# Patient Record
Sex: MALE | Race: WHITE | NOT HISPANIC OR LATINO | Employment: UNEMPLOYED | URBAN - METROPOLITAN AREA
[De-identification: names, ages, dates, MRNs, and addresses within clinical notes are randomized per-mention and may not be internally consistent; named-entity substitution may affect disease eponyms.]

---

## 2017-07-23 ENCOUNTER — APPOINTMENT (EMERGENCY)
Dept: CT IMAGING | Facility: HOSPITAL | Age: 52
End: 2017-07-23

## 2017-07-23 ENCOUNTER — HOSPITAL ENCOUNTER (EMERGENCY)
Facility: HOSPITAL | Age: 52
Discharge: HOME | End: 2017-07-23

## 2017-07-23 VITALS
HEART RATE: 50 BPM | WEIGHT: 180 LBS | DIASTOLIC BLOOD PRESSURE: 76 MMHG | SYSTOLIC BLOOD PRESSURE: 121 MMHG | TEMPERATURE: 98 F | OXYGEN SATURATION: 95 % | RESPIRATION RATE: 14 BRPM

## 2017-07-23 DIAGNOSIS — S16.1XXA NECK MUSCLE STRAIN, INITIAL ENCOUNTER: ICD-10-CM
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
## 2017-07-23 PROCEDURE — 96372 THER/PROPH/DIAG INJ SC/IM: CPT

## 2017-07-23 PROCEDURE — 70450 CT HEAD/BRAIN W/O DYE: CPT

## 2017-07-23 PROCEDURE — 72125 CT NECK SPINE W/O DYE: CPT

## 2017-07-23 PROCEDURE — 99284 EMERGENCY DEPT VISIT MOD MDM: CPT

## 2017-07-23 RX ORDER — METHOCARBAMOL 500 MG/1
500 TABLET, FILM COATED ORAL 4 TIMES DAILY
Qty: 20 TABLET | Refills: 0 | Status: SHIPPED | OUTPATIENT
Start: 2017-07-23 | End: 2017-09-10

## 2017-07-23 RX ORDER — KETOROLAC TROMETHAMINE 30 MG/ML
15 INJECTION, SOLUTION INTRAMUSCULAR; INTRAVENOUS ONCE
Status: COMPLETED | OUTPATIENT
Start: 2017-07-23 | End: 2017-07-23

## 2017-07-23 RX ORDER — NAPROXEN 500 MG/1
500 TABLET ORAL 2 TIMES DAILY WITH MEALS
Qty: 10 TABLET | Refills: 0 | Status: SHIPPED | OUTPATIENT
Start: 2017-07-23 | End: 2017-09-10

## 2017-07-23 RX ADMIN — KETOROLAC TROMETHAMINE 15 MG: 30 INJECTION, SOLUTION INTRAMUSCULAR at 13:14

## 2017-09-10 ENCOUNTER — HOSPITAL ENCOUNTER (EMERGENCY)
Facility: HOSPITAL | Age: 52
Discharge: HOME | End: 2017-09-10
Payer: MEDICARE

## 2017-09-10 ENCOUNTER — APPOINTMENT (EMERGENCY)
Dept: CT IMAGING | Facility: HOSPITAL | Age: 52
End: 2017-09-10
Payer: MEDICARE

## 2017-09-10 VITALS
TEMPERATURE: 99.7 F | OXYGEN SATURATION: 97 % | SYSTOLIC BLOOD PRESSURE: 119 MMHG | HEART RATE: 94 BPM | WEIGHT: 178.13 LBS | RESPIRATION RATE: 16 BRPM | DIASTOLIC BLOOD PRESSURE: 73 MMHG

## 2017-09-10 DIAGNOSIS — IMO0002: Primary | ICD-10-CM

## 2017-09-10 LAB
ALBUMIN SERPL BCP-MCNC: 3.5 G/DL (ref 3.5–5)
ALP SERPL-CCNC: 57 U/L (ref 46–116)
ALT SERPL W P-5'-P-CCNC: 25 U/L (ref 12–78)
ANION GAP SERPL CALCULATED.3IONS-SCNC: 6 MMOL/L (ref 4–13)
AST SERPL W P-5'-P-CCNC: 20 U/L (ref 5–45)
BASOPHILS # BLD AUTO: 0.02 THOUSANDS/ΜL (ref 0–0.1)
BASOPHILS NFR BLD AUTO: 0 % (ref 0–1)
BILIRUB SERPL-MCNC: 0.48 MG/DL (ref 0.2–1)
BUN SERPL-MCNC: 13 MG/DL (ref 5–25)
CALCIUM SERPL-MCNC: 9.3 MG/DL (ref 8.3–10.1)
CHLORIDE SERPL-SCNC: 101 MMOL/L (ref 100–108)
CO2 SERPL-SCNC: 32 MMOL/L (ref 21–32)
CREAT SERPL-MCNC: 0.87 MG/DL (ref 0.6–1.3)
EOSINOPHIL # BLD AUTO: 0.36 THOUSAND/ΜL (ref 0–0.61)
EOSINOPHIL NFR BLD AUTO: 2 % (ref 0–6)
ERYTHROCYTE [DISTWIDTH] IN BLOOD BY AUTOMATED COUNT: 13.1 % (ref 11.6–15.1)
GFR SERPL CREATININE-BSD FRML MDRD: 101 ML/MIN/1.73SQ M
GLUCOSE SERPL-MCNC: 123 MG/DL (ref 65–140)
HCT VFR BLD AUTO: 41.8 % (ref 36.5–49.3)
HGB BLD-MCNC: 15.4 G/DL (ref 12–17)
LACTATE SERPL-SCNC: 2 MMOL/L (ref 0.5–2)
LYMPHOCYTES # BLD AUTO: 1.44 THOUSANDS/ΜL (ref 0.6–4.47)
LYMPHOCYTES NFR BLD AUTO: 9 % (ref 14–44)
MCH RBC QN AUTO: 31.7 PG (ref 26.8–34.3)
MCHC RBC AUTO-ENTMCNC: 36.8 G/DL (ref 31.4–37.4)
MCV RBC AUTO: 86 FL (ref 82–98)
MONOCYTES # BLD AUTO: 1.33 THOUSAND/ΜL (ref 0.17–1.22)
MONOCYTES NFR BLD AUTO: 8 % (ref 4–12)
NEUTROPHILS # BLD AUTO: 13.46 THOUSANDS/ΜL (ref 1.85–7.62)
NEUTS SEG NFR BLD AUTO: 81 % (ref 43–75)
NRBC BLD AUTO-RTO: 0 /100 WBCS
PLATELET # BLD AUTO: 205 THOUSANDS/UL (ref 149–390)
PMV BLD AUTO: 10.6 FL (ref 8.9–12.7)
POTASSIUM SERPL-SCNC: 3.8 MMOL/L (ref 3.5–5.3)
PROT SERPL-MCNC: 7.5 G/DL (ref 6.4–8.2)
RBC # BLD AUTO: 4.86 MILLION/UL (ref 3.88–5.62)
SODIUM SERPL-SCNC: 139 MMOL/L (ref 136–145)
WBC # BLD AUTO: 16.61 THOUSAND/UL (ref 4.31–10.16)

## 2017-09-10 PROCEDURE — 80053 COMPREHEN METABOLIC PANEL: CPT | Performed by: PHYSICIAN ASSISTANT

## 2017-09-10 PROCEDURE — 99284 EMERGENCY DEPT VISIT MOD MDM: CPT

## 2017-09-10 PROCEDURE — 73201 CT UPPER EXTREMITY W/DYE: CPT

## 2017-09-10 PROCEDURE — 36415 COLL VENOUS BLD VENIPUNCTURE: CPT | Performed by: PHYSICIAN ASSISTANT

## 2017-09-10 PROCEDURE — 83605 ASSAY OF LACTIC ACID: CPT | Performed by: PHYSICIAN ASSISTANT

## 2017-09-10 PROCEDURE — 96361 HYDRATE IV INFUSION ADD-ON: CPT

## 2017-09-10 PROCEDURE — 87040 BLOOD CULTURE FOR BACTERIA: CPT | Performed by: PHYSICIAN ASSISTANT

## 2017-09-10 PROCEDURE — 96365 THER/PROPH/DIAG IV INF INIT: CPT

## 2017-09-10 PROCEDURE — 96367 TX/PROPH/DG ADDL SEQ IV INF: CPT

## 2017-09-10 PROCEDURE — 85025 COMPLETE CBC W/AUTO DIFF WBC: CPT | Performed by: PHYSICIAN ASSISTANT

## 2017-09-10 RX ORDER — CLINDAMYCIN HYDROCHLORIDE 150 MG/1
300 CAPSULE ORAL 4 TIMES DAILY
Qty: 80 CAPSULE | Refills: 0 | Status: SHIPPED | OUTPATIENT
Start: 2017-09-10 | End: 2017-09-20

## 2017-09-10 RX ORDER — NAPROXEN 500 MG/1
500 TABLET ORAL 2 TIMES DAILY WITH MEALS
Qty: 30 TABLET | Refills: 0 | Status: SHIPPED | OUTPATIENT
Start: 2017-09-10 | End: 2019-08-27 | Stop reason: ALTCHOICE

## 2017-09-10 RX ORDER — CLINDAMYCIN PHOSPHATE 600 MG/50ML
600 INJECTION INTRAVENOUS ONCE
Status: COMPLETED | OUTPATIENT
Start: 2017-09-10 | End: 2017-09-10

## 2017-09-10 RX ADMIN — CEFTRIAXONE 1000 MG: 1 INJECTION, POWDER, FOR SOLUTION INTRAMUSCULAR; INTRAVENOUS at 08:52

## 2017-09-10 RX ADMIN — SODIUM CHLORIDE 1000 ML: 0.9 INJECTION, SOLUTION INTRAVENOUS at 08:08

## 2017-09-10 RX ADMIN — IOHEXOL 100 ML: 350 INJECTION, SOLUTION INTRAVENOUS at 08:53

## 2017-09-10 RX ADMIN — CLINDAMYCIN PHOSPHATE 600 MG: 12 INJECTION, SOLUTION INTRAMUSCULAR; INTRAVENOUS at 08:08

## 2017-09-15 LAB
BACTERIA BLD CULT: NORMAL
BACTERIA BLD CULT: NORMAL

## 2018-09-16 ENCOUNTER — HOSPITAL ENCOUNTER (EMERGENCY)
Facility: HOSPITAL | Age: 53
Discharge: HOME | End: 2018-09-16
Attending: EMERGENCY MEDICINE | Admitting: EMERGENCY MEDICINE
Payer: COMMERCIAL

## 2018-09-16 VITALS
DIASTOLIC BLOOD PRESSURE: 83 MMHG | HEART RATE: 85 BPM | WEIGHT: 165 LBS | OXYGEN SATURATION: 95 % | TEMPERATURE: 98 F | RESPIRATION RATE: 17 BRPM | SYSTOLIC BLOOD PRESSURE: 131 MMHG

## 2018-09-16 DIAGNOSIS — L03.115 CELLULITIS AND ABSCESS OF RIGHT LEG: ICD-10-CM

## 2018-09-16 DIAGNOSIS — L02.415 CELLULITIS AND ABSCESS OF RIGHT LEG: ICD-10-CM
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
## 2018-09-16 PROCEDURE — 99281 EMR DPT VST MAYX REQ PHY/QHP: CPT

## 2018-09-16 RX ORDER — DOXYCYCLINE HYCLATE 100 MG/1
100 CAPSULE ORAL 2 TIMES DAILY
Qty: 20 CAPSULE | Refills: 0 | Status: SHIPPED | OUTPATIENT
Start: 2018-09-16 | End: 2018-09-23

## 2018-09-16 NOTE — DISCHARGE INSTRUCTIONS
Abscess   WHAT YOU NEED TO KNOW:   A warm compress may help your abscess drain  Your healthcare provider may make a cut in the abscess so it can drain  You may need surgery to remove an abscess that is on your hands or buttocks  DISCHARGE INSTRUCTIONS:   Return to the emergency department if:   · The area around your abscess becomes very painful, warm, or has red streaks  · You have a fever and chills  · Your heart is beating faster than usual      · You feel faint or confused  Contact your healthcare provider if:   · Your abscess gets bigger or does not get better  · Your abscess returns  · You have questions or concerns about your condition or care  Medicines: You may  need any of the following:  · Antibiotics  help treat a bacterial infection  · Acetaminophen  decreases pain and fever  It is available without a doctor's order  Ask how much to take and how often to take it  Follow directions  Acetaminophen can cause liver damage if not taken correctly  · NSAIDs , such as ibuprofen, help decrease swelling, pain, and fever  This medicine is available with or without a doctor's order  NSAIDs can cause stomach bleeding or kidney problems in certain people  If you take blood thinner medicine, always ask your healthcare provider if NSAIDs are safe for you  Always read the medicine label and follow directions  · Take your medicine as directed  Contact your healthcare provider if you think your medicine is not helping or if you have side effects  Tell him or her if you are allergic to any medicine  Keep a list of the medicines, vitamins, and herbs you take  Include the amounts, and when and why you take them  Bring the list or the pill bottles to follow-up visits  Carry your medicine list with you in case of an emergency  Self-care:   · Apply a warm compress to your abscess  This will help it open and drain  Wet a washcloth in warm, but not hot, water  Apply the compress for 10 minutes  Repeat this 4 times each day  Do not  press on an abscess or try to open it with a needle  You may push the bacteria deeper or into your blood  · Do not share your clothes, towels, or sheets with anyone  This can spread the infection to others  · Wash your hands often  This can help prevent the spread of germs  Use soap and water or an alcohol-based hand rub  Care for your wound after it is drained:   · Care for your wound as directed  If your healthcare provider says it is okay, carefully remove the bandage and gauze packing  You may need to soak the gauze to get it out of your wound  Clean your wound and the area around it as directed  Dry the area and put on new, clean bandages  Change your bandages when they get wet or dirty  · Ask your healthcare provider how to change the gauze in your wound  Keep track of how many pieces of gauze are placed inside the wound  Do not put too much packing in the wound  Do not pack the gauze too tightly in your wound  Follow up with your healthcare provider in 1 to 3 days: You may need to have your packing removed or your bandage changed  Write down your questions so you remember to ask them during your visits  © 2017 [address]  Information is for End User's use only and may not be sold, redistributed or otherwise used for commercial purposes  All illustrations and images included in CareNotes® are the copyrighted property of [employer]  or Jean-Claude Morrow  The above information is an  only  It is not intended as medical advice for individual conditions or treatments  Talk to your doctor, nurse or pharmacist before following any medical regimen to see if it is safe and effective for you  Cellulitis, Ambulatory Care   GENERAL INFORMATION:   Cellulitis  is a skin infection caused by bacteria         Common symptoms include the following:   · Fever    · A red, warm, swollen area on your skin    · Pain when the area is touched    · Bumps or blisters (abscess) that may drain pus    · Bumpy, raised skin that feels like an orange peel  Seek immediate care for the following symptoms:   · An increase in pain, redness, warmth, and size    · Red streaks coming from the infected area    · A thin, gray-brown discharge coming from your infected skin area    · A crackling under your skin when you touch it    · Purple dots or bumps on your skin, or bleeding under your skin    · New swelling and pain in your legs    · Sudden trouble breathing or chest pain  Treatment for cellulitis  may include medicines to treat the bacterial infection or decrease pain  The infection may need to be cleaned out  Damaged, dead, or infected tissue may need to be cut away to help your wound heal   Manage your symptoms:   · Elevate your wound above the level of your heart  as often as you can  This will help decrease swelling and pain  Prop your wound on pillows or blankets to keep it elevated comfortably  · Clean your wound as directed  You may need to wash the wound with soap and water  Look for signs of infection  · Wear pressure stockings as directed  The stockings are tight and put pressure on your legs  This improves blood flow and decreases swelling  Prevent cellulitis:   · Wash your hands often  Use soap and water  Wash your hands after you use the bathroom, change a child's diapers, or sneeze  Wash your hands before you prepare or eat food  Use lotion to prevent dry, cracked skin  · Do not share personal items, such as towels, clothing, and razors  · Clean exercise equipment  with germ-killing  before and after you use it  Follow up with your healthcare provider as directed:  Write down your questions so you remember to ask them during your visits  CARE AGREEMENT:   You have the right to help plan your care  Learn about your health condition and how it may be treated   Discuss treatment options with your caregivers to decide what care you want to receive  You always have the right to refuse treatment  The above information is an  only  It is not intended as medical advice for individual conditions or treatments  Talk to your doctor, nurse or pharmacist before following any medical regimen to see if it is safe and effective for you  © 2014 [address] is for End User's use only and may not be sold, redistributed or otherwise used for commercial purposes  All illustrations and images included in CareNotes® are the copyrighted property of A [employer] M [employer]  or Jean-Claude Morrow

## 2018-09-16 NOTE — ED PROVIDER NOTES
History  Chief Complaint   Patient presents with    Insect Bite     patient reports multiple "mosquito bites on lower legs that I scratched open"  Abscess   Location:  Leg  Leg abscess location:  R upper leg, L upper leg, R lower leg and L lower leg  Size:  Multiple open draining abscesses all approximately a the size of a nickel in diameter or less  Abscess quality: draining, painful, redness and warmth    Red streaking: no    Progression:  Unchanged  Pain details:     Quality:  Dull and aching    Severity:  Mild    Timing:  Constant    Progression:  Worsening  Chronicity:  New  Context: insect bite/sting    Relieved by:  Nothing  Worsened by:  Nothing  Ineffective treatments:  None tried  Associated symptoms: no fever    Risk factors: prior abscess        Prior to Admission Medications   Prescriptions Last Dose Informant Patient Reported? Taking?   naproxen (EC NAPROSYN) 500 MG EC tablet   No No   Sig: Take 1 tablet by mouth 2 (two) times a day with meals for 15 days      Facility-Administered Medications: None       Past Medical History:   Diagnosis Date    Asthma     Bronchitis     Hepatitis C     Pneumonia        Past Surgical History:   Procedure Laterality Date    MOUTH SURGERY         History reviewed  No pertinent family history  I have reviewed and agree with the history as documented  Social History   Substance Use Topics    Smoking status: Current Every Day Smoker     Packs/day: 1 00     Types: Cigarettes    Smokeless tobacco: Never Used    Alcohol use No        Review of Systems   Constitutional: Negative for fever  Eyes: Negative for pain  Respiratory: Negative for chest tightness  Cardiovascular: Negative for leg swelling  Gastrointestinal: Negative for constipation  Endocrine: Negative for polydipsia  Genitourinary: Negative for flank pain  Musculoskeletal: Negative for back pain  Skin: Negative for rash  Allergic/Immunologic: Negative for food allergies  Neurological: Negative for facial asymmetry  Hematological: Does not bruise/bleed easily  Psychiatric/Behavioral: Negative for confusion  Physical Exam  Physical Exam   Constitutional: He appears well-developed and well-nourished  HENT:   Head: Normocephalic  Eyes: Conjunctivae are normal    Neck: Neck supple  No JVD present  Cardiovascular: Normal rate  Pulmonary/Chest: Effort normal    Abdominal: He exhibits no distension  Musculoskeletal: He exhibits no edema or deformity  Neurological: He is alert  Skin: Skin is warm  Multiple areas of excoriations throughout the lower extremities  All are open and weeping to are noted in the right upper thigh which are red and expressing pus but are open and weeping  Do not feel any of the need further management with I and D  Psychiatric: He has a normal mood and affect  Vital Signs  ED Triage Vitals [09/16/18 0348]   Temperature Pulse Respirations Blood Pressure SpO2   98 °F (36 7 °C) 85 17 131/83 95 %      Temp Source Heart Rate Source Patient Position - Orthostatic VS BP Location FiO2 (%)   Oral -- Sitting Right arm --      Pain Score       --           Vitals:    09/16/18 0348   BP: 131/83   Pulse: 85   Patient Position - Orthostatic VS: Sitting       Visual Acuity      ED Medications  Medications - No data to display    Diagnostic Studies  Results Reviewed     None                 No orders to display              Procedures  Procedures       Phone Contacts  ED Phone Contact    ED Course                               MDM  Number of Diagnoses or Management Options  Bug bites:   Cellulitis and abscess of right leg:   Diagnosis management comments: Right and left legs  Patient shows me open draining abscesses on the right upper thigh and knee  No evidence of septic joint  Patient admits to narcotic use within the last 12 hours  This time will cover with doxycycline  Educated patient on need to take antibiotics and finish the course  Educated patient persistent or worsening signs symptoms and to return to the emergency department since patient has no PCP  Patient is nontoxic on exam   Patient admits understanding and agreement  Patient was discharged in stable ambulatory condition  CritCare Time    Disposition  Final diagnoses:   Bug bites   Cellulitis and abscess of right leg - Draining abscess     Time reflects when diagnosis was documented in both MDM as applicable and the Disposition within this note     Time User Action Codes Description Comment    9/16/2018  4:13 AM Landry Coughlin Donovan Flavors  XXXA] Bug bites     9/16/2018  4:14 AM Landry Coughlin [[id number],  [id number]] Cellulitis and abscess of right leg     9/16/2018  4:14 AM LydiaTrinidadevelinmakayla Mendel [[id number],  [id number]] Cellulitis and abscess of right leg Draining abscess      ED Disposition     ED Disposition Condition Comment    Discharge  Jaymes Runner discharge to home/self care  Condition at discharge: Stable        Follow-up Information    None         Discharge Medication List as of 9/16/2018  4:16 AM      START taking these medications    Details   doxycycline hyclate (VIBRAMYCIN) 100 mg capsule Take 1 capsule (100 mg total) by mouth 2 (two) times a day for 7 days, Starting Sun 9/16/2018, Until Sun 9/23/2018, Print         CONTINUE these medications which have NOT CHANGED    Details   naproxen (EC NAPROSYN) 500 MG EC tablet Take 1 tablet by mouth 2 (two) times a day with meals for 15 days, Starting Sun 9/10/2017, Until Mon 9/25/2017, Print           No discharge procedures on file      ED Provider  Electronically Signed by           Jane Draper PA-C  09/17/18 8556

## 2018-10-03 ENCOUNTER — HOSPITAL ENCOUNTER (EMERGENCY)
Facility: HOSPITAL | Age: 53
Discharge: HOME | End: 2018-10-03
Attending: EMERGENCY MEDICINE
Payer: COMMERCIAL

## 2018-10-03 VITALS
SYSTOLIC BLOOD PRESSURE: 116 MMHG | DIASTOLIC BLOOD PRESSURE: 70 MMHG | HEART RATE: 59 BPM | RESPIRATION RATE: 18 BRPM | OXYGEN SATURATION: 98 % | TEMPERATURE: 96.9 F

## 2018-10-03 DIAGNOSIS — F32.A DEPRESSION: Primary | ICD-10-CM

## 2018-10-03 LAB
AMPHETAMINES SERPL QL SCN: NEGATIVE
ANION GAP SERPL CALCULATED.3IONS-SCNC: 7 MMOL/L (ref 5–14)
BACTERIA UR QL AUTO: ABNORMAL /HPF
BARBITURATES UR QL: NEGATIVE
BASOPHILS # BLD AUTO: 0.1 THOUSANDS/ΜL (ref 0–0.1)
BASOPHILS NFR BLD AUTO: 1 % (ref 0–1)
BENZODIAZ UR QL: NEGATIVE
BILIRUB UR QL STRIP: NEGATIVE
BUN SERPL-MCNC: 22 MG/DL (ref 5–25)
CALCIUM SERPL-MCNC: 9.4 MG/DL (ref 8.4–10.2)
CAOX CRY URNS QL MICRO: ABNORMAL /HPF
CHLORIDE SERPL-SCNC: 101 MMOL/L (ref 97–108)
CLARITY UR: ABNORMAL
CO2 SERPL-SCNC: 32 MMOL/L (ref 22–30)
COCAINE UR QL: NEGATIVE
COLOR UR: YELLOW
CREAT SERPL-MCNC: 0.66 MG/DL (ref 0.7–1.5)
EOSINOPHIL # BLD AUTO: 0.3 THOUSAND/ΜL (ref 0–0.4)
EOSINOPHIL NFR BLD AUTO: 4 % (ref 0–6)
ERYTHROCYTE [DISTWIDTH] IN BLOOD BY AUTOMATED COUNT: 13.6 %
ETHANOL SERPL-MCNC: <10 MG/DL (ref 0–10)
GFR SERPL CREATININE-BSD FRML MDRD: 111 ML/MIN/1.73SQ M
GLUCOSE SERPL-MCNC: 95 MG/DL (ref 70–99)
GLUCOSE UR STRIP-MCNC: NEGATIVE MG/DL
HCT VFR BLD AUTO: 45.8 % (ref 41–53)
HGB BLD-MCNC: 15.5 G/DL (ref 13.5–17.5)
HGB UR QL STRIP.AUTO: NEGATIVE
KETONES UR STRIP-MCNC: NEGATIVE MG/DL
LEUKOCYTE ESTERASE UR QL STRIP: 25
LYMPHOCYTES # BLD AUTO: 2.2 THOUSANDS/ΜL (ref 0.5–4)
LYMPHOCYTES NFR BLD AUTO: 27 % (ref 20–50)
MCH RBC QN AUTO: 30.4 PG (ref 26–34)
MCHC RBC AUTO-ENTMCNC: 33.7 G/DL (ref 31–36)
MCV RBC AUTO: 90 FL (ref 80–100)
METHADONE UR QL: NEGATIVE
MONOCYTES # BLD AUTO: 0.8 THOUSAND/ΜL (ref 0.2–0.9)
MONOCYTES NFR BLD AUTO: 9 % (ref 1–10)
NEUTROPHILS # BLD AUTO: 5 THOUSANDS/ΜL (ref 1.8–7.8)
NEUTS SEG NFR BLD AUTO: 60 % (ref 45–65)
NITRITE UR QL STRIP: NEGATIVE
NON-SQ EPI CELLS URNS QL MICRO: ABNORMAL /HPF
OPIATES UR QL SCN: POSITIVE
PCP UR QL: NEGATIVE
PH UR STRIP.AUTO: 6 [PH] (ref 4.5–8)
PLATELET # BLD AUTO: 196 THOUSANDS/UL (ref 150–450)
PMV BLD AUTO: 9.3 FL (ref 8.9–12.7)
POTASSIUM SERPL-SCNC: 4.4 MMOL/L (ref 3.6–5)
PROT UR STRIP-MCNC: NEGATIVE MG/DL
RBC # BLD AUTO: 5.08 MILLION/UL (ref 4.5–5.9)
RBC #/AREA URNS AUTO: ABNORMAL /HPF
SODIUM SERPL-SCNC: 140 MMOL/L (ref 137–147)
SP GR UR STRIP.AUTO: 1.02 (ref 1–1.04)
THC UR QL: POSITIVE
UROBILINOGEN UA: NEGATIVE MG/DL
WBC # BLD AUTO: 8.4 THOUSAND/UL (ref 4.5–11)
WBC #/AREA URNS AUTO: ABNORMAL /HPF

## 2018-10-03 PROCEDURE — 85025 COMPLETE CBC W/AUTO DIFF WBC: CPT | Performed by: EMERGENCY MEDICINE

## 2018-10-03 PROCEDURE — 80048 BASIC METABOLIC PNL TOTAL CA: CPT | Performed by: EMERGENCY MEDICINE

## 2018-10-03 PROCEDURE — 80307 DRUG TEST PRSMV CHEM ANLYZR: CPT | Performed by: EMERGENCY MEDICINE

## 2018-10-03 PROCEDURE — 99284 EMERGENCY DEPT VISIT MOD MDM: CPT

## 2018-10-03 PROCEDURE — 36415 COLL VENOUS BLD VENIPUNCTURE: CPT | Performed by: EMERGENCY MEDICINE

## 2018-10-03 PROCEDURE — 81001 URINALYSIS AUTO W/SCOPE: CPT | Performed by: EMERGENCY MEDICINE

## 2018-10-03 PROCEDURE — 80320 DRUG SCREEN QUANTALCOHOLS: CPT | Performed by: EMERGENCY MEDICINE

## 2018-10-03 PROCEDURE — 81003 URINALYSIS AUTO W/O SCOPE: CPT | Performed by: EMERGENCY MEDICINE

## 2018-10-03 RX ORDER — LORAZEPAM 0.5 MG/1
TABLET ORAL
Status: COMPLETED
Start: 2018-10-03 | End: 2018-10-03

## 2018-10-03 RX ORDER — CLONIDINE HYDROCHLORIDE 0.1 MG/1
TABLET ORAL
Status: COMPLETED
Start: 2018-10-03 | End: 2018-10-03

## 2018-10-03 RX ORDER — LORAZEPAM 0.5 MG/1
1 TABLET ORAL ONCE
Status: COMPLETED | OUTPATIENT
Start: 2018-10-03 | End: 2018-10-03

## 2018-10-03 RX ORDER — CLONIDINE HYDROCHLORIDE 0.1 MG/1
0.1 TABLET ORAL ONCE
Status: COMPLETED | OUTPATIENT
Start: 2018-10-03 | End: 2018-10-03

## 2018-10-03 RX ORDER — ONDANSETRON 2 MG/ML
4 INJECTION INTRAMUSCULAR; INTRAVENOUS ONCE
Status: DISCONTINUED | OUTPATIENT
Start: 2018-10-03 | End: 2018-10-03

## 2018-10-03 RX ADMIN — LORAZEPAM 1 MG: 0.5 TABLET ORAL at 09:22

## 2018-10-03 RX ADMIN — CLONIDINE HYDROCHLORIDE 0.1 MG: 0.1 TABLET ORAL at 09:22

## 2018-10-03 NOTE — ED NOTES
The patient is willing to be evaluated by HOST, [phone]  Alex Melton stated she will be out shortly to assess the patient

## 2018-10-03 NOTE — ED NOTES
Pt given blankets, pillow, some food and drinks   Made comfortable     [facility]  10/03/18 8647

## 2018-10-03 NOTE — ED NOTES
Ro from [facility]OST located beds at [facility] and [facility]  Referrals faxed  Awaiting decision

## 2018-10-03 NOTE — ED NOTES
The patient was accepted at [facility]  He was picked up by their

## 2018-10-03 NOTE — DISCHARGE INSTRUCTIONS
Depression, Ambulatory Care   GENERAL INFORMATION:   Depression  is a medical condition that causes feelings of sadness or hopelessness that do not go away  Depression may cause you to lose interest in things you used to enjoy  These feelings may interfere with your daily life  Common symptoms include the following:   · Appetite changes, or weight gain or loss    · Trouble going to sleep or staying asleep, or sleeping too much    · Fatigue or lack of energy    · Feeling restless, irritable, or withdrawn    · Feeling worthless, hopeless, discouraged, or very guilty    · Trouble concentrating, remembering things, doing daily tasks, or making decisions    · Thoughts about hurting or killing yourself  Seek immediate care for the following symptoms:   · You think about harming yourself or someone else  Treatment for depression  may include medicine to improve or balance your mood  Therapy may also be used to treat your depression  A therapist will help you learn to cope with your thoughts and feelings  Therapy can be done alone or in a group  It may also be done with family members or a significant other  Manage depression:   · Get regular physical activity  Try to exercise for 30 minutes, 3 to 5 days a week  Work with your healthcare provider to develop an exercise plan that you enjoy  · Get enough sleep  Create a routine to help you relax before bed  Try to go to bed and wake up at the same time every day  Sleep is important for emotional health  · Eat a variety of healthy foods  Healthy foods include fruits, vegetables, whole-grain breads, low-fat dairy products, beans, lean meats, and fish  A healthy meal plan is low in fat, salt, and added sugar  · Avoid or limit alcohol  Ask your healthcare provider how much alcohol is safe for you to drink  A drink of alcohol is 12 ounces of beer, 5 ounces of wine, or 1½ ounces of liquor  Follow up with your healthcare provider as directed:   You will need to return so your healthcare provider can monitor your progress  Write down your questions so you remember to ask them during your visits  CARE AGREEMENT:   You have the right to help plan your care  Learn about your health condition and how it may be treated  Discuss treatment options with your caregivers to decide what care you want to receive  You always have the right to refuse treatment  The above information is an  only  It is not intended as medical advice for individual conditions or treatments  Talk to your doctor, nurse or pharmacist before following any medical regimen to see if it is safe and effective for you  © 2014 [address] is for End User's use only and may not be sold, redistributed or otherwise used for commercial purposes  All illustrations and images included in CareNotes® are the copyrighted property of [employer] M [employer]  or Jean-Claude Morrow

## 2018-10-03 NOTE — ED PROVIDER NOTES
History  Chief Complaint   Patient presents with    Depression     pt states that he has been feeling depressed for a month now  pt deneis see a psych doctor  pt states that he was speaking with someone at the [facility] clinic  pt that the last time he spoke with someone was in Aug  pt states that he last took meds over 20 years ago for his depression  pt denies SI but states that he want to go to sleep and not wake up  pt states that he is tired of living like this  pt states that he is tired of struggling   Psychiatric Evaluation     pt states that he last used tuesday afternoon heroin 3 bags IV  pt denies any drinking  pt states that he is homeless and has been homeless since [address]  This is a 59-year-old male past medical history of heroin abuse on Suboxone up until the beginning of the month presents for evaluation of substance abuse  Patient states that he is currently homeless and split up with his girlfriend approximately 1 month ago  Since then he has been under lot of stress and ran out of his Suboxone prescription  This was prescribed by [address] but he missed his last appointment and was unable to get a further prescription  He has been injecting 4-10 bags of heroin daily since then  Denies any other drug use  Patient states that tonight he felt hopeless and came here to get help with his drug problem  No active suicidal homicidal ideation  He comes in seeking treatment  No other current complaints            Prior to Admission Medications   Prescriptions Last Dose Informant Patient Reported? Taking?   naproxen (EC NAPROSYN) 500 MG EC tablet   No No   Sig: Take 1 tablet by mouth 2 (two) times a day with meals for 15 days      Facility-Administered Medications: None       Past Medical History:   Diagnosis Date    Asthma     Bronchitis     Hepatitis C     Pneumonia        Past Surgical History:   Procedure Laterality Date    MOUTH SURGERY         History reviewed   No pertinent family history  I have reviewed and agree with the history as documented  Social History   Substance Use Topics    Smoking status: Current Every Day Smoker     Packs/day: 1 00     Types: Cigarettes    Smokeless tobacco: Never Used    Alcohol use No        Review of Systems   Constitutional: Negative for appetite change, chills and fever  HENT: Negative for rhinorrhea and sore throat  Eyes: Negative for photophobia and visual disturbance  Respiratory: Negative for cough and shortness of breath  Cardiovascular: Negative for chest pain and palpitations  Gastrointestinal: Negative for abdominal pain and diarrhea  Genitourinary: Negative for dysuria, frequency and urgency  Skin: Negative for rash  Neurological: Negative for dizziness and weakness  Psychiatric/Behavioral: Positive for dysphoric mood and suicidal ideas  All other systems reviewed and are negative  Physical Exam  Physical Exam   Constitutional: He is oriented to person, place, and time  He appears well-developed and well-nourished  HENT:   Head: Normocephalic and atraumatic  Right Ear: External ear normal    Left Ear: External ear normal    Mouth/Throat: Oropharynx is clear and moist    Eyes: Pupils are equal, round, and reactive to light  Conjunctivae and EOM are normal    Neck: Normal range of motion  Neck supple  No JVD present  No tracheal deviation present  Cardiovascular: Normal rate, regular rhythm and normal heart sounds  Exam reveals no gallop and no friction rub  No murmur heard  Pulmonary/Chest: Effort normal  No stridor  No respiratory distress  He has no wheezes  He has no rales  Abdominal: Soft  He exhibits no distension and no mass  There is no tenderness  There is no rebound and no guarding  Musculoskeletal: Normal range of motion  He exhibits no edema  Neurological: He is alert and oriented to person, place, and time  No cranial nerve deficit  Skin: Skin is warm and dry   No rash noted  No erythema  No pallor  Psychiatric: He has a normal mood and affect  Nursing note and vitals reviewed  Vital Signs  ED Triage Vitals [10/03/18 0205]   Temperature Pulse Respirations Blood Pressure SpO2   (!) 96 9 °F (36 1 °C) 59 18 116/70 98 %      Temp Source Heart Rate Source Patient Position - Orthostatic VS BP Location FiO2 (%)   Tympanic Monitor -- Left arm --      Pain Score       --           Vitals:    10/03/18 0205   BP: 116/70   Pulse: 59       Visual Acuity      ED Medications  Medications   cloNIDine (CATAPRES) tablet 0 1 mg (0 1 mg Oral Given 10/3/18 7863)   LORazepam (ATIVAN) tablet 1 mg (1 mg Oral Given 10/3/18 0908)       Diagnostic Studies  Results Reviewed     Procedure Component Value Units Date/Time    Urine Microscopic [[id number]]  (Abnormal) Collected:  10/03/18 0254    Lab Status:  Final result Specimen:  Urine from Urine, Clean Catch Updated:  10/03/18 0341     RBC, UA None Seen /hpf      WBC, UA 2-4 (A) /hpf      Epithelial Cells Occasional /hpf      Bacteria, UA Occasional /hpf      Ca Oxalate Gina, UA Occasional (A) /hpf     Rapid drug screen, urine [[id number]]  (Abnormal) Collected:  10/03/18 0253    Lab Status:  Final result Specimen:  Urine from Urine, Clean Catch Updated:  10/03/18 0334     Amph/Meth UR Negative     Barbiturate Ur Negative     Benzodiazepine Urine Negative     Cocaine Urine Negative     Methadone Urine Negative     Opiate Urine Positive (A)     PCP Ur Negative     THC Urine Positive (A)    Narrative:         Presumptive report  If requested, specimen will be sent to reference lab for confirmation  FOR MEDICAL PURPOSES ONLY  IF CONFIRMATION NEEDED PLEASE CONTACT THE LAB WITHIN 5 DAYS      Drug Screen Cutoff Levels:  AMPHETAMINE/METHAMPHETAMINES  1000 ng/mL  BARBITURATES     200 ng/mL  BENZODIAZEPINES     200 ng/mL  COCAINE      300 ng/mL  METHADONE      300 ng/mL  OPIATES      300 ng/mL  PHENCYCLIDINE     25 ng/mL  THC       50 ng/mL    Ethanol [[id number]]  (Normal) Collected:  10/03/18 0253    Lab Status:  Final result Specimen:  Blood from Arm, Left Updated:  10/03/18 0328     Ethanol Lvl <10 mg/dL     Basic metabolic panel [[id number]]  (Abnormal) Collected:  10/03/18 0253    Lab Status:  Final result Specimen:  Blood from Arm, Left Updated:  10/03/18 0972     Sodium 140 mmol/L      Potassium 4 4 mmol/L      Chloride 101 mmol/L      CO2 32 (H) mmol/L      ANION GAP 7 mmol/L      BUN 22 mg/dL      Creatinine 0 66 (L) mg/dL      Glucose 95 mg/dL      Calcium 9 4 mg/dL      eGFR 111 ml/min/1 73sq m     Narrative:         National Kidney Disease Education Program recommendations are as follows:  GFR calculation is accurate only with a steady state creatinine  Chronic Kidney disease less than 60 ml/min/1 73 sq  meters  Kidney failure less than 15 ml/min/1 73 sq  meters      UA w Reflex to Microscopic w Reflex to Culture [[id number]]  (Abnormal) Collected:  10/03/18 0254    Lab Status:  Final result Specimen:  Urine from Urine, Clean Catch Updated:  10/03/18 0319     Color, UA Yellow     Clarity, UA Cloudy (A)     Specific Gravity, UA 1 025     pH, UA 6 0     Leukocytes, UA 25 0 (A)     Nitrite, UA Negative     Protein, UA Negative mg/dl      Glucose, UA Negative mg/dl      Ketones, UA Negative mg/dl      Bilirubin, UA Negative     Blood, UA Negative     UROBILINOGEN UA Negative mg/dL     CBC and differential [[id number]]  (Normal) Collected:  10/03/18 0253    Lab Status:  Final result Specimen:  Blood from Arm, Left Updated:  10/03/18 0318     WBC 8 40 Thousand/uL      RBC 5 08 Million/uL      Hemoglobin 15 5 g/dL      Hematocrit 45 8 %      MCV 90 fL      MCH 30 4 pg      MCHC 33 7 g/dL      RDW 13 6 %      MPV 9 3 fL      Platelets 975 Thousands/uL      Neutrophils Relative 60 %      Lymphocytes Relative 27 %      Monocytes Relative 9 %      Eosinophils Relative 4 %      Basophils Relative 1 %      Neutrophils Absolute 5 00 Thousands/µL      Lymphocytes Absolute 2 20 Thousands/µL      Monocytes Absolute 0 80 Thousand/µL      Eosinophils Absolute 0 30 Thousand/µL      Basophils Absolute 0 10 Thousands/µL                  No orders to display              Procedures  Procedures       Phone Contacts  ED Phone Contact    ED Course  ED Course as of Oct 03 2217   Wed Oct 03, 2018   [id number] Care signed out to a m  physician, Dr Nya aRmirez                                [facility]  Number of Diagnoses or Management Options  Diagnosis management comments: 52-year-old male presents for help with his heroin abuse  No SI or HI  Will obtain medical clearance lab work and have crisis evaluate patient in the morning    CritCare Time    Disposition  Final diagnoses:   Depression     Time reflects when diagnosis was documented in both MDM as applicable and the Disposition within this note     Time User Action Codes Description Comment    10/3/2018 12:11 PM Altaf Coughlin [F32 9] Depression       ED Disposition     ED Disposition Condition Comment    Discharge  Derik Zavala discharge to home/self care  Condition at discharge: Good        Follow-up Information    None         Discharge Medication List as of 10/3/2018 12:12 PM      CONTINUE these medications which have NOT CHANGED    Details   naproxen (EC NAPROSYN) 500 MG EC tablet Take 1 tablet by mouth 2 (two) times a day with meals for 15 days, Starting Sun 9/10/2017, Until Mon 9/25/2017, Print           No discharge procedures on file      ED Provider  Electronically Signed by           Cortez Hill MD  10/03/18 2210

## 2019-01-04 ENCOUNTER — HOSPITAL ENCOUNTER (EMERGENCY)
Facility: HOSPITAL | Age: 54
Discharge: HOME | End: 2019-01-04
Attending: EMERGENCY MEDICINE | Admitting: EMERGENCY MEDICINE
Payer: COMMERCIAL

## 2019-01-04 VITALS
BODY MASS INDEX: 23.16 KG/M2 | TEMPERATURE: 97.5 F | SYSTOLIC BLOOD PRESSURE: 132 MMHG | HEIGHT: 72 IN | DIASTOLIC BLOOD PRESSURE: 63 MMHG | WEIGHT: 171 LBS | RESPIRATION RATE: 16 BRPM | OXYGEN SATURATION: 97 % | HEART RATE: 73 BPM

## 2019-01-04 DIAGNOSIS — K04.7 DENTAL ABSCESS: Primary | ICD-10-CM

## 2019-01-04 PROCEDURE — 99282 EMERGENCY DEPT VISIT SF MDM: CPT

## 2019-01-04 RX ORDER — IBUPROFEN 400 MG/1
400 TABLET ORAL EVERY 6 HOURS PRN
Qty: 12 TABLET | Refills: 0 | Status: SHIPPED | OUTPATIENT
Start: 2019-01-04

## 2019-01-04 RX ORDER — AMOXICILLIN AND CLAVULANATE POTASSIUM 875; 125 MG/1; MG/1
1 TABLET, FILM COATED ORAL 2 TIMES DAILY
Qty: 14 TABLET | Refills: 0 | Status: SHIPPED | OUTPATIENT
Start: 2019-01-04 | End: 2019-01-14

## 2019-01-04 NOTE — ED PROVIDER NOTES
History  Chief Complaint   Patient presents with    Oral Pain     I have pain to my right lower gum line  I have had it for months  It has been and on problem  I have noted pus coming out of the area  Patient is a 50-year-old male with multiple comorbid conditions who presents today with a chief complaint of right-sided lower jaw pain  Patient reports his gum line has been swollen and painful for months however recently he was able to express pus out of the area  Patient denies any difficulty managing oral secretions, difficulty swallowing, radiation of the pain, numbness, tingling  Patient denies fevers or chills, facial swelling, abdominal pain nausea vomiting  Patient reports he is able to eat and drink normally and has not taken anything for the pain  Patient reports he is not allergic to any antibiotics and has a dentist appointment coming up in mid January  Dental Pain   Location:  Lower  Lower teeth location: right lower gumline  Quality:  Aching  Severity:  Mild  Onset quality:  Gradual  Timing:  Constant  Progression:  Unchanged  Chronicity:  New  Context: abscess    Relieved by: expressing pus  Worsened by:  Nothing  Ineffective treatments:  None tried  Associated symptoms: no congestion and no fever        Prior to Admission Medications   Prescriptions Last Dose Informant Patient Reported? Taking?   naproxen (EC NAPROSYN) 500 MG EC tablet   No No   Sig: Take 1 tablet by mouth 2 (two) times a day with meals for 15 days      Facility-Administered Medications: None       Past Medical History:   Diagnosis Date    Asthma     Bronchitis     Hepatitis C     Pneumonia        Past Surgical History:   Procedure Laterality Date    MOUTH SURGERY         History reviewed  No pertinent family history  I have reviewed and agree with the history as documented      Social History   Substance Use Topics    Smoking status: Current Every Day Smoker     Packs/day: 1 00     Types: Cigarettes    Smokeless tobacco: Never Used    Alcohol use No        Review of Systems   Constitutional: Negative for chills, fatigue and fever  HENT: Positive for dental problem  Negative for congestion, ear pain, rhinorrhea and sore throat  Eyes: Negative for redness  Respiratory: Negative for chest tightness and shortness of breath  Cardiovascular: Negative for chest pain and palpitations  Gastrointestinal: Negative for abdominal pain, nausea and vomiting  Genitourinary: Negative for dysuria and hematuria  Musculoskeletal: Negative  Skin: Negative for rash  Neurological: Negative for dizziness, syncope, light-headedness and numbness  Physical Exam  Physical Exam   Constitutional: He is oriented to person, place, and time  He appears well-developed and well-nourished  HENT:   Head: Normocephalic and atraumatic  Mouth/Throat: Dental caries present  Multiple dental carries noted and missing teeth   Eyes: Pupils are equal, round, and reactive to light  Neck: Normal range of motion  Cardiovascular: Normal rate, regular rhythm and normal heart sounds  Pulmonary/Chest: Effort normal and breath sounds normal    Abdominal: Soft  There is no tenderness  There is no guarding  Lymphadenopathy:     He has no cervical adenopathy  Neurological: He is alert and oriented to person, place, and time  Skin: Skin is warm and dry  Capillary refill takes less than 2 seconds  Psychiatric: He has a normal mood and affect  Nursing note and vitals reviewed        Vital Signs  ED Triage Vitals [01/04/19 1636]   Temperature Pulse Respirations Blood Pressure SpO2   97 5 °F (36 4 °C) 73 16 132/63 97 %      Temp src Heart Rate Source Patient Position - Orthostatic VS BP Location FiO2 (%)   -- Monitor Sitting Left arm --      Pain Score       7           Vitals:    01/04/19 1636   BP: 132/63   Pulse: 73   Patient Position - Orthostatic VS: Sitting       Visual Acuity      ED Medications  Medications - No data to display    Diagnostic Studies  Results Reviewed     None                 No orders to display              Procedures  Procedures       Phone Contacts  ED Phone Contact    ED Course                               MDM  CritCare Time    Disposition  Final diagnoses:   Dental abscess     Time reflects when diagnosis was documented in both MDM as applicable and the Disposition within this note     Time User Action Codes Description Comment    1/4/2019  4:47 PM Latoya Nava Madyson [K04 7] Dental abscess       ED Disposition     ED Disposition Condition Comment    Discharge  [place] discharge to home/self care  Condition at discharge: Good        Follow-up Information     Follow up With Specialties Details Why [address]  Schedule an appointment as soon as possible for a visit  [address] [place] [address]          Patient's Medications   Discharge Prescriptions    AMOXICILLIN-CLAVULANATE (AUGMENTIN) 875-125 MG PER TABLET    Take 1 tablet by mouth 2 (two) times a day for 10 days       Start Date: 1/4/2019  End Date: 1/14/2019       Order Dose: 1 tablet       Quantity: 14 tablet    Refills: 0    IBUPROFEN (MOTRIN) 400 MG TABLET    Take 1 tablet (400 mg total) by mouth every 6 (six) hours as needed for mild pain       Start Date: 1/4/2019  End Date: --       Order Dose: 400 mg       Quantity: 12 tablet    Refills: 0     No discharge procedures on file      ED Provider  Electronically Signed by           Topher Madrid PA-C  01/04/19 1297

## 2019-01-04 NOTE — DISCHARGE INSTRUCTIONS
Dental Abscess   WHAT YOU NEED TO KNOW:   A dental abscess is a collection of pus in or around a tooth  A dental abscess is caused by bacteria  The bacteria usually enter the tooth when the enamel (outer part of the tooth) is damaged by tooth decay  Bacteria may also enter the tooth through a break or chip in the tooth, or a cut in the gum  Food particles that are stuck between the teeth for a long time may also lead to an abscess  DISCHARGE INSTRUCTIONS:   Return to the emergency department if:   · You have severe pain  · You have trouble breathing because of pain or swelling  Contact your healthcare provider if:   · Your symptoms get worse, even after treatment  · Your mouth is bleeding  · You cannot eat or drink because of pain or swelling  · Your abscess returns  · You have an injury that causes a crack in your tooth  · You have questions or concerns about your condition or care  Medicines: You may  need any of the following:  · Antibiotics  help treat a bacterial infection  · NSAIDs , such as ibuprofen, help decrease swelling, pain, and fever  This medicine is available with or without a doctor's order  NSAIDs can cause stomach bleeding or kidney problems in certain people  If you take blood thinner medicine, always ask your healthcare provider if NSAIDs are safe for you  Always read the medicine label and follow directions  · Acetaminophen  decreases pain and fever  It is available without a doctor's order  Ask how much to take and how often to take it  Follow directions  Read the labels of all other medicines you are using to see if they also contain acetaminophen, or ask your doctor or pharmacist  Acetaminophen can cause liver damage if not taken correctly  Do not use more than 4 grams (4,000 milligrams) total of acetaminophen in one day  · Prescription pain medicine  may be given  Ask your healthcare provider how to take this medicine safely   Some prescription pain medicines contain acetaminophen  Do not take other medicines that contain acetaminophen without talking to your healthcare provider  Too much acetaminophen may cause liver damage  Prescription pain medicine may cause constipation  Ask your healthcare provider how to prevent or treat constipation  · Take your medicine as directed  Contact your healthcare provider if you think your medicine is not helping or if you have side effects  Tell him of her if you are allergic to any medicine  Keep a list of the medicines, vitamins, and herbs you take  Include the amounts, and when and why you take them  Bring the list or the pill bottles to follow-up visits  Carry your medicine list with you in case of an emergency  Self-care:   · Rinse your mouth every 2 hours with salt water  This will help keep the area clean  · Gently brush your teeth twice a day with a soft tooth brush  This will help keep the area clean  · Eat soft foods as directed  Soft foods may cause less pain  Examples include applesauce, yogurt, and cooked pasta  Ask your healthcare provider how long to follow this instruction  · Apply a warm compress to your tooth or gum  Use a cotton ball or gauze soaked in warm water  Remove the compress in 10 minutes or when it becomes cool  Repeat 3 times a day  Prevent another abscess:   · Brush your teeth at least 2 times a day with fluoride toothpaste  · Use dental floss to clean between your teeth at least once a day  · Rinse your mouth with water or mouthwash after meals and snacks  · Chew sugarless gum after meals and snacks  · Limit foods that are sticky and high in sugar such as raisons  Also limit drinks high in sugar, such as soda  · See your dentist every 6 months for dental cleanings and oral exams  Follow up with your healthcare provider in 24 hours: Your healthcare provider will need to check your teeth and gums   Write down your questions so you remember to ask them during your visits  © 2017 [employer] Information is for End User's use only and may not be sold, redistributed or otherwise used for commercial purposes  All illustrations and images included in CareNotes® are the copyrighted property of [employer]  or [employer]Cla[employer] Morrow  The above information is an  only  It is not intended as medical advice for individual conditions or treatments  Talk to your doctor, nurse or pharmacist before following any medical regimen to see if it is safe and effective for you

## 2019-08-27 ENCOUNTER — HOSPITAL ENCOUNTER (EMERGENCY)
Facility: HOSPITAL | Age: 54
Discharge: HOME | End: 2019-08-27
Attending: EMERGENCY MEDICINE
Payer: COMMERCIAL

## 2019-08-27 VITALS
HEIGHT: 61 IN | WEIGHT: 175 LBS | BODY MASS INDEX: 33.04 KG/M2 | TEMPERATURE: 99.4 F | HEART RATE: 98 BPM | RESPIRATION RATE: 18 BRPM | DIASTOLIC BLOOD PRESSURE: 81 MMHG | SYSTOLIC BLOOD PRESSURE: 143 MMHG | OXYGEN SATURATION: 99 %

## 2019-08-27 DIAGNOSIS — T40.1X1A HEROIN OVERDOSE (HCC): Primary | ICD-10-CM

## 2019-08-27 PROCEDURE — 99284 EMERGENCY DEPT VISIT MOD MDM: CPT

## 2019-08-27 PROCEDURE — 99284 EMERGENCY DEPT VISIT MOD MDM: CPT | Performed by: EMERGENCY MEDICINE

## 2019-08-28 NOTE — ED PROVIDER NOTES
History  Chief Complaint   Patient presents with    Overdose - Accidental     Pt presents to ER after bystander called 911 as pt was nodding off outside of a local market place after shooting up 2 bags of heroin today  Patient was not given narcan and is arouseable to verbal stimuli as per EMS  Arrives in no apparent distress and reports only complaint is he is stressed and tired  Patient states he was clean for 2 years and today was the first time he used since  53yoM brought in by ems, was found down earlier today after injecting himself with heroin  No narcan was given  Patient states he was upset because one of his friends passed away yesterday and he decided to use heroin today  States he injected in right antecubital, and says he has not used heroin for years prior to this use  Denies any falls, trauma, injury, pain, fever, sob, numbness in extremities  Patient denies suicidal or homicidal ideations  Prior to Admission Medications   Prescriptions Last Dose Informant Patient Reported? Taking?   ibuprofen (MOTRIN) 400 mg tablet   No No   Sig: Take 1 tablet (400 mg total) by mouth every 6 (six) hours as needed for mild pain      Facility-Administered Medications: None       Past Medical History:   Diagnosis Date    Asthma     Bronchitis     COPD (chronic obstructive pulmonary disease) (HCC)     Hepatitis C     Pneumonia        Past Surgical History:   Procedure Laterality Date    MOUTH SURGERY         History reviewed  No pertinent family history  I have reviewed and agree with the history as documented  Social History     Tobacco Use    Smoking status: Current Every Day Smoker     Packs/day: 1 00     Types: Cigarettes    Smokeless tobacco: Never Used   Substance Use Topics    Alcohol use: No    Drug use: Yes     Types: Heroin     Comment: IV heroin        Review of Systems   Constitutional: Negative for fatigue and fever  HENT: Negative for ear pain and hearing loss      Eyes: Negative for pain  Respiratory: Negative for chest tightness and shortness of breath  Cardiovascular: Negative for chest pain  Gastrointestinal: Negative for abdominal pain, nausea and vomiting  Genitourinary: Negative for difficulty urinating, dysuria and urgency  Musculoskeletal: Negative for back pain  Skin: Negative for rash and wound  Neurological: Negative for syncope and headaches  Psychiatric/Behavioral: Negative for agitation  All other systems reviewed and are negative  Physical Exam  ED Triage Vitals [08/27/19 1858]   Temperature Pulse Respirations Blood Pressure SpO2   99 4 °F (37 4 °C) (!) 110 20 143/81 93 %      Temp Source Heart Rate Source Patient Position - Orthostatic VS BP Location FiO2 (%)   Oral Monitor Sitting Right arm --      Pain Score       No Pain             Orthostatic Vital Signs  Vitals:    08/27/19 1858 08/27/19 2046   BP: 143/81    Pulse: (!) 110 98   Patient Position - Orthostatic VS: Sitting        Physical Exam   Constitutional: He is oriented to person, place, and time  He appears well-developed and well-nourished  HENT:   Head: Normocephalic and atraumatic  Eyes: Pupils are equal, round, and reactive to light  Conjunctivae and EOM are normal  Right eye exhibits no discharge  Left eye exhibits no discharge  Neck: Normal range of motion  No JVD present  No tracheal deviation present  Cardiovascular: Normal rate, regular rhythm and normal heart sounds  No murmur heard  Pulmonary/Chest: Effort normal and breath sounds normal  He has no wheezes  Abdominal: Soft  Bowel sounds are normal  He exhibits no distension  There is no tenderness  Musculoskeletal: Normal range of motion  Neurological: He is alert and oriented to person, place, and time  Skin: Skin is warm and dry  He is not diaphoretic  Psychiatric: He has a normal mood and affect   His speech is normal  Judgment and thought content normal  Cognition and memory are normal    Nursing note and vitals reviewed  ED Medications  Medications - No data to display    Diagnostic Studies  Results Reviewed     None                 No orders to display         Procedures  Procedures        ED Course                               MDM  Number of Diagnoses or Management Options  Heroin overdose [facility]):   Diagnosis management comments: Patient was seen due to accidental overdose of heroin which did not require narcan or resuscitative measures  Patient is mentating appropriately in the ED with no complaints, denies any suicidal or homicidal ideations  Patient received information for drug rehabilitation  Return precautions advised      Disposition  Final diagnoses:   Heroin overdose ([address] )     Time reflects when diagnosis was documented in both MDM as applicable and the Disposition within this note     Time User Action Codes Description Comment    8/27/2019  8:18 PM Paster, [address] [facility] [address] Heroin overdose [facility])       ED Disposition     ED Disposition Condition Date/Time Comment    Discharge Stable Tue Aug 27, 2019  8:18 PM Nancy Looney discharge to home/self care  Follow-up Information     Follow up With Specialties Details Why Contact Info Additional Information    [employer]fol[employer]  Call   [phone]       [facility] Emergency Department Emergency Medicine  If symptoms worsen [address]  [phone]  ED, [address], [place], [address] [facility], [address]          Discharge Medication List as of 8/27/2019  8:19 PM      CONTINUE these medications which have NOT CHANGED    Details   ibuprofen (MOTRIN) 400 mg tablet Take 1 tablet (400 mg total) by mouth every 6 (six) hours as needed for mild pain, Starting Fri 1/4/2019, Print           No discharge procedures on file  ED Provider  Attending physically available and evaluated Nancy Looney I managed the patient along with the ED Attending      Electronically Signed by         Melvin Yeh [address] Rosetta Wu, DO  08/27/19 2227

## 2019-09-12 NOTE — ED ATTENDING ATTESTATION
Kareen Dover MD, saw and evaluated the patient  I have discussed the patient with the resident/non-physician practitioner and agree with the resident's/non-physician practitioner's findings, Plan of Care, and MDM as documented in the resident's/non-physician practitioner's note, except where noted  All available labs and Radiology studies were reviewed  I was present for key portions of any procedure(s) performed by the resident/non-physician practitioner and I was immediately available to provide assistance  At this point I agree with the current assessment done in the Emergency Department  I have conducted an independent evaluation of this patient a history and physical is as follows:    Patient brought in after being found down earlier today after injecting himself with heroin  Patient was upset because a friend  yesterday and he decided use heroin  Patient denies any suicidal intention and states that he has not used heroin for years prior to this  Patient did not require any Narcan  No additional complaints  Exam: AAOx3, NAD, RRR, CTA, S/NT/ND, no motor/sensory deficits  A/P:  Heroin abuse    Given the patient did not require any reversal agents, will discharge    Critical Care Time  Procedures

## 2019-11-23 ENCOUNTER — HOSPITAL ENCOUNTER (EMERGENCY)
Facility: HOSPITAL | Age: 54
Discharge: HOME | End: 2019-11-23
Attending: EMERGENCY MEDICINE
Payer: MEDICARE

## 2019-11-23 VITALS
WEIGHT: 174.6 LBS | DIASTOLIC BLOOD PRESSURE: 101 MMHG | BODY MASS INDEX: 32.99 KG/M2 | RESPIRATION RATE: 18 BRPM | OXYGEN SATURATION: 97 % | TEMPERATURE: 97.9 F | SYSTOLIC BLOOD PRESSURE: 174 MMHG | HEART RATE: 112 BPM

## 2019-11-23 DIAGNOSIS — T40.1X1A HEROIN OVERDOSE (HCC): Primary | ICD-10-CM

## 2019-11-23 PROCEDURE — 99282 EMERGENCY DEPT VISIT SF MDM: CPT | Performed by: EMERGENCY MEDICINE

## 2019-11-23 PROCEDURE — 99284 EMERGENCY DEPT VISIT MOD MDM: CPT

## 2019-11-23 RX ORDER — NALOXONE HYDROCHLORIDE 1 MG/ML
INJECTION INTRAMUSCULAR; INTRAVENOUS; SUBCUTANEOUS
Status: COMPLETED
Start: 2019-11-23 | End: 2019-11-23

## 2019-11-23 NOTE — ED PROVIDER NOTES
History  Chief Complaint   Patient presents with    Heroin Overdose - Accidental     EMS stated that pt was found unresponsive and was blue in color  EMS stated that gf called  EMS gave pt 1mg narcan IV, 18g left AC  pt is alert and oriented at this time  pt states that he used 1-2 bags of heroin IV but denies meth      Patient is a 60-year-old male history of asthma, COPD hepatitis, presents the emergency room for heroin overdose  Patient admits to injecting heroin with a random girlfriend" that he had just met  States he has been clean for a while but decided to use again with a girl tonight  Per EMS, patient received 1 dose of Narcan in the field and became awake alert and oriented after initially being somnolent with pinpoint pupils  Patient now awake alert oriented  States he does not want to be evaluated for rehab or detox program   Otherwise without complaints  Specifically no chest pain, shortness of breath or difficulty breathing  Prior to Admission Medications   Prescriptions Last Dose Informant Patient Reported? Taking?   ibuprofen (MOTRIN) 400 mg tablet   No No   Sig: Take 1 tablet (400 mg total) by mouth every 6 (six) hours as needed for mild pain      Facility-Administered Medications: None       Past Medical History:   Diagnosis Date    Asthma     Bronchitis     COPD (chronic obstructive pulmonary disease) (HCC)     Hepatitis C     Pneumonia        Past Surgical History:   Procedure Laterality Date    MOUTH SURGERY         History reviewed  No pertinent family history  I have reviewed and agree with the history as documented  Social History     Tobacco Use    Smoking status: Current Every Day Smoker     Packs/day: 1 00     Types: Cigarettes    Smokeless tobacco: Never Used   Substance Use Topics    Alcohol use: No    Drug use: Yes     Types: Heroin, Methamphetamines     Comment: IV heroin        Review of Systems   Constitutional: Negative    Negative for chills and fever    HENT: Negative  Negative for rhinorrhea, sore throat, trouble swallowing and voice change  Eyes: Negative  Negative for pain and visual disturbance  Respiratory: Negative  Negative for cough, shortness of breath and wheezing  Cardiovascular: Negative  Negative for chest pain and palpitations  Gastrointestinal: Negative for abdominal pain, diarrhea, nausea and vomiting  Genitourinary: Negative  Negative for dysuria and frequency  Musculoskeletal: Negative  Negative for neck pain and neck stiffness  Skin: Negative  Negative for rash  Neurological: Negative  Negative for dizziness, speech difficulty, weakness, light-headedness and numbness  Physical Exam  Physical Exam   Constitutional: He is oriented to person, place, and time  He appears well-developed and well-nourished  No distress  HENT:   Head: Normocephalic and atraumatic  Mouth/Throat: Oropharynx is clear and moist    Eyes: Pupils are equal, round, and reactive to light  Conjunctivae and EOM are normal    Neck: Normal range of motion  Neck supple  No tracheal deviation present  Cardiovascular: Regular rhythm and intact distal pulses  Tachycardia present  Pulmonary/Chest: Effort normal and breath sounds normal  No respiratory distress  He has no wheezes  He has no rales  Abdominal: Soft  Bowel sounds are normal  He exhibits no distension  There is no tenderness  There is no rebound and no guarding  Musculoskeletal: Normal range of motion  He exhibits no tenderness or deformity  Neurological: He is alert and oriented to person, place, and time  Skin: Skin is warm and dry  Capillary refill takes less than 2 seconds  No rash noted  Psychiatric: He has a normal mood and affect  His behavior is normal    Nursing note and vitals reviewed        Vital Signs  ED Triage Vitals   Temperature Pulse Respirations Blood Pressure SpO2   11/23/19 0100 11/23/19 0100 11/23/19 0100 11/23/19 0059 11/23/19 0100   97 9 °F (36 6 °C) (!) 112 18 164/84 97 %      Temp Source Heart Rate Source Patient Position - Orthostatic VS BP Location FiO2 (%)   11/23/19 0100 11/23/19 0100 11/23/19 0059 11/23/19 0059 --   Tympanic Monitor Sitting Right arm       Pain Score       --                  Vitals:    11/23/19 0059 11/23/19 0100   BP: 164/84 (!) 174/101   Pulse:  (!) 112   Patient Position - Orthostatic VS: Sitting Sitting         Visual Acuity      ED Medications  Medications   naloxone (NARCAN) 2 MG/2ML injection **ADS Override Pull** (  Given to EMS 11/23/19 0059)       Diagnostic Studies  Results Reviewed     None                 No orders to display              Procedures  Procedures       ED Course                               MDM  Number of Diagnoses or Management Options  Heroin overdose [facility]):   Diagnosis management comments: Patient is a 71-year-old male who presented to the emergency room for concerns of heroin overdose  Other than some mild tachycardia, his physical exam is otherwise benign  He has declined to be evaluated for rehabilitation program   Patient will be monitored for short period in the emergency room and if has no complications and heart rate improved, can be discharged for outpatient follow-up with his family medicine provider  Disposition  Final diagnoses:   Heroin overdose ([address] )     Time reflects when diagnosis was documented in both MDM as applicable and the Disposition within this note     Time User Action Codes Description Comment    11/23/2019  1:30 AM Glenn Friedman Add [T40 1X1A] Heroin overdose [facility])       ED Disposition     ED Disposition Condition Date/Time Comment    Discharge Stable Sat Nov 23, 2019  1:31 AM Elvira Morel discharge to home/self care  Follow-up Information     Follow up With Specialties Details Why Contact Info    AVERY Butler MD Internal Medicine   [address]  [employer]    [address] [place] [address]  [phone]            Discharge Medication List as of 11/23/2019  1:31 AM      CONTINUE these medications which have NOT CHANGED    Details   ibuprofen (MOTRIN) 400 mg tablet Take 1 tablet (400 mg total) by mouth every 6 (six) hours as needed for mild pain, Starting Fri 1/4/2019, Print           No discharge procedures on file      ED Provider  Electronically Signed by           Mathieu Fraser DO  11/23/19 8703

## 2020-09-03 ENCOUNTER — HOSPITAL ENCOUNTER (EMERGENCY)
Facility: HOSPITAL | Age: 55
Discharge: HOME | End: 2020-09-03
Attending: EMERGENCY MEDICINE | Admitting: EMERGENCY MEDICINE
Payer: COMMERCIAL

## 2020-09-03 VITALS
HEART RATE: 94 BPM | DIASTOLIC BLOOD PRESSURE: 73 MMHG | OXYGEN SATURATION: 98 % | WEIGHT: 156.53 LBS | SYSTOLIC BLOOD PRESSURE: 117 MMHG | RESPIRATION RATE: 18 BRPM | TEMPERATURE: 96.5 F | BODY MASS INDEX: 29.58 KG/M2

## 2020-09-03 DIAGNOSIS — L25.5 TOXICODENDRON DERMATITIS: Primary | ICD-10-CM

## 2020-09-03 PROCEDURE — 99282 EMERGENCY DEPT VISIT SF MDM: CPT

## 2020-09-03 PROCEDURE — 99284 EMERGENCY DEPT VISIT MOD MDM: CPT | Performed by: EMERGENCY MEDICINE

## 2020-09-03 RX ORDER — PREDNISONE 50 MG/1
TABLET ORAL
Qty: 40 TABLET | Refills: 0 | Status: SHIPPED | OUTPATIENT
Start: 2020-09-03

## 2020-09-03 NOTE — ED PROVIDER NOTES
History  Chief Complaint   Patient presents with    Rash     Patient reports red rash on arms and legs for one week     Patient is a 52-year-old male  He presents to the emergency room complaining of an itchy rash  He thinks it might be poison oak or poison sumac  It is mostly to his arms and legs  Is been going on for about a week  No associated fever  Prior to Admission Medications   Prescriptions Last Dose Informant Patient Reported? Taking?   ibuprofen (MOTRIN) 400 mg tablet   No No   Sig: Take 1 tablet (400 mg total) by mouth every 6 (six) hours as needed for mild pain      Facility-Administered Medications: None       Past Medical History:   Diagnosis Date    Asthma     Bronchitis     COPD (chronic obstructive pulmonary disease) (HCC)     Hepatitis C     Pneumonia        Past Surgical History:   Procedure Laterality Date    MOUTH SURGERY         History reviewed  No pertinent family history  I have reviewed and agree with the history as documented  E-Cigarette/Vaping     E-Cigarette/Vaping Substances     Social History     Tobacco Use    Smoking status: Current Every Day Smoker     Packs/day: 1 00     Types: Cigarettes    Smokeless tobacco: Never Used   Substance Use Topics    Alcohol use: No    Drug use: Yes     Types: Marijuana     Comment: IV heroin       Review of Systems   Constitutional: Negative for chills and fever  Skin: Positive for rash  Negative for wound  Physical Exam  Physical Exam  Vitals signs reviewed  Constitutional:       General: He is not in acute distress  HENT:      Head: Normocephalic and atraumatic  Nose: Nose normal       Mouth/Throat:      Mouth: Mucous membranes are moist    Eyes:      General:         Right eye: No discharge  Left eye: No discharge  Conjunctiva/sclera: Conjunctivae normal    Neck:      Musculoskeletal: Normal range of motion and neck supple     Pulmonary:      Effort: Pulmonary effort is normal  No respiratory distress  Musculoskeletal:         General: No deformity or signs of injury  Skin:     General: Skin is warm and dry  Coloration: Skin is not pale  Findings: Rash present  Comments: Rashes present mostly to the arms and legs  It is red, patchy, crusty  There are several lesions that her linear  Neurological:      General: No focal deficit present  Mental Status: He is alert and oriented to person, place, and time  Psychiatric:         Mood and Affect: Mood normal          Behavior: Behavior normal          Vital Signs  ED Triage Vitals [09/03/20 1520]   Temperature Pulse Respirations Blood Pressure SpO2   (!) 97 2 °F (36 2 °C) 94 18 117/73 98 %      Temp Source Heart Rate Source Patient Position - Orthostatic VS BP Location FiO2 (%)   Tympanic Monitor Sitting Left arm --      Pain Score       --           Vitals:    09/03/20 1520   BP: 117/73   Pulse: 94   Patient Position - Orthostatic VS: Sitting         Visual Acuity      ED Medications  Medications - No data to display    Diagnostic Studies  Results Reviewed     None                 No orders to display              Procedures  Procedures         ED Course       US AUDIT      Most Recent Value   Initial Alcohol Screen: US AUDIT-C    1  How often do you have a drink containing alcohol?  0 Filed at: 09/03/2020 1525   2  How many drinks containing alcohol do you have on a typical day you are drinking? 0 Filed at: 09/03/2020 1525   3a  Male UNDER 65: How often do you have five or more drinks on one occasion? 0 Filed at: 09/03/2020 1525   3b  FEMALE Any Age, or MALE 65+: How often do you have 4 or more drinks on one occassion? 0 Filed at: 09/03/2020 1525   Audit-C Score  0 Filed at: 09/03/2020 1525                  BREANNA/DAST-10      Most Recent Value   How many times in the past year have you    Used an illegal drug or used a prescription medication for non-medical reasons?   Once or Twice Filed at: 09/03/2020 1525   In the past 12 months      1  Have you used drugs other than those required for medical reasons? 1 Filed at: 09/03/2020 1525   2  Do you use more than one drug at a time? 0 Filed at: 09/03/2020 1525   3  Have you had medical problems as a result of your drug use (e g , memory loss, hepatitis, convulsions, bleeding, etc )? 0 Filed at: 09/03/2020 1525   4  Have you had "blackouts" or "flashbacks" as a result of drug use? YesNo  0 Filed at: 09/03/2020 1525   5  Do you ever feel bad or guilty about your drug use? 0 Filed at: 09/03/2020 1525   6  Does your spouse (or parent) ever complain about your involvement with drugs? 0 Filed at: 09/03/2020 1525   7  Have you neglected your family because of your use of drugs? 0 Filed at: 09/03/2020 1525   8  Have you engaged in illegal activities in order to obtain drugs? 0 Filed at: 09/03/2020 1525   9  Have you ever experienced withdrawal symptoms (felt sick) when you stopped taking drugs? 0 Filed at: 09/03/2020 1525   10  Are you always able to stop using drugs when you want to?  0 Filed at: 09/03/2020 1525   DAST-10 Score  1 Filed at: 09/03/2020 1525                                MDM  Number of Diagnoses or Management Options  Toxicodendron dermatitis:   Diagnosis management comments: Patient has lesions that are pathoneumonic for toxidendron dermatitis  Other contact dermatitis would be less likely  This is not eczema  There is no cellulitis  Disposition  Final diagnoses:   Toxicodendron dermatitis     Time reflects when diagnosis was documented in both MDM as applicable and the Disposition within this note     Time User Action Codes Description Comment    9/3/2020  3:47 PM Jadon Snyder Add [L25 5] Toxicodendron dermatitis       ED Disposition     ED Disposition Condition Date/Time Comment    Discharge Stable Thu Sep 3, 2020  3:47 PM Fabiola Rowley discharge to home/self care              Follow-up Information     Follow up With Specialties Details Why Contact Carlita Perez MD Internal Medicine In 2 weeks  [address]  [facility]  [address] [phone]            Patient's Medications   Discharge Prescriptions    PREDNISONE 50 MG TABLET    60mg po qd x 3 days, then 40mg po qd x 3 days, then 20mg po qd x 3 days, then 10mg po qd x 3 days, then stop       Start Date: 9/3/2020  End Date: --       Order Dose: --       Quantity: 40 tablet    Refills: 0     No discharge procedures on file      PDMP Review     None          ED Provider  Electronically Signed by           Srinivas White MD  09/03/20 4890

## 2020-09-04 ENCOUNTER — HOSPITAL ENCOUNTER (EMERGENCY)
Facility: HOSPITAL | Age: 55
Discharge: HOME | End: 2020-09-04
Attending: EMERGENCY MEDICINE
Payer: COMMERCIAL

## 2020-09-04 VITALS
WEIGHT: 158.73 LBS | HEART RATE: 91 BPM | SYSTOLIC BLOOD PRESSURE: 144 MMHG | TEMPERATURE: 95.5 F | RESPIRATION RATE: 18 BRPM | OXYGEN SATURATION: 97 % | DIASTOLIC BLOOD PRESSURE: 80 MMHG | BODY MASS INDEX: 29.99 KG/M2

## 2020-09-04 DIAGNOSIS — T40.1X1A HEROIN OVERDOSE, ACCIDENTAL OR UNINTENTIONAL, INITIAL ENCOUNTER (HCC): Primary | ICD-10-CM

## 2020-09-04 PROCEDURE — 99284 EMERGENCY DEPT VISIT MOD MDM: CPT

## 2020-09-04 PROCEDURE — 99282 EMERGENCY DEPT VISIT SF MDM: CPT | Performed by: PHYSICIAN ASSISTANT

## 2020-09-04 NOTE — ED NOTES
Pt repeatedly asked for a knife and his teeth that he had on him prior to the overdose  After checking with security and the room multiple times we do not have anything of his in the hospital  The patient was given his jacket that he left in the room upon exit  Security was present in the waiting room as he was leaving the unit  The patient was given a phone number for [facility] to call about anything that was confiscated during transport        Hayley Cerrato  09/04/20 1325

## 2020-09-04 NOTE — ED PROVIDER NOTES
History  Chief Complaint   Patient presents with    Heroin Overdose - Accidental     pt arrives via [facility] with reports of " found in back of [address] with needle in arm "  pt reports using 2 bags heoin today  denies SI       Patient is a 81-year-old male presents today via EMS for evaluation after heroin overdose  Patient admits to using 2 bags of heroin reports he snorted these with no intention to harm himself and no suicidal ideation  Patient denies homicidal ideation reports he was using this drug recreationally  Patient reports he does not typically use heroin as he has been clean for multiple years patient reports he did not use any other recreational drug at this time patient denies any complaints at this time  Declines rehabilitation services today  EMS accompanies the patient reporting he was found in the back of a wall while with a needle in his arm [address] reports they administered 1 mg of IV Narcan  History provided by:  Patient   used: No    Heroin Overdose - Intentional   Location:  Intranasal  Quality:  Heroin overdose  Severity:  Moderate  Onset quality:  Gradual  Duration:  1 hour  Timing:  Constant  Progression:  Unchanged  Chronicity:  New  Context:  Intranasal heroin overdose  Relieved by:  Narcan  Worsened by:  None  Associated symptoms: no abdominal pain, no chest pain, no congestion, no ear pain, no fatigue, no fever, no nausea, no rash, no rhinorrhea, no shortness of breath, no sore throat and no vomiting        Prior to Admission Medications   Prescriptions Last Dose Informant Patient Reported?  Taking?   ibuprofen (MOTRIN) 400 mg tablet   No No   Sig: Take 1 tablet (400 mg total) by mouth every 6 (six) hours as needed for mild pain   predniSONE 50 mg tablet   No No   Simg po qd x 3 days, then 40mg po qd x 3 days, then 20mg po qd x 3 days, then 10mg po qd x 3 days, then stop      Facility-Administered Medications: None       Past Medical History: Diagnosis Date    Asthma     Bronchitis     COPD (chronic obstructive pulmonary disease) (HCC)     Hepatitis C     Pneumonia        Past Surgical History:   Procedure Laterality Date    MOUTH SURGERY         History reviewed  No pertinent family history  I have reviewed and agree with the history as documented  E-Cigarette/Vaping     E-Cigarette/Vaping Substances     Social History     Tobacco Use    Smoking status: Current Every Day Smoker     Packs/day: 1 00     Types: Cigarettes    Smokeless tobacco: Never Used   Substance Use Topics    Alcohol use: Yes    Drug use: Yes     Types: Marijuana, Heroin     Comment: IV heroin use today       Review of Systems   Constitutional: Negative for chills, fatigue and fever  HENT: Negative for congestion, ear pain, rhinorrhea and sore throat  Eyes: Negative for redness  Respiratory: Negative for chest tightness and shortness of breath  Cardiovascular: Negative for chest pain and palpitations  Gastrointestinal: Negative for abdominal pain, nausea and vomiting  Genitourinary: Negative for dysuria and hematuria  Musculoskeletal: Negative  Skin: Negative for rash  Neurological: Negative for dizziness, syncope, light-headedness and numbness  Physical Exam  Physical Exam  Vitals signs and nursing note reviewed  Constitutional:       Appearance: He is well-developed  HENT:      Head: Normocephalic and atraumatic  Eyes:      General: No scleral icterus  Pupils: Pupils are equal, round, and reactive to light  Neck:      Musculoskeletal: Normal range of motion  Cardiovascular:      Rate and Rhythm: Normal rate and regular rhythm  Heart sounds: Normal heart sounds  Pulmonary:      Effort: Pulmonary effort is normal       Breath sounds: Normal breath sounds  Abdominal:      Palpations: Abdomen is soft  Tenderness: There is no abdominal tenderness  There is no guarding     Lymphadenopathy:      Cervical: No cervical adenopathy  Skin:     General: Skin is warm and dry  Capillary Refill: Capillary refill takes less than 2 seconds  Findings: Rash ( Patient with excoriated skin bilateral arms appears consistent with picking/formication) present  Neurological:      Mental Status: He is alert and oriented to person, place, and time  Vital Signs  ED Triage Vitals [09/04/20 1204]   Temperature Pulse Respirations Blood Pressure SpO2   (!) 95 5 °F (35 3 °C) 104 20 144/80 95 %      Temp Source Heart Rate Source Patient Position - Orthostatic VS BP Location FiO2 (%)   Tympanic Monitor Sitting Left arm --      Pain Score       --           Vitals:    09/04/20 1204 09/04/20 1231   BP: 144/80    Pulse: 104 91   Patient Position - Orthostatic VS: Sitting          Visual Acuity      ED Medications  Medications - No data to display    Diagnostic Studies  Results Reviewed     None                 No orders to display              Procedures  Procedures         ED Course       US AUDIT      Most Recent Value   Initial Alcohol Screen: US AUDIT-C    1  How often do you have a drink containing alcohol? 3 Filed at: 09/04/2020 1205   3a  Male UNDER 65: How often do you have five or more drinks on one occasion? 3 Filed at: 09/04/2020 1205   Audit-C Score  6 Filed at: 09/04/2020 1205                  BREANNA/DAST-10      Most Recent Value   How many times in the past year have you    Used an illegal drug or used a prescription medication for non-medical reasons? Daily or Almost Daily Filed at: 09/04/2020 1206                                MDM  Number of Diagnoses or Management Options  Diagnosis management comments: All imaging and/or lab testing discussed with patient, strict return to ED precautions discussed  Patient recommended to follow up promptly with appropriate outpatient provider  Patient and/or family members verbalizes understanding and agrees with plan   Patient is stable for discharge      Portions of the record may have been created with voice recognition software  Occasional wrong word or "sound a like" substitutions may have occurred due to the inherent limitations of voice recognition software  Read the chart carefully and recognize, using context, where substitutions have occurred  Disposition  Final diagnoses:   Heroin overdose, accidental or unintentional, initial encounter [facility])     Time reflects when diagnosis was documented in both MDM as applicable and the Disposition within this note     Time User Action Codes Description Comment    9/4/2020 12:43 PM Angela, [address]  Heroin overdose, accidental or unintentional, initial encounter [facility])       ED Disposition     ED Disposition Condition Date/Time Comment    Discharge Good Fri Sep 4, 2020 12:43 PM Harshad North discharge to home/self care  Follow-up Information     Follow up With Specialties Details Why Contact Info    AVERY Hernández MD Internal Medicine Schedule an appointment as soon as possible for a visit in 2 days  [address]  [facility]  [facility]      Germania Hurley MD Family Medicine Schedule an appointment as soon as possible for a visit in 2 days  [address] [facility] [address]             Discharge Medication List as of 9/4/2020 12:43 PM      CONTINUE these medications which have NOT CHANGED    Details   ibuprofen (MOTRIN) 400 mg tablet Take 1 tablet (400 mg total) by mouth every 6 (six) hours as needed for mild pain, Starting Fri 1/4/2019, Print      predniSONE 50 mg tablet 60mg po qd x 3 days, then 40mg po qd x 3 days, then 20mg po qd x 3 days, then 10mg po qd x 3 days, then stop, Print           No discharge procedures on file      PDMP Review     None          ED Provider  Electronically Signed by           Girma Villatoro PA-C  09/04/20 5303

## 2020-09-11 ENCOUNTER — HOSPITAL ENCOUNTER (EMERGENCY)
Facility: HOSPITAL | Age: 55
Discharge: HOME | End: 2020-09-11
Attending: EMERGENCY MEDICINE | Admitting: EMERGENCY MEDICINE
Payer: COMMERCIAL

## 2020-09-11 VITALS
WEIGHT: 155 LBS | OXYGEN SATURATION: 95 % | TEMPERATURE: 97.5 F | RESPIRATION RATE: 20 BRPM | BODY MASS INDEX: 29.29 KG/M2 | HEART RATE: 91 BPM | DIASTOLIC BLOOD PRESSURE: 75 MMHG | SYSTOLIC BLOOD PRESSURE: 132 MMHG

## 2020-09-11 DIAGNOSIS — R21 RASH: Primary | ICD-10-CM

## 2020-09-11 DIAGNOSIS — Z20.818 EXPOSURE TO MRSA: ICD-10-CM

## 2020-09-11 DIAGNOSIS — R21 RASH AND NONSPECIFIC SKIN ERUPTION: ICD-10-CM

## 2020-09-11 PROCEDURE — 99284 EMERGENCY DEPT VISIT MOD MDM: CPT | Performed by: EMERGENCY MEDICINE

## 2020-09-11 PROCEDURE — 99282 EMERGENCY DEPT VISIT SF MDM: CPT

## 2020-09-11 RX ORDER — AMOXICILLIN AND CLAVULANATE POTASSIUM 875; 125 MG/1; MG/1
1 TABLET, FILM COATED ORAL EVERY 12 HOURS
Qty: 14 TABLET | Refills: 0 | Status: SHIPPED | OUTPATIENT
Start: 2020-09-11 | End: 2020-09-18

## 2020-09-11 RX ORDER — AMOXICILLIN AND CLAVULANATE POTASSIUM 875; 125 MG/1; MG/1
1 TABLET, FILM COATED ORAL ONCE
Status: COMPLETED | OUTPATIENT
Start: 2020-09-11 | End: 2020-09-11

## 2020-09-11 RX ADMIN — AMOXICILLIN AND CLAVULANATE POTASSIUM 1 TABLET: 875; 125 TABLET, FILM COATED ORAL at 19:04

## 2020-09-11 NOTE — ED NOTES
[facility] web site indicates: Active  Recipient # [id number]  Behavioral Health managed by [facility]  Phone number: [phone]

## 2020-09-11 NOTE — ED PROVIDER NOTES
History  Chief Complaint   Patient presents with    Rash     Patient reports a rash on his right hand and b/l legs for the past 3 days      Patient is a 31-year-old male coming in today for rash  He states he believes it is MRSA  He states that these are similar to his MRSA infections in the past   There on bilateral dorsal surface of the hands and the right knee  He has no fevers, chills, nausea vomiting  He has a known drug abuser  He is afebrile hearing  He is hemodynamically stable but mildly tachycardic  Will re check vital signs  He has no other complaints at this time  History provided by:  Patient   used: No    Rash   Location: Bilateral dorsal surface of hands and left knee  Quality: painful and redness    Pain details:     Quality:  Throbbing    Severity:  Mild    Onset quality:  Gradual  Severity:  Mild  Onset quality:  Gradual  Timing:  Constant  Progression:  Unchanged  Chronicity:  Recurrent  Context: not animal contact, not chemical exposure, not diapers, not eggs, not exposure to similar rash, not food, not hot tub use, not insect bite/sting, not medications, not new detergent/soap, not nuts, not plant contact, not pollen, not pregnancy, not sick contacts and not sun exposure    Relieved by:  None tried  Worsened by:  Nothing  Ineffective treatments:  None tried  Associated symptoms: no abdominal pain, no diarrhea, no fatigue, no fever, no headaches, no hoarse voice, no induration, no joint pain, no myalgias, no nausea, no periorbital edema, no shortness of breath, no sore throat, no throat swelling, no tongue swelling, no URI, not vomiting and not wheezing        Prior to Admission Medications   Prescriptions Last Dose Informant Patient Reported?  Taking?   ibuprofen (MOTRIN) 400 mg tablet Not Taking at Unknown time  No No   Sig: Take 1 tablet (400 mg total) by mouth every 6 (six) hours as needed for mild pain   Patient not taking: Reported on 9/11/2020   predniSONE 50 mg tablet   No Yes   Simg po qd x 3 days, then 40mg po qd x 3 days, then 20mg po qd x 3 days, then 10mg po qd x 3 days, then stop      Facility-Administered Medications: None       Past Medical History:   Diagnosis Date    Asthma     Bronchitis     COPD (chronic obstructive pulmonary disease) (HCC)     Hepatitis C     Pneumonia        Past Surgical History:   Procedure Laterality Date    MOUTH SURGERY         History reviewed  No pertinent family history  I have reviewed and agree with the history as documented  E-Cigarette/Vaping     E-Cigarette/Vaping Substances     Social History     Tobacco Use    Smoking status: Current Every Day Smoker     Packs/day: 1 00     Types: Cigarettes    Smokeless tobacco: Never Used   Substance Use Topics    Alcohol use: Yes    Drug use: Yes     Types: Marijuana, Heroin     Comment: IV heroin use today       Review of Systems   Constitutional: Negative for fatigue and fever  HENT: Negative for hoarse voice and sore throat  Respiratory: Negative for shortness of breath and wheezing  Gastrointestinal: Negative for abdominal pain, diarrhea, nausea and vomiting  Musculoskeletal: Negative for arthralgias and myalgias  Skin: Positive for rash  Neurological: Negative for headaches  Physical Exam  Physical Exam  Vitals signs and nursing note reviewed  Constitutional:       General: He is not in acute distress  Appearance: He is well-developed  He is not diaphoretic  Comments: Patient is disheveled at appears older than stated age   HENT:      Head: Normocephalic and atraumatic  Comments: Patient maintaining airway maintaining secretions  Uvula midline without edema  No stridor  Eyes:      Conjunctiva/sclera: Conjunctivae normal       Pupils: Pupils are equal, round, and reactive to light  Neck:      Musculoskeletal: Normal range of motion and neck supple     Pulmonary:      Effort: Pulmonary effort is normal  No respiratory distress  Breath sounds: No stridor  Musculoskeletal: Normal range of motion  Skin:     General: Skin is warm  Capillary Refill: Capillary refill takes less than 2 seconds  Comments: There is noted several small areas of skin wound on dorsal surface of bilateral hands as well as left knee  There is noted 1 small wound on the right hand and 1 small wound on left hand  There is 2 small wounds on the left knee  Patient has full active range of motion and no set evidence of septic joints   Neurological:      Mental Status: He is alert and oriented to person, place, and time  Cranial Nerves: No cranial nerve deficit  Comments: GCS 15  No slurred speech  No facial asymmetry  No ataxia         Vital Signs  ED Triage Vitals   Temperature Pulse Respirations Blood Pressure SpO2   09/11/20 1805 09/11/20 1805 09/11/20 1805 09/11/20 1808 09/11/20 1805   97 5 °F (36 4 °C) 102 18 130/90 97 %      Temp Source Heart Rate Source Patient Position - Orthostatic VS BP Location FiO2 (%)   09/11/20 1805 09/11/20 1805 09/11/20 1805 09/11/20 1805 --   Oral Monitor Sitting Left arm       Pain Score       09/11/20 1905       7           Vitals:    09/11/20 1805 09/11/20 1808 09/11/20 1905   BP:  130/90 132/75   Pulse: 102  91   Patient Position - Orthostatic VS: Sitting           Visual Acuity      ED Medications  Medications   amoxicillin-clavulanate (AUGMENTIN) 875-125 mg per tablet 1 tablet (1 tablet Oral Given 9/11/20 1904)       Diagnostic Studies  Results Reviewed     None                 No orders to display              Procedures  Procedures         ED Course  ED Course as of Sep 11 2148   Fri Sep 11, 2020   1831 Patient is a 55-year-old male coming in today complaining of rash as consistent with MRSA  He has diffuse multiple small areas of rash consistent with small abscesses of MRSA  He is allergic to Bactrim  Will recheck vital signs and DC home with Augmentin      Patient has no evidence of septicemia or septic joints  Portions of the record may have been created with voice recognition software  Occasional wrong word or "sound a like" substitutions may have occurred due to the inherent limitations of voice recognition software  Read the chart carefully and recognize, using context, where substitutions have occurred  1912 Patient tells RN upon discharge, that he was bit by a snake  However, when I went to evaluate patient he states that this happened 3 days ago to right dorsal hand  No evidence of bite  Given length of time there is low suspicion or concern for envenomation  SBIRT 20yo+      Most Recent Value   SBIRT (22 yo +)   In order to provide better care to our patients, we are screening all of our patients for alcohol and drug use  Would it be okay to ask you these screening questions? No Filed at: 09/11/2020 1906                  MDM    Disposition  Final diagnoses:   Rash   Rash and nonspecific skin eruption   Exposure to MRSA     Time reflects when diagnosis was documented in both MDM as applicable and the Disposition within this note     Time User Action Codes Description Comment    9/11/2020  6:32 PM Kori Mace Add [R21] Rash     9/11/2020  6:32 PM Fior Taylor Add [R21] Rash and nonspecific skin eruption     9/11/2020  6:33 PM Epifanio Taylor Add [Z20 818] Exposure to MRSA       ED Disposition     ED Disposition Condition Date/Time Comment    Discharge Stable Fri Sep 11, 2020  6:32 PM Lawrance Flash discharge to home/self care  Follow-up Information     Follow up With Specialties Details Why Contact Info Additional Information    AEVRY Muñoz MD Internal Medicine   [address] [address]  Medicine Schedule an appointment as soon as possible for a visit in 1 week  [address], [address] [facility] [id number]  [address], [address], [address] [place], [place], [address]          Discharge Medication List as of 9/11/2020  6:33 PM      START taking these medications    Details   amoxicillin-clavulanate (AUGMENTIN) 875-125 mg per tablet Take 1 tablet by mouth every 12 (twelve) hours for 7 days, Starting Fri 9/11/2020, Until Fri 9/18/2020, Print         CONTINUE these medications which have NOT CHANGED    Details   predniSONE 50 mg tablet 60mg po qd x 3 days, then 40mg po qd x 3 days, then 20mg po qd x 3 days, then 10mg po qd x 3 days, then stop, Print      ibuprofen (MOTRIN) 400 mg tablet Take 1 tablet (400 mg total) by mouth every 6 (six) hours as needed for mild pain, Starting Fri 1/4/2019, Print           No discharge procedures on file      PDMP Review     None          ED Provider  Electronically Signed by           Tristin Baker,   09/11/20 Stevie [id number], DO  09/11/20 2048

## 2020-11-03 ENCOUNTER — HOSPITAL ENCOUNTER (EMERGENCY)
Facility: HOSPITAL | Age: 55
Discharge: HOME | End: 2020-11-03
Attending: EMERGENCY MEDICINE | Admitting: EMERGENCY MEDICINE
Payer: COMMERCIAL

## 2020-11-03 ENCOUNTER — APPOINTMENT (EMERGENCY)
Dept: ULTRASOUND IMAGING | Facility: HOSPITAL | Age: 55
End: 2020-11-03
Payer: COMMERCIAL

## 2020-11-03 VITALS
HEART RATE: 89 BPM | SYSTOLIC BLOOD PRESSURE: 124 MMHG | OXYGEN SATURATION: 98 % | BODY MASS INDEX: 31.18 KG/M2 | TEMPERATURE: 96.8 F | DIASTOLIC BLOOD PRESSURE: 72 MMHG | WEIGHT: 165 LBS | RESPIRATION RATE: 16 BRPM

## 2020-11-03 DIAGNOSIS — N45.1 RIGHT EPIDIDYMITIS: Primary | ICD-10-CM

## 2020-11-03 DIAGNOSIS — N50.819 TESTICULAR PAIN: ICD-10-CM

## 2020-11-03 LAB
ANION GAP SERPL CALCULATED.3IONS-SCNC: 4 MMOL/L (ref 5–14)
BACTERIA UR QL AUTO: ABNORMAL /HPF
BASOPHILS # BLD AUTO: 0.1 THOUSANDS/ΜL (ref 0–0.1)
BASOPHILS NFR BLD AUTO: 1 % (ref 0–1)
BILIRUB UR QL STRIP: NEGATIVE
BUN SERPL-MCNC: 16 MG/DL (ref 5–25)
CALCIUM SERPL-MCNC: 9.5 MG/DL (ref 8.4–10.2)
CHLORIDE SERPL-SCNC: 105 MMOL/L (ref 97–108)
CLARITY UR: CLEAR
CO2 SERPL-SCNC: 30 MMOL/L (ref 22–30)
COLOR UR: ABNORMAL
CREAT SERPL-MCNC: 0.73 MG/DL (ref 0.7–1.5)
EOSINOPHIL # BLD AUTO: 0.2 THOUSAND/ΜL (ref 0–0.4)
EOSINOPHIL NFR BLD AUTO: 3 % (ref 0–6)
ERYTHROCYTE [DISTWIDTH] IN BLOOD BY AUTOMATED COUNT: 13.6 %
GFR SERPL CREATININE-BSD FRML MDRD: 105 ML/MIN/1.73SQ M
GLUCOSE SERPL-MCNC: 113 MG/DL (ref 70–99)
GLUCOSE UR STRIP-MCNC: NEGATIVE MG/DL
HCT VFR BLD AUTO: 44.2 % (ref 41–53)
HGB BLD-MCNC: 15.1 G/DL (ref 13.5–17.5)
HGB UR QL STRIP.AUTO: NEGATIVE
KETONES UR STRIP-MCNC: NEGATIVE MG/DL
LEUKOCYTE ESTERASE UR QL STRIP: 25
LYMPHOCYTES # BLD AUTO: 1.8 THOUSANDS/ΜL (ref 0.5–4)
LYMPHOCYTES NFR BLD AUTO: 18 % (ref 25–45)
MCH RBC QN AUTO: 30.7 PG (ref 26–34)
MCHC RBC AUTO-ENTMCNC: 34.1 G/DL (ref 31–36)
MCV RBC AUTO: 90 FL (ref 80–100)
MONOCYTES # BLD AUTO: 0.9 THOUSAND/ΜL (ref 0.2–0.9)
MONOCYTES NFR BLD AUTO: 9 % (ref 1–10)
MUCOUS THREADS UR QL AUTO: ABNORMAL
NEUTROPHILS # BLD AUTO: 6.8 THOUSANDS/ΜL (ref 1.8–7.8)
NEUTS SEG NFR BLD AUTO: 70 % (ref 45–65)
NITRITE UR QL STRIP: NEGATIVE
NON-SQ EPI CELLS URNS QL MICRO: ABNORMAL /HPF
PH UR STRIP.AUTO: 7 [PH]
PLATELET # BLD AUTO: 267 THOUSANDS/UL (ref 150–450)
PMV BLD AUTO: 8.6 FL (ref 8.9–12.7)
POTASSIUM SERPL-SCNC: 4.4 MMOL/L (ref 3.6–5)
PROT UR STRIP-MCNC: NEGATIVE MG/DL
RBC # BLD AUTO: 4.91 MILLION/UL (ref 4.5–5.9)
RBC #/AREA URNS AUTO: ABNORMAL /HPF
SODIUM SERPL-SCNC: 139 MMOL/L (ref 137–147)
SP GR UR STRIP.AUTO: 1.01 (ref 1–1.04)
UROBILINOGEN UA: 4 MG/DL
WBC # BLD AUTO: 9.7 THOUSAND/UL (ref 4.5–11)
WBC #/AREA URNS AUTO: ABNORMAL /HPF

## 2020-11-03 PROCEDURE — 96365 THER/PROPH/DIAG IV INF INIT: CPT

## 2020-11-03 PROCEDURE — 81001 URINALYSIS AUTO W/SCOPE: CPT | Performed by: EMERGENCY MEDICINE

## 2020-11-03 PROCEDURE — 81003 URINALYSIS AUTO W/O SCOPE: CPT | Performed by: EMERGENCY MEDICINE

## 2020-11-03 PROCEDURE — 85025 COMPLETE CBC W/AUTO DIFF WBC: CPT | Performed by: EMERGENCY MEDICINE

## 2020-11-03 PROCEDURE — 99284 EMERGENCY DEPT VISIT MOD MDM: CPT | Performed by: EMERGENCY MEDICINE

## 2020-11-03 PROCEDURE — 87591 N.GONORRHOEAE DNA AMP PROB: CPT | Performed by: EMERGENCY MEDICINE

## 2020-11-03 PROCEDURE — 36415 COLL VENOUS BLD VENIPUNCTURE: CPT | Performed by: EMERGENCY MEDICINE

## 2020-11-03 PROCEDURE — 80048 BASIC METABOLIC PNL TOTAL CA: CPT | Performed by: EMERGENCY MEDICINE

## 2020-11-03 PROCEDURE — 87086 URINE CULTURE/COLONY COUNT: CPT | Performed by: EMERGENCY MEDICINE

## 2020-11-03 PROCEDURE — 76870 US EXAM SCROTUM: CPT

## 2020-11-03 PROCEDURE — 96361 HYDRATE IV INFUSION ADD-ON: CPT

## 2020-11-03 PROCEDURE — 99284 EMERGENCY DEPT VISIT MOD MDM: CPT

## 2020-11-03 PROCEDURE — 87491 CHLMYD TRACH DNA AMP PROBE: CPT | Performed by: EMERGENCY MEDICINE

## 2020-11-03 PROCEDURE — 96375 TX/PRO/DX INJ NEW DRUG ADDON: CPT

## 2020-11-03 RX ORDER — DOXYCYCLINE HYCLATE 100 MG/1
100 CAPSULE ORAL ONCE
Status: COMPLETED | OUTPATIENT
Start: 2020-11-03 | End: 2020-11-03

## 2020-11-03 RX ORDER — CEFTRIAXONE 1 G/50ML
1000 INJECTION, SOLUTION INTRAVENOUS ONCE
Status: COMPLETED | OUTPATIENT
Start: 2020-11-03 | End: 2020-11-03

## 2020-11-03 RX ORDER — DOXYCYCLINE HYCLATE 100 MG/1
100 CAPSULE ORAL 2 TIMES DAILY
Qty: 14 CAPSULE | Refills: 0 | Status: SHIPPED | OUTPATIENT
Start: 2020-11-03 | End: 2020-11-10

## 2020-11-03 RX ORDER — ACETAMINOPHEN 325 MG/1
650 TABLET ORAL ONCE
Status: COMPLETED | OUTPATIENT
Start: 2020-11-03 | End: 2020-11-03

## 2020-11-03 RX ORDER — NAPROXEN 500 MG/1
500 TABLET ORAL 2 TIMES DAILY WITH MEALS
Qty: 6 TABLET | Refills: 0 | Status: SHIPPED | OUTPATIENT
Start: 2020-11-03 | End: 2020-11-06

## 2020-11-03 RX ORDER — KETOROLAC TROMETHAMINE 30 MG/ML
30 INJECTION, SOLUTION INTRAMUSCULAR; INTRAVENOUS ONCE
Status: COMPLETED | OUTPATIENT
Start: 2020-11-03 | End: 2020-11-03

## 2020-11-03 RX ORDER — ALBUTEROL SULFATE 90 UG/1
2 AEROSOL, METERED RESPIRATORY (INHALATION) EVERY 6 HOURS PRN
COMMUNITY
Start: 2020-10-19 | End: 2021-10-19

## 2020-11-03 RX ADMIN — DOXYCYCLINE 100 MG: 100 CAPSULE ORAL at 11:31

## 2020-11-03 RX ADMIN — ACETAMINOPHEN 650 MG: 325 TABLET ORAL at 11:31

## 2020-11-03 RX ADMIN — KETOROLAC TROMETHAMINE 30 MG: 30 INJECTION, SOLUTION INTRAMUSCULAR; INTRAVENOUS at 11:30

## 2020-11-03 RX ADMIN — SODIUM CHLORIDE 1000 ML: 0.9 INJECTION, SOLUTION INTRAVENOUS at 11:31

## 2020-11-03 RX ADMIN — CEFTRIAXONE 1000 MG: 1 INJECTION, SOLUTION INTRAVENOUS at 11:33

## 2020-11-04 LAB — BACTERIA UR CULT: NORMAL

## 2020-11-05 LAB
C TRACH DNA SPEC QL NAA+PROBE: NEGATIVE
N GONORRHOEA DNA SPEC QL NAA+PROBE: NEGATIVE

## 2020-12-05 ENCOUNTER — HOSPITAL ENCOUNTER (EMERGENCY)
Facility: HOSPITAL | Age: 55
Discharge: HOME | End: 2020-12-05
Attending: EMERGENCY MEDICINE
Payer: COMMERCIAL

## 2020-12-05 VITALS
DIASTOLIC BLOOD PRESSURE: 90 MMHG | BODY MASS INDEX: 31.18 KG/M2 | HEART RATE: 104 BPM | OXYGEN SATURATION: 98 % | SYSTOLIC BLOOD PRESSURE: 164 MMHG | TEMPERATURE: 98.2 F | RESPIRATION RATE: 18 BRPM | WEIGHT: 165 LBS

## 2020-12-05 DIAGNOSIS — T50.901A ACCIDENTAL DRUG OVERDOSE, INITIAL ENCOUNTER: Primary | ICD-10-CM

## 2020-12-05 PROCEDURE — 99282 EMERGENCY DEPT VISIT SF MDM: CPT | Performed by: PHYSICIAN ASSISTANT

## 2020-12-05 PROCEDURE — 99284 EMERGENCY DEPT VISIT MOD MDM: CPT

## 2020-12-07 ENCOUNTER — HOSPITAL ENCOUNTER (EMERGENCY)
Facility: HOSPITAL | Age: 55
Discharge: HOME | End: 2020-12-07
Attending: EMERGENCY MEDICINE
Payer: COMMERCIAL

## 2020-12-07 VITALS
DIASTOLIC BLOOD PRESSURE: 93 MMHG | SYSTOLIC BLOOD PRESSURE: 157 MMHG | WEIGHT: 168.21 LBS | HEART RATE: 107 BPM | OXYGEN SATURATION: 97 % | RESPIRATION RATE: 18 BRPM | TEMPERATURE: 96.3 F | BODY MASS INDEX: 31.78 KG/M2

## 2020-12-07 DIAGNOSIS — T50.901A ACCIDENTAL OVERDOSE: Primary | ICD-10-CM

## 2020-12-07 PROCEDURE — 99282 EMERGENCY DEPT VISIT SF MDM: CPT | Performed by: EMERGENCY MEDICINE

## 2020-12-07 PROCEDURE — 99284 EMERGENCY DEPT VISIT MOD MDM: CPT

## 2020-12-07 RX ORDER — NALOXONE HYDROCHLORIDE 1 MG/ML
INJECTION INTRAMUSCULAR; INTRAVENOUS; SUBCUTANEOUS
Status: COMPLETED
Start: 2020-12-07 | End: 2020-12-07

## 2022-09-25 ENCOUNTER — HOSPITAL ENCOUNTER (INPATIENT)
Facility: HOSPITAL | Age: 57
LOS: 3 days | Discharge: HOME HEALTH | DRG: 231 | End: 2022-09-29
Attending: EMERGENCY MEDICINE | Admitting: SURGERY
Payer: COMMERCIAL

## 2022-09-25 ENCOUNTER — APPOINTMENT (OUTPATIENT)
Dept: RADIOLOGY | Facility: HOSPITAL | Age: 57
DRG: 231 | End: 2022-09-25
Payer: COMMERCIAL

## 2022-09-25 DIAGNOSIS — K63.1 PERFORATED BOWEL (HCC): ICD-10-CM

## 2022-09-25 DIAGNOSIS — K62.5 RECTAL BLEEDING: ICD-10-CM

## 2022-09-25 DIAGNOSIS — R10.9 ABDOMINAL PAIN: ICD-10-CM

## 2022-09-25 DIAGNOSIS — K63.1 PERFORATED RECTUM (HCC): Primary | ICD-10-CM

## 2022-09-25 LAB
ALBUMIN SERPL BCP-MCNC: 4.4 G/DL (ref 3.5–5)
ALP SERPL-CCNC: 49 U/L (ref 46–116)
ALT SERPL W P-5'-P-CCNC: 67 U/L (ref 12–78)
ANION GAP SERPL CALCULATED.3IONS-SCNC: 6 MMOL/L (ref 4–13)
AST SERPL W P-5'-P-CCNC: 47 U/L (ref 5–45)
ATRIAL RATE: 90 BPM
BASOPHILS # BLD AUTO: 0.02 THOUSANDS/ΜL (ref 0–0.1)
BASOPHILS NFR BLD AUTO: 1 % (ref 0–1)
BILIRUB SERPL-MCNC: 0.76 MG/DL (ref 0.2–1)
BUN SERPL-MCNC: 21 MG/DL (ref 5–25)
CALCIUM SERPL-MCNC: 9.6 MG/DL (ref 8.3–10.1)
CHLORIDE SERPL-SCNC: 105 MMOL/L (ref 96–108)
CO2 SERPL-SCNC: 31 MMOL/L (ref 21–32)
CREAT SERPL-MCNC: 1.04 MG/DL (ref 0.6–1.3)
EOSINOPHIL # BLD AUTO: 0.11 THOUSAND/ΜL (ref 0–0.61)
EOSINOPHIL NFR BLD AUTO: 3 % (ref 0–6)
ERYTHROCYTE [DISTWIDTH] IN BLOOD BY AUTOMATED COUNT: 12.4 % (ref 11.6–15.1)
GFR SERPL CREATININE-BSD FRML MDRD: 80 ML/MIN/1.73SQ M
GLUCOSE SERPL-MCNC: 101 MG/DL (ref 65–140)
HCT VFR BLD AUTO: 47.4 % (ref 36.5–49.3)
HGB BLD-MCNC: 16.1 G/DL (ref 12–17)
IMM GRANULOCYTES # BLD AUTO: 0.01 THOUSAND/UL (ref 0–0.2)
IMM GRANULOCYTES NFR BLD AUTO: 0 % (ref 0–2)
LYMPHOCYTES # BLD AUTO: 0.9 THOUSANDS/ΜL (ref 0.6–4.47)
LYMPHOCYTES NFR BLD AUTO: 27 % (ref 14–44)
MCH RBC QN AUTO: 33.5 PG (ref 26.8–34.3)
MCHC RBC AUTO-ENTMCNC: 34 G/DL (ref 31.4–37.4)
MCV RBC AUTO: 99 FL (ref 82–98)
MONOCYTES # BLD AUTO: 0.2 THOUSAND/ΜL (ref 0.17–1.22)
MONOCYTES NFR BLD AUTO: 6 % (ref 4–12)
NEUTROPHILS # BLD AUTO: 2.07 THOUSANDS/ΜL (ref 1.85–7.62)
NEUTS SEG NFR BLD AUTO: 63 % (ref 43–75)
NRBC BLD AUTO-RTO: 0 /100 WBCS
P AXIS: 86 DEGREES
PLATELET # BLD AUTO: 218 THOUSANDS/UL (ref 149–390)
PMV BLD AUTO: 10.9 FL (ref 8.9–12.7)
POTASSIUM SERPL-SCNC: 4.2 MMOL/L (ref 3.5–5.3)
PR INTERVAL: 134 MS
PROT SERPL-MCNC: 8 G/DL (ref 6.4–8.4)
QRS AXIS: 87 DEGREES
QRSD INTERVAL: 90 MS
QT INTERVAL: 328 MS
QTC INTERVAL: 401 MS
RBC # BLD AUTO: 4.81 MILLION/UL (ref 3.88–5.62)
SODIUM SERPL-SCNC: 142 MMOL/L (ref 135–147)
T WAVE AXIS: 67 DEGREES
VENTRICULAR RATE: 90 BPM
WBC # BLD AUTO: 3.31 THOUSAND/UL (ref 4.31–10.16)

## 2022-09-25 PROCEDURE — 99285 EMERGENCY DEPT VISIT HI MDM: CPT

## 2022-09-25 PROCEDURE — 93010 ELECTROCARDIOGRAM REPORT: CPT | Performed by: INTERNAL MEDICINE

## 2022-09-25 PROCEDURE — 93005 ELECTROCARDIOGRAM TRACING: CPT

## 2022-09-25 PROCEDURE — 80053 COMPREHEN METABOLIC PANEL: CPT

## 2022-09-25 PROCEDURE — 36415 COLL VENOUS BLD VENIPUNCTURE: CPT

## 2022-09-25 PROCEDURE — 36430 TRANSFUSION BLD/BLD COMPNT: CPT

## 2022-09-25 PROCEDURE — 85025 COMPLETE CBC W/AUTO DIFF WBC: CPT

## 2022-09-25 PROCEDURE — 99285 EMERGENCY DEPT VISIT HI MDM: CPT | Performed by: EMERGENCY MEDICINE

## 2022-09-25 RX ORDER — KETOROLAC TROMETHAMINE 30 MG/ML
15 INJECTION, SOLUTION INTRAMUSCULAR; INTRAVENOUS ONCE
Status: COMPLETED | OUTPATIENT
Start: 2022-09-25 | End: 2022-09-25

## 2022-09-25 RX ORDER — HYDROMORPHONE HCL/PF 1 MG/ML
1 SYRINGE (ML) INJECTION ONCE
Status: COMPLETED | OUTPATIENT
Start: 2022-09-25 | End: 2022-09-26

## 2022-09-25 RX ORDER — ONDANSETRON 2 MG/ML
4 INJECTION INTRAMUSCULAR; INTRAVENOUS ONCE
Status: COMPLETED | OUTPATIENT
Start: 2022-09-25 | End: 2022-09-25

## 2022-09-25 RX ADMIN — SODIUM CHLORIDE 1000 ML: 0.9 INJECTION, SOLUTION INTRAVENOUS at 23:33

## 2022-09-25 RX ADMIN — KETOROLAC TROMETHAMINE 15 MG: 30 INJECTION, SOLUTION INTRAMUSCULAR; INTRAVENOUS at 23:34

## 2022-09-25 RX ADMIN — ONDANSETRON 4 MG: 2 INJECTION INTRAMUSCULAR; INTRAVENOUS at 23:33

## 2022-09-26 ENCOUNTER — ANESTHESIA (INPATIENT)
Dept: PERIOP | Facility: HOSPITAL | Age: 57
DRG: 231 | End: 2022-09-26
Payer: COMMERCIAL

## 2022-09-26 ENCOUNTER — APPOINTMENT (EMERGENCY)
Dept: CT IMAGING | Facility: HOSPITAL | Age: 57
DRG: 231 | End: 2022-09-26
Payer: COMMERCIAL

## 2022-09-26 ENCOUNTER — APPOINTMENT (OUTPATIENT)
Dept: RADIOLOGY | Facility: HOSPITAL | Age: 57
DRG: 231 | End: 2022-09-26
Payer: COMMERCIAL

## 2022-09-26 ENCOUNTER — ANESTHESIA EVENT (INPATIENT)
Dept: PERIOP | Facility: HOSPITAL | Age: 57
DRG: 231 | End: 2022-09-26
Payer: COMMERCIAL

## 2022-09-26 PROBLEM — K63.1 PERFORATED RECTUM (HCC): Status: ACTIVE | Noted: 2022-09-26

## 2022-09-26 PROBLEM — K63.1 PERFORATED BOWEL (HCC): Status: ACTIVE | Noted: 2022-09-26

## 2022-09-26 LAB
ABO GROUP BLD BPU: NORMAL
ABO GROUP BLD: NORMAL
ABO GROUP BLD: NORMAL
ALBUMIN SERPL BCP-MCNC: 3.6 G/DL (ref 3.5–5)
ALP SERPL-CCNC: 30 U/L (ref 46–116)
ALT SERPL W P-5'-P-CCNC: 44 U/L (ref 12–78)
ANION GAP SERPL CALCULATED.3IONS-SCNC: 11 MMOL/L (ref 4–13)
APTT PPP: 26 SECONDS (ref 23–37)
AST SERPL W P-5'-P-CCNC: 30 U/L (ref 5–45)
BILIRUB SERPL-MCNC: 0.95 MG/DL (ref 0.2–1)
BLD GP AB SCN SERPL QL: NEGATIVE
BPU ID: NORMAL
BUN SERPL-MCNC: 17 MG/DL (ref 5–25)
CALCIUM SERPL-MCNC: 7.8 MG/DL (ref 8.3–10.1)
CHLORIDE SERPL-SCNC: 110 MMOL/L (ref 96–108)
CO2 SERPL-SCNC: 22 MMOL/L (ref 21–32)
CREAT SERPL-MCNC: 0.91 MG/DL (ref 0.6–1.3)
ERYTHROCYTE [DISTWIDTH] IN BLOOD BY AUTOMATED COUNT: 12.4 % (ref 11.6–15.1)
GFR SERPL CREATININE-BSD FRML MDRD: 94 ML/MIN/1.73SQ M
GLUCOSE SERPL-MCNC: 129 MG/DL (ref 65–140)
HCT VFR BLD AUTO: 37.7 % (ref 36.5–49.3)
HGB BLD-MCNC: 12.6 G/DL (ref 12–17)
INR PPP: 1.33 (ref 0.84–1.19)
MAGNESIUM SERPL-MCNC: 1.4 MG/DL (ref 1.6–2.6)
MCH RBC QN AUTO: 33.9 PG (ref 26.8–34.3)
MCHC RBC AUTO-ENTMCNC: 33.4 G/DL (ref 31.4–37.4)
MCV RBC AUTO: 101 FL (ref 82–98)
PHOSPHATE SERPL-MCNC: 3.1 MG/DL (ref 2.7–4.5)
PLATELET # BLD AUTO: 146 THOUSANDS/UL (ref 149–390)
PLATELET # BLD AUTO: 160 THOUSANDS/UL (ref 149–390)
PMV BLD AUTO: 10.7 FL (ref 8.9–12.7)
PMV BLD AUTO: 11.4 FL (ref 8.9–12.7)
POTASSIUM SERPL-SCNC: 4.5 MMOL/L (ref 3.5–5.3)
PROT SERPL-MCNC: 6.3 G/DL (ref 6.4–8.4)
PROTHROMBIN TIME: 16.5 SECONDS (ref 11.6–14.5)
RBC # BLD AUTO: 3.72 MILLION/UL (ref 3.88–5.62)
RH BLD: POSITIVE
RH BLD: POSITIVE
SODIUM SERPL-SCNC: 143 MMOL/L (ref 135–147)
SPECIMEN EXPIRATION DATE: NORMAL
UNIT DISPENSE STATUS: NORMAL
UNIT PRODUCT CODE: NORMAL
UNIT PRODUCT VOLUME: 200 ML
UNIT RH: NORMAL
WBC # BLD AUTO: 8.7 THOUSAND/UL (ref 4.31–10.16)

## 2022-09-26 PROCEDURE — 88304 TISSUE EXAM BY PATHOLOGIST: CPT | Performed by: PATHOLOGY

## 2022-09-26 PROCEDURE — 36415 COLL VENOUS BLD VENIPUNCTURE: CPT

## 2022-09-26 PROCEDURE — 85027 COMPLETE CBC AUTOMATED: CPT

## 2022-09-26 PROCEDURE — 86850 RBC ANTIBODY SCREEN: CPT

## 2022-09-26 PROCEDURE — 0DTP0ZZ RESECTION OF RECTUM, OPEN APPROACH: ICD-10-PCS | Performed by: SURGERY

## 2022-09-26 PROCEDURE — 86920 COMPATIBILITY TEST SPIN: CPT

## 2022-09-26 PROCEDURE — 74018 RADEX ABDOMEN 1 VIEW: CPT

## 2022-09-26 PROCEDURE — 87205 SMEAR GRAM STAIN: CPT | Performed by: SURGERY

## 2022-09-26 PROCEDURE — 87186 SC STD MICRODIL/AGAR DIL: CPT | Performed by: SURGERY

## 2022-09-26 PROCEDURE — 87070 CULTURE OTHR SPECIMN AEROBIC: CPT | Performed by: SURGERY

## 2022-09-26 PROCEDURE — 0D1N0Z4 BYPASS SIGMOID COLON TO CUTANEOUS, OPEN APPROACH: ICD-10-PCS | Performed by: SURGERY

## 2022-09-26 PROCEDURE — 84100 ASSAY OF PHOSPHORUS: CPT

## 2022-09-26 PROCEDURE — 85610 PROTHROMBIN TIME: CPT

## 2022-09-26 PROCEDURE — 80053 COMPREHEN METABOLIC PANEL: CPT

## 2022-09-26 PROCEDURE — 44146 PARTIAL REMOVAL OF COLON: CPT | Performed by: SURGERY

## 2022-09-26 PROCEDURE — 86901 BLOOD TYPING SEROLOGIC RH(D): CPT

## 2022-09-26 PROCEDURE — 30233K1 TRANSFUSION OF NONAUTOLOGOUS FROZEN PLASMA INTO PERIPHERAL VEIN, PERCUTANEOUS APPROACH: ICD-10-PCS | Performed by: SURGERY

## 2022-09-26 PROCEDURE — 88307 TISSUE EXAM BY PATHOLOGIST: CPT | Performed by: PATHOLOGY

## 2022-09-26 PROCEDURE — 74177 CT ABD & PELVIS W/CONTRAST: CPT

## 2022-09-26 PROCEDURE — 0DJD8ZZ INSPECTION OF LOWER INTESTINAL TRACT, VIA NATURAL OR ARTIFICIAL OPENING ENDOSCOPIC: ICD-10-PCS | Performed by: SURGERY

## 2022-09-26 PROCEDURE — 85049 AUTOMATED PLATELET COUNT: CPT

## 2022-09-26 PROCEDURE — P9017 PLASMA 1 DONOR FRZ W/IN 8 HR: HCPCS

## 2022-09-26 PROCEDURE — 86900 BLOOD TYPING SEROLOGIC ABO: CPT

## 2022-09-26 PROCEDURE — 99223 1ST HOSP IP/OBS HIGH 75: CPT | Performed by: SURGERY

## 2022-09-26 PROCEDURE — 85730 THROMBOPLASTIN TIME PARTIAL: CPT

## 2022-09-26 PROCEDURE — 87077 CULTURE AEROBIC IDENTIFY: CPT | Performed by: SURGERY

## 2022-09-26 PROCEDURE — G1004 CDSM NDSC: HCPCS

## 2022-09-26 PROCEDURE — 83735 ASSAY OF MAGNESIUM: CPT

## 2022-09-26 PROCEDURE — 87075 CULTR BACTERIA EXCEPT BLOOD: CPT | Performed by: SURGERY

## 2022-09-26 RX ORDER — METRONIDAZOLE 500 MG/100ML
500 INJECTION, SOLUTION INTRAVENOUS EVERY 8 HOURS
Status: DISCONTINUED | OUTPATIENT
Start: 2022-09-26 | End: 2022-09-29 | Stop reason: HOSPADM

## 2022-09-26 RX ORDER — FENTANYL CITRATE 50 UG/ML
INJECTION, SOLUTION INTRAMUSCULAR; INTRAVENOUS AS NEEDED
Status: DISCONTINUED | OUTPATIENT
Start: 2022-09-26 | End: 2022-09-26

## 2022-09-26 RX ORDER — ONDANSETRON 2 MG/ML
4 INJECTION INTRAMUSCULAR; INTRAVENOUS EVERY 6 HOURS PRN
Status: DISCONTINUED | OUTPATIENT
Start: 2022-09-26 | End: 2022-09-29 | Stop reason: HOSPADM

## 2022-09-26 RX ORDER — DEXAMETHASONE SODIUM PHOSPHATE 10 MG/ML
INJECTION, SOLUTION INTRAMUSCULAR; INTRAVENOUS AS NEEDED
Status: DISCONTINUED | OUTPATIENT
Start: 2022-09-26 | End: 2022-09-26

## 2022-09-26 RX ORDER — ROCURONIUM BROMIDE 10 MG/ML
INJECTION, SOLUTION INTRAVENOUS AS NEEDED
Status: DISCONTINUED | OUTPATIENT
Start: 2022-09-26 | End: 2022-09-26

## 2022-09-26 RX ORDER — GABAPENTIN 100 MG/1
100 CAPSULE ORAL 3 TIMES DAILY
Status: DISCONTINUED | OUTPATIENT
Start: 2022-09-26 | End: 2022-09-29 | Stop reason: HOSPADM

## 2022-09-26 RX ORDER — LIDOCAINE 50 MG/G
2 PATCH TOPICAL DAILY
Status: DISCONTINUED | OUTPATIENT
Start: 2022-09-27 | End: 2022-09-29 | Stop reason: HOSPADM

## 2022-09-26 RX ORDER — ACETAMINOPHEN 325 MG/1
650 TABLET ORAL EVERY 6 HOURS PRN
Status: DISCONTINUED | OUTPATIENT
Start: 2022-09-26 | End: 2022-09-26

## 2022-09-26 RX ORDER — SODIUM CHLORIDE, SODIUM LACTATE, POTASSIUM CHLORIDE, CALCIUM CHLORIDE 600; 310; 30; 20 MG/100ML; MG/100ML; MG/100ML; MG/100ML
125 INJECTION, SOLUTION INTRAVENOUS CONTINUOUS
Status: DISCONTINUED | OUTPATIENT
Start: 2022-09-26 | End: 2022-09-28

## 2022-09-26 RX ORDER — SODIUM CHLORIDE, SODIUM LACTATE, POTASSIUM CHLORIDE, CALCIUM CHLORIDE 600; 310; 30; 20 MG/100ML; MG/100ML; MG/100ML; MG/100ML
INJECTION, SOLUTION INTRAVENOUS CONTINUOUS PRN
Status: DISCONTINUED | OUTPATIENT
Start: 2022-09-26 | End: 2022-09-26

## 2022-09-26 RX ORDER — PROPOFOL 10 MG/ML
INJECTION, EMULSION INTRAVENOUS AS NEEDED
Status: DISCONTINUED | OUTPATIENT
Start: 2022-09-26 | End: 2022-09-26

## 2022-09-26 RX ORDER — OXYCODONE HYDROCHLORIDE 5 MG/1
5 TABLET ORAL EVERY 4 HOURS PRN
Status: DISCONTINUED | OUTPATIENT
Start: 2022-09-26 | End: 2022-09-29 | Stop reason: HOSPADM

## 2022-09-26 RX ORDER — MAGNESIUM HYDROXIDE 1200 MG/15ML
LIQUID ORAL AS NEEDED
Status: DISCONTINUED | OUTPATIENT
Start: 2022-09-26 | End: 2022-09-26 | Stop reason: HOSPADM

## 2022-09-26 RX ORDER — ONDANSETRON 2 MG/ML
4 INJECTION INTRAMUSCULAR; INTRAVENOUS ONCE AS NEEDED
Status: DISCONTINUED | OUTPATIENT
Start: 2022-09-26 | End: 2022-09-26 | Stop reason: HOSPADM

## 2022-09-26 RX ORDER — ALBUMIN, HUMAN INJ 5% 5 %
SOLUTION INTRAVENOUS CONTINUOUS PRN
Status: DISCONTINUED | OUTPATIENT
Start: 2022-09-26 | End: 2022-09-26

## 2022-09-26 RX ORDER — FENTANYL CITRATE/PF 50 MCG/ML
25 SYRINGE (ML) INJECTION
Status: DISCONTINUED | OUTPATIENT
Start: 2022-09-26 | End: 2022-09-26 | Stop reason: HOSPADM

## 2022-09-26 RX ORDER — ACETAMINOPHEN 325 MG/1
650 TABLET ORAL EVERY 6 HOURS
Status: DISCONTINUED | OUTPATIENT
Start: 2022-09-26 | End: 2022-09-29 | Stop reason: HOSPADM

## 2022-09-26 RX ORDER — METHOCARBAMOL 500 MG/1
500 TABLET, FILM COATED ORAL EVERY 6 HOURS SCHEDULED
Status: DISCONTINUED | OUTPATIENT
Start: 2022-09-26 | End: 2022-09-29 | Stop reason: HOSPADM

## 2022-09-26 RX ORDER — OXYCODONE HYDROCHLORIDE 10 MG/1
10 TABLET ORAL EVERY 4 HOURS PRN
Status: DISCONTINUED | OUTPATIENT
Start: 2022-09-26 | End: 2022-09-29 | Stop reason: HOSPADM

## 2022-09-26 RX ORDER — HYDROMORPHONE HCL 110MG/55ML
PATIENT CONTROLLED ANALGESIA SYRINGE INTRAVENOUS AS NEEDED
Status: DISCONTINUED | OUTPATIENT
Start: 2022-09-26 | End: 2022-09-26

## 2022-09-26 RX ORDER — CEFAZOLIN SODIUM 1 G/3ML
INJECTION, POWDER, FOR SOLUTION INTRAMUSCULAR; INTRAVENOUS AS NEEDED
Status: DISCONTINUED | OUTPATIENT
Start: 2022-09-26 | End: 2022-09-26

## 2022-09-26 RX ORDER — MAGNESIUM SULFATE HEPTAHYDRATE 40 MG/ML
2 INJECTION, SOLUTION INTRAVENOUS ONCE
Status: COMPLETED | OUTPATIENT
Start: 2022-09-26 | End: 2022-09-26

## 2022-09-26 RX ORDER — MIDAZOLAM HYDROCHLORIDE 2 MG/2ML
INJECTION, SOLUTION INTRAMUSCULAR; INTRAVENOUS AS NEEDED
Status: DISCONTINUED | OUTPATIENT
Start: 2022-09-26 | End: 2022-09-26

## 2022-09-26 RX ORDER — CEFAZOLIN SODIUM 2 G/50ML
2000 SOLUTION INTRAVENOUS EVERY 8 HOURS
Status: DISCONTINUED | OUTPATIENT
Start: 2022-09-26 | End: 2022-09-29 | Stop reason: HOSPADM

## 2022-09-26 RX ORDER — GLYCOPYRROLATE 0.2 MG/ML
INJECTION INTRAMUSCULAR; INTRAVENOUS AS NEEDED
Status: DISCONTINUED | OUTPATIENT
Start: 2022-09-26 | End: 2022-09-26

## 2022-09-26 RX ORDER — HYDROMORPHONE HCL/PF 1 MG/ML
0.5 SYRINGE (ML) INJECTION
Status: DISCONTINUED | OUTPATIENT
Start: 2022-09-26 | End: 2022-09-29 | Stop reason: HOSPADM

## 2022-09-26 RX ORDER — LIDOCAINE HYDROCHLORIDE 10 MG/ML
INJECTION, SOLUTION EPIDURAL; INFILTRATION; INTRACAUDAL; PERINEURAL AS NEEDED
Status: DISCONTINUED | OUTPATIENT
Start: 2022-09-26 | End: 2022-09-26

## 2022-09-26 RX ORDER — NEOSTIGMINE METHYLSULFATE 1 MG/ML
INJECTION INTRAVENOUS AS NEEDED
Status: DISCONTINUED | OUTPATIENT
Start: 2022-09-26 | End: 2022-09-26

## 2022-09-26 RX ORDER — ENOXAPARIN SODIUM 100 MG/ML
40 INJECTION SUBCUTANEOUS DAILY
Status: DISCONTINUED | OUTPATIENT
Start: 2022-09-26 | End: 2022-09-29 | Stop reason: HOSPADM

## 2022-09-26 RX ORDER — SODIUM CHLORIDE 9 MG/ML
INJECTION, SOLUTION INTRAVENOUS CONTINUOUS PRN
Status: DISCONTINUED | OUTPATIENT
Start: 2022-09-26 | End: 2022-09-26

## 2022-09-26 RX ADMIN — SODIUM CHLORIDE, SODIUM LACTATE, POTASSIUM CHLORIDE, AND CALCIUM CHLORIDE 125 ML/HR: .6; .31; .03; .02 INJECTION, SOLUTION INTRAVENOUS at 09:06

## 2022-09-26 RX ADMIN — GABAPENTIN 100 MG: 100 CAPSULE ORAL at 16:38

## 2022-09-26 RX ADMIN — ACETAMINOPHEN 650 MG: 325 TABLET ORAL at 21:33

## 2022-09-26 RX ADMIN — METRONIDAZOLE 500 MG: 500 INJECTION, SOLUTION INTRAVENOUS at 10:14

## 2022-09-26 RX ADMIN — FENTANYL CITRATE 100 MCG: 50 INJECTION INTRAMUSCULAR; INTRAVENOUS at 03:39

## 2022-09-26 RX ADMIN — ENOXAPARIN SODIUM 40 MG: 40 INJECTION SUBCUTANEOUS at 10:10

## 2022-09-26 RX ADMIN — METRONIDAZOLE 500 MG: 500 INJECTION, SOLUTION INTRAVENOUS at 17:16

## 2022-09-26 RX ADMIN — HYDROMORPHONE HYDROCHLORIDE 0.5 MG: 1 INJECTION, SOLUTION INTRAMUSCULAR; INTRAVENOUS; SUBCUTANEOUS at 06:44

## 2022-09-26 RX ADMIN — CEFAZOLIN SODIUM 2000 MG: 2 SOLUTION INTRAVENOUS at 17:16

## 2022-09-26 RX ADMIN — SODIUM CHLORIDE, SODIUM LACTATE, POTASSIUM CHLORIDE, AND CALCIUM CHLORIDE: .6; .31; .03; .02 INJECTION, SOLUTION INTRAVENOUS at 03:33

## 2022-09-26 RX ADMIN — FENTANYL CITRATE 50 MCG: 50 INJECTION INTRAMUSCULAR; INTRAVENOUS at 05:40

## 2022-09-26 RX ADMIN — SODIUM CHLORIDE, SODIUM LACTATE, POTASSIUM CHLORIDE, AND CALCIUM CHLORIDE 1000 ML: .6; .31; .03; .02 INJECTION, SOLUTION INTRAVENOUS at 02:17

## 2022-09-26 RX ADMIN — LIDOCAINE HYDROCHLORIDE 100 MG: 10 INJECTION, SOLUTION EPIDURAL; INFILTRATION; INTRACAUDAL; PERINEURAL at 03:39

## 2022-09-26 RX ADMIN — HYDROMORPHONE HYDROCHLORIDE 1 MG: 1 INJECTION, SOLUTION INTRAMUSCULAR; INTRAVENOUS; SUBCUTANEOUS at 04:12

## 2022-09-26 RX ADMIN — SODIUM CHLORIDE: 0.9 INJECTION, SOLUTION INTRAVENOUS at 03:37

## 2022-09-26 RX ADMIN — ALBUMIN (HUMAN): 12.5 INJECTION, SOLUTION INTRAVENOUS at 03:55

## 2022-09-26 RX ADMIN — DEXAMETHASONE SODIUM PHOSPHATE 10 MG: 10 INJECTION, SOLUTION INTRAMUSCULAR; INTRAVENOUS at 04:19

## 2022-09-26 RX ADMIN — PROPOFOL 200 MG: 10 INJECTION, EMULSION INTRAVENOUS at 03:39

## 2022-09-26 RX ADMIN — ROCURONIUM BROMIDE 50 MG: 50 INJECTION, SOLUTION INTRAVENOUS at 03:39

## 2022-09-26 RX ADMIN — OXYCODONE 5 MG: 5 TABLET ORAL at 16:39

## 2022-09-26 RX ADMIN — HYDROMORPHONE HYDROCHLORIDE 1 MG: 2 INJECTION INTRAMUSCULAR; INTRAVENOUS; SUBCUTANEOUS at 04:12

## 2022-09-26 RX ADMIN — SODIUM CHLORIDE, SODIUM LACTATE, POTASSIUM CHLORIDE, AND CALCIUM CHLORIDE 125 ML/HR: .6; .31; .03; .02 INJECTION, SOLUTION INTRAVENOUS at 17:16

## 2022-09-26 RX ADMIN — METRONIDAZOLE: 500 INJECTION, SOLUTION INTRAVENOUS at 03:48

## 2022-09-26 RX ADMIN — FENTANYL CITRATE 50 MCG: 50 INJECTION INTRAMUSCULAR; INTRAVENOUS at 06:24

## 2022-09-26 RX ADMIN — MIDAZOLAM 2 MG: 1 INJECTION INTRAMUSCULAR; INTRAVENOUS at 03:37

## 2022-09-26 RX ADMIN — CEFAZOLIN SODIUM 2000 MG: 2 SOLUTION INTRAVENOUS at 10:10

## 2022-09-26 RX ADMIN — CEFAZOLIN SODIUM 2000 MG: 1 INJECTION, POWDER, FOR SOLUTION INTRAMUSCULAR; INTRAVENOUS at 03:47

## 2022-09-26 RX ADMIN — METHOCARBAMOL 500 MG: 500 TABLET ORAL at 23:03

## 2022-09-26 RX ADMIN — GLYCOPYRROLATE 0.6 MG: 0.2 INJECTION, SOLUTION INTRAMUSCULAR; INTRAVENOUS at 06:11

## 2022-09-26 RX ADMIN — ROCURONIUM BROMIDE 20 MG: 50 INJECTION, SOLUTION INTRAVENOUS at 04:32

## 2022-09-26 RX ADMIN — MAGNESIUM SULFATE HEPTAHYDRATE 2 G: 40 INJECTION, SOLUTION INTRAVENOUS at 10:49

## 2022-09-26 RX ADMIN — GABAPENTIN 100 MG: 100 CAPSULE ORAL at 21:33

## 2022-09-26 RX ADMIN — NEOSTIGMINE METHYLSULFATE 3 MG: 1 INJECTION INTRAVENOUS at 06:11

## 2022-09-26 RX ADMIN — ONDANSETRON 4 MG: 2 INJECTION INTRAMUSCULAR; INTRAVENOUS at 05:39

## 2022-09-26 RX ADMIN — ACETAMINOPHEN 650 MG: 325 TABLET ORAL at 16:38

## 2022-09-26 RX ADMIN — SODIUM CHLORIDE: 0.9 INJECTION, SOLUTION INTRAVENOUS at 05:38

## 2022-09-26 RX ADMIN — SODIUM CHLORIDE, SODIUM LACTATE, POTASSIUM CHLORIDE, AND CALCIUM CHLORIDE: .6; .31; .03; .02 INJECTION, SOLUTION INTRAVENOUS at 05:41

## 2022-09-26 RX ADMIN — IOHEXOL 100 ML: 350 INJECTION, SOLUTION INTRAVENOUS at 00:57

## 2022-09-26 RX ADMIN — SODIUM CHLORIDE, SODIUM LACTATE, POTASSIUM CHLORIDE, AND CALCIUM CHLORIDE: .6; .31; .03; .02 INJECTION, SOLUTION INTRAVENOUS at 04:09

## 2022-09-26 RX ADMIN — METHOCARBAMOL 500 MG: 500 TABLET ORAL at 17:04

## 2022-09-26 RX ADMIN — ALBUMIN (HUMAN): 12.5 INJECTION, SOLUTION INTRAVENOUS at 04:10

## 2022-09-26 RX ADMIN — ROCURONIUM BROMIDE 10 MG: 50 INJECTION, SOLUTION INTRAVENOUS at 05:37

## 2022-09-26 RX ADMIN — SODIUM CHLORIDE: 0.9 INJECTION, SOLUTION INTRAVENOUS at 04:28

## 2022-09-26 NOTE — ED ATTENDING ATTESTATION
9/25/2022  IWaylon, DO, saw and evaluated the patient  I have discussed the patient with the resident/non-physician practitioner and agree with the resident's/non-physician practitioner's findings, Plan of Care, and MDM as documented in the resident's/non-physician practitioner's note, except where noted  All available labs and Radiology studies were reviewed  I was present for key portions of any procedure(s) performed by the resident/non-physician practitioner and I was immediately available to provide assistance  At this point I agree with the current assessment done in the Emergency Department  I have conducted an independent evaluation of this patient a history and physical is as follows:    52-year-old male presents for severe abdominal pain and rectal bleeding that started at around 10:30 a m  This evening  Patient states that he was using a dildo and pushed too hard  Patient states that he normally tolerates 10 in but used a 14 in still though tonight  Patient immediately felt a pop, had severe pain and then had a bloody bowel movement with bright red blood  Patient states that since that time the pain is now getting worse and is now diffuse over the entire abdomen with nausea but no vomiting  Patient had another bloody bowel movement while waiting in the waiting room  He started to feel cold and chills  Patient is slightly tachycardic but otherwise has normal vital signs  He appears uncomfortable on exam   His abdomen is distended with diffuse tenderness, involuntary guarding and rigidity  Surgery immediately consulted for high suspicion for bowel perforation  Will obtain an x-ray of the abdomen, CT, labs for probable surgery, control pain with IV medications, IV fluids and blood products as needed  Surgery immediately evaluated agrees with high suspicion for bowel perforation    They will speak to the attending for potential operative management here verses transfer to [facility] for colorectal surgery  CT confirms perforation  General surgery will take the patient immediately to the OR from the ER  Patient has remained stable throughout this time  Patient refusing pain medications because he is an ex addict  Vital signs remained stable  ED Course         Critical Care Time  CriticalCare Time  Performed by: Anita Landrum DO  Authorized by: Anita Landrum DO     Critical care provider statement:     Critical care time (minutes):  60    Critical care time was exclusive of:  Separately billable procedures and treating other patients and teaching time    Critical care was necessary to treat or prevent imminent or life-threatening deterioration of the following conditions: Bowel perforation requiring emergent operative management      Critical care was time spent personally by me on the following activities:  Blood draw for specimens, obtaining history from patient or surrogate, development of treatment plan with patient or surrogate, discussions with consultants, evaluation of patient's response to treatment, examination of patient, interpretation of cardiac output measurements, ordering and performing treatments and interventions, ordering and review of laboratory studies, ordering and review of radiographic studies, review of old charts and re-evaluation of patient's condition    I assumed direction of critical care for this patient from another provider in my specialty: no

## 2022-09-26 NOTE — H&P
H&P:  General surgery  Ema Summers 55 y o  male MRN: [id number]  Unit/Bed#: ED 22 Encounter: [id number]         Assessment/Plan     Assessment:  Patient is a 55 y o  male with pmhx of asthma, hep C, opiate use (1 year clean) who presented with acute onset rectal pain and bleeding after using an anal dildo  Given concerning abdominal exam general surgery was consulted for peritonitis and colonic perforation  Afebrile, VSS, no tachycardia or hypotension  Labs and imaging pending    Plan:  -Admit to general surgery  -NPO/IVF  -given peritonitis on exam will need operative intervention but given stability will obtain imaging  -upright abdominal x-ray followed by CT abdomen pelvis if no free air is present on plain film  -pain and nausea control as needed  -CBC, BMP, PT/INR, type and screen  -2 units on hold    History of Present Illness     HPI:  Ema Summers is a 55 y o  male ith pmhx of asthma, hep C, opiate use (1 year clean) who presented with acute onset rectal pain and bleeding after using an anal dildo  He reports this is the regular practice for him but that he usually only uses a 10 in object  He reports that today was his 1st time using a larger objects which measures 14 in in length  He says that he was using it with no issue until it slipped and he felt a pop followed by acute-onset rectal pain  He reports that afterwards he did have rectal bleeding which stopped but that the pain was progressively worsening and began to involve his pelvis and abdomen  During this time he reports chills and nausea but denies fevers, vomiting, chest pain, shortness a breath  He denies taking any anticoagulant or anti-platelet medication  He reports he last ate at 1:00 p m  On 09/25  Since this incident he has not passed flatus or had a bowel movement  He reports that he is an occasional smoker but that he does not drink  Denies any previous abdominal surgeries      Review of Systems   As above, otherwise negative  Consult to surgery general  Consult performed by: Hebert Chavez MD  Consult ordered by: Hoang Dwyer DO          Historical Information   Past Medical History:   Diagnosis Date    Asthma     Bronchitis     COPD (chronic obstructive pulmonary disease) ([facility] [address] )     Hepatitis C     Pneumonia      Past Surgical History:   Procedure Laterality Date    MOUTH SURGERY       Social History   Social History     Substance and Sexual Activity   Alcohol Use Not Currently     Social History     Substance and Sexual Activity   Drug Use Yes    Types: Marijuana, Heroin, Methamphetamines, Fentanyl     Social History     Tobacco Use   Smoking Status Current Every Day Smoker    Packs/day: 0 25    Types: Cigarettes   Smokeless Tobacco Never Used     Family History: non-contributory    Meds/Allergies   all medications and allergies reviewed  Allergies   Allergen Reactions    Bactrim [Sulfamethoxazole-Trimethoprim]     Ciprofloxacin     Dust Mite Extract Hives    Other Hives    Sulfa Antibiotics Hives       Objective   First Vitals:   Blood Pressure: 129/81 (09/25/22 2133)  Pulse: 81 (09/25/22 2133)  Temperature: 98 1 °F (36 7 °C) (09/25/22 2132)  Respirations: 16 (09/25/22 2133)  Weight - Scale: 81 7 kg (180 lb 1 9 oz) (09/25/22 2132)  SpO2: 91 % (09/25/22 2135)    Current Vitals:   Blood Pressure: 129/81 (09/25/22 2133)  Pulse: 81 (09/25/22 2133)  Temperature: 98 1 °F (36 7 °C) (09/25/22 2132)  Respirations: 16 (09/25/22 2133)  Weight - Scale: 81 7 kg (180 lb 1 9 oz) (09/25/22 2132)  SpO2: 91 % (09/25/22 2135)    No intake or output data in the 24 hours ending 09/25/22 2352    Invasive Devices  Report    Peripheral Intravenous Line  Duration           Peripheral IV 09/25/22 Right Antecubital <1 day                Physical Exam:  General:  Acutely distressed and tremulous  CV: Well perfused, regular rate and rhythm  Lungs: Normal work of breathing, no increased respiratory effort  Abdomen: Peritonitis with Exquisite tenderness diffusely, rebound and guarding  The abdomen is soft  No active rectal  Bleeding; BENIGNO deferred  Extremities: No edema, clubbing or cyanosis  Skin: Warm, dry    Lab Results: I have personally reviewed pertinent lab results  Imaging: I have personally reviewed pertinent reports  EKG, Pathology, and Other Studies: I have personally reviewed pertinent reports        Code Status: No Order  Advance Directive and Living Will:      Power of :    POLST:

## 2022-09-26 NOTE — PROGRESS NOTES
Progress Note - General Surgery   Kevin Martin 55 y o  male MRN: [id number]  Unit/Bed#: E4 -01 Encounter: [id number]    Assessment:  Patient is a 55 y o  male w/ PMH of asthma, HCV, opiate abuse, w/ rectosigmoid perforation due to foreign body s/p ex lap, ESTUARDO, sigmoidectomy w/ loop colostomy 9/26     Ostomy:  Minimal Bowel sweat    Plan:   NPO, clears today   IVF   Keep Saleem for now   ABX   OOB ambulate   I/Os   Please TigerText on call [facility] surgery resident or AP with any questions     Subjective/Objective     Subjective:   No acute events overnight  Pain controlled  No nausea or vomiting  Feels a little bloated  Feels hungry; Wants to eat  Has been out of bed  Pertinent review of systems as above  All other review of systems negative  Objective:    Blood pressure 91/64, pulse 71, temperature 99 3 °F (37 4 °C), temperature source Temporal, resp  rate 18, weight 81 7 kg (180 lb 1 9 oz), SpO2 90 %  ,Body mass index is 34 03 kg/m²  Intake/Output Summary (Last 24 hours) at 9/27/2022 0511  Last data filed at 9/27/2022 0247  Gross per 24 hour   Intake 4863 33 ml   Output 3160 ml   Net 1703 33 ml       Invasive Devices  Report    Peripheral Intravenous Line  Duration           Peripheral IV 09/25/22 Right Antecubital 1 day    Peripheral IV 09/26/22 Left Forearm 1 day          Drain  Duration           Urethral Catheter Non-latex 16 Fr  1 day    Colostomy Loop LLQ <1 day                Physical Exam:   Gen:  NAD  HEENT: NCAT  MMM  CV: well perfused  Lungs: Normal respiratory effort  Abd: soft, nt/nd,incisions CDI, ostomy in place,   Skin: warm/ dry  Extremities: no peripheral edema, no clubbing or cyanosis  Neuro:  AxO x3      Results from last 7 days   Lab Units 09/26/22  0749 09/26/22  0212 09/25/22  2333   WBC Thousand/uL 8 70  --  3 31*   HEMOGLOBIN g/dL 12 6  --  16 1   HEMATOCRIT % 37 7  --  47 4   PLATELETS Thousands/uL 146* 160 218     Results from last 7 days   Lab Units 09/26/22  0749 09/25/22  2333   POTASSIUM mmol/L 4 5 4 2   CHLORIDE mmol/L 110* 105   CO2 mmol/L 22 31   BUN mg/dL 17 21   CREATININE mg/dL 0 91 1 04   CALCIUM mg/dL 7 8* 9 6     Results from last 7 days   Lab Units 09/26/22  0015   INR  1 33*   PTT seconds 26        I have personally reviewed pertinent films in PACS      Medications:   Scheduled Meds:  Current Facility-Administered Medications   Medication Dose Route Frequency Provider Last Rate    acetaminophen  650 mg Oral Q6H Afshin Deal MD      cefazolin  2,000 mg Intravenous Q8H Afshin Deal MD Stopped (09/27/22 0207)    enoxaparin  40 mg Subcutaneous Daily Afshin Deal MD      gabapentin  100 mg Oral TID Afshin Deal MD      HYDROmorphone  0 5 mg Intravenous Q3H PRN Afshin Deal MD      lactated ringers  125 mL/hr Intravenous Continuous Afshin Deal  mL/hr (09/27/22 0248)    lidocaine  2 patch Topical Daily Afshin Dela MD      methocarbamol  500 mg Oral Q6H [address] Afshin Deal MD      metroNIDAZOLE  500 mg Intravenous Q8H Afshin Deal MD Stopped (09/27/22 0572)    nicotine  1 patch Transdermal Daily Afshin Deal MD      ondansetron  4 mg Intravenous Q6H PRN Afshin Deal MD      oxyCODONE  10 mg Oral Q4H PRN Afshin Deal MD      oxyCODONE  5 mg Oral Q4H PRN Afshin Deal MD       Continuous Infusions:lactated ringers, 125 mL/hr, Last Rate: 125 mL/hr (09/27/22 0248)      PRN Meds:  HYDROmorphone, 0 5 mg, Q3H PRN  ondansetron, 4 mg, Q6H PRN  oxyCODONE, 10 mg, Q4H PRN  oxyCODONE, 5 mg, Q4H PRN      VTE Pharmacologic Prophylaxis: Enoxaparin (Lovenox)  VTE Mechanical Prophylaxis: sequential compression device

## 2022-09-26 NOTE — QUICK NOTE
Post Op Check Note - Surgery Resident  Ryan King 55 y o  male MRN: [id number]  Unit/Bed#: E4 -01 Encounter: [id number]    ASSESSMENT:  Ryan King is a 55 y o  male who is status post ex-lap, sigmoidectomy, loop     Subjective: Endorses significant abdominal pain, had been declining pain meds but now is amenable to opiate analgesics    Physical Exam:  GEN: NAD  CV: RRR  Lung: Normal effort  Ab: Soft, appropriately somewhat tender  Neuro: A+Ox3  Incisions: Dressing CDI, stoma pink    Blood pressure 115/67, pulse 101, temperature 99 8 °F (37 7 °C), temperature source Temporal, resp  rate 18, weight 81 7 kg (180 lb 1 9 oz), SpO2 (!) 89 %  ,Body mass index is 34 03 kg/m²        Intake/Output Summary (Last 24 hours) at 9/26/2022 1604  Last data filed at 9/26/2022 1430  Gross per 24 hour   Intake 6750 ml   Output 2040 ml   Net 4710 ml       Invasive Devices  Report    Peripheral Intravenous Line  Duration           Peripheral IV 09/25/22 Right Antecubital <1 day    Peripheral IV 09/26/22 Left Forearm <1 day          Drain  Duration           Colostomy Loop LLQ <1 day    Urethral Catheter Non-latex 16 Fr  <1 day

## 2022-09-26 NOTE — PLAN OF CARE
Problem: Potential for Falls  Goal: Patient will remain free of falls  Description: INTERVENTIONS:  - Educate patient/family on patient safety including physical limitations  - Instruct patient to call for assistance with activity   - Consult OT/PT to assist with strengthening/mobility   - Keep Call bell within reach  - Keep bed low and locked with side rails adjusted as appropriate  - Keep care items and personal belongings within reach  - Initiate and maintain comfort rounds  - Make Fall Risk Sign visible to staff  - Offer Toileting every  Hours, in advance of need  - Initiate/Maintain alarm  - Obtain necessary fall risk management equipment:   - Apply yellow socks and bracelet for high fall risk patients  - Consider moving patient to room near nurses station  Outcome: Progressing     Problem: PAIN - ADULT  Goal: Verbalizes/displays adequate comfort level or baseline comfort level  Description: Interventions:  - Encourage patient to monitor pain and request assistance  - Assess pain using appropriate pain scale  - Administer analgesics based on type and severity of pain and evaluate response  - Implement non-pharmacological measures as appropriate and evaluate response  - Consider cultural and social influences on pain and pain management  - Notify physician/advanced practitioner if interventions unsuccessful or patient reports new pain  Outcome: Progressing     Problem: DISCHARGE PLANNING  Goal: Discharge to home or other facility with appropriate resources  Description: INTERVENTIONS:  - Identify barriers to discharge w/patient and caregiver  - Arrange for needed discharge resources and transportation as appropriate  - Identify discharge learning needs (meds, wound care, etc )  - Arrange for interpretive services to assist at discharge as needed  - Refer to Case Management Department for coordinating discharge planning if the patient needs post-hospital services based on physician/advanced practitioner order or complex needs related to functional status, cognitive ability, or social support system  Outcome: Progressing     Problem: GASTROINTESTINAL - ADULT  Goal: Minimal or absence of nausea and/or vomiting  Description: INTERVENTIONS:  - Administer IV fluids if ordered to ensure adequate hydration  - Maintain NPO status until nausea and vomiting are resolved  - Nasogastric tube if ordered  - Administer ordered antiemetic medications as needed  - Provide nonpharmacologic comfort measures as appropriate  - Advance diet as tolerated, if ordered  - Consider nutrition services referral to assist patient with adequate nutrition and appropriate food choices  Outcome: Progressing  Goal: Maintains or returns to baseline bowel function  Description: INTERVENTIONS:  - Assess bowel function  - Encourage oral fluids to ensure adequate hydration  - Administer IV fluids if ordered to ensure adequate hydration  - Administer ordered medications as needed  - Encourage mobilization and activity  - Consider nutritional services referral to assist patient with adequate nutrition and appropriate food choices  Outcome: Progressing  Goal: Maintains adequate nutritional intake  Description: INTERVENTIONS:  - Monitor percentage of each meal consumed  - Identify factors contributing to decreased intake, treat as appropriate  - Assist with meals as needed  - Monitor I&O, weight, and lab values if indicated  - Obtain nutrition services referral as needed  Outcome: Progressing     Problem: GENITOURINARY - ADULT  Goal: Maintains or returns to baseline urinary function  Description: INTERVENTIONS:  - Assess urinary function  - Encourage oral fluids to ensure adequate hydration if ordered  - Administer IV fluids as ordered to ensure adequate hydration  - Administer ordered medications as needed  - Offer frequent toileting  - Follow urinary retention protocol if ordered  Outcome: Progressing  Goal: Absence of urinary retention  Description: INTERVENTIONS:  - Assess patients ability to void and empty bladder  - Monitor I/O  - Bladder scan as needed  - Discuss with physician/AP medications to alleviate retention as needed  - Discuss catheterization for long term situations as appropriate  Outcome: Progressing  Goal: Urinary catheter remains patent  Description: INTERVENTIONS:  - Assess patency of urinary catheter  - If patient has a chronic montejo, consider changing catheter if non-functioning  - Follow guidelines for intermittent irrigation of non-functioning urinary catheter  Outcome: Progressing     Problem: SKIN/TISSUE INTEGRITY - ADULT  Goal: Skin Integrity remains intact(Skin Breakdown Prevention)  Description: Assess:  -Perform Matthew assessment every   -Clean and moisturize skin every   -Inspect skin when repositioning, toileting, and assisting with ADLS  -Assess under medical devices such as  every   -Assess extremities for adequate circulation and sensation     Bed Management:  -Have minimal linens on bed & keep smooth, unwrinkled  -Change linens as needed when moist or perspiring  -Avoid sitting or lying in one position for more than  hours while in bed  -Keep HOB at degrees     Toileting:  -Offer bedside commode  -Assess for incontinence every   -Use incontinent care products after each incontinent episode such as     Activity:  -Mobilize patient  times a day  -Encourage activity and walks on unit  -Encourage or provide ROM exercises   -Turn and reposition patient every  Hours  -Use appropriate equipment to lift or move patient in bed  -Instruct/ Assist with weight shifting every  when out of bed in chair  -Consider limitation of chair time  hour intervals    Skin Care:  -Avoid use of baby powder, tape, friction and shearing, hot water or constrictive clothing  -Relieve pressure over bony prominences using   -Do not massage red bony areas    Next Steps:  -Teach patient strategies to minimize risks such as    -Consider consults to interdisciplinary teams such as   Outcome: Progressing  Goal: Incision(s), wounds(s) or drain site(s) healing without S/S of infection  Description: INTERVENTIONS  - Assess and document dressing, incision, wound bed, drain sites and surrounding tissue  - Provide patient and family education  - Perform skin care/dressing changes every  Outcome: Progressing     Problem: HEMATOLOGIC - ADULT  Goal: Maintains hematologic stability  Description: INTERVENTIONS  - Assess for signs and symptoms of bleeding or hemorrhage  - Monitor labs  - Administer supportive blood products/factors as ordered and appropriate  Outcome: Progressing

## 2022-09-26 NOTE — ANESTHESIA PREPROCEDURE EVALUATION
Procedure:  LAPAROTOMY EXPLORATORY (N/A Abdomen)  RESECTION COLON SIGMOID (N/A Abdomen)  COLOSTOMY END (N/A Abdomen)    Relevant Problems   ANESTHESIA (within normal limits)      GI/HEPATIC   (+) Hepatitis C      NEURO/PSYCH  polysubstance abuse      PULMONARY   (+) Asthma   (+) COPD (chronic obstructive pulmonary disease) (HCC)        Physical Exam    Airway    Mallampati score: II  TM Distance: >3 FB  Neck ROM: full     Dental   No notable dental hx     Cardiovascular  Rhythm: regular, Rate: normal, Cardiovascular exam normal    Pulmonary  Pulmonary exam normal Breath sounds clear to auscultation,     Other Findings        Anesthesia Plan  ASA Score- 3 Emergent    Anesthesia Type- general with ASA Monitors  Additional Monitors:   Airway Plan: ETT  Plan Factors-    Chart reviewed  EKG reviewed  Existing labs reviewed  Patient summary reviewed  Patient is a current smoker  Patient instructed to abstain from smoking on day of procedure  Patient smoked on day of surgery  Induction- intravenous and rapid sequence induction  Postoperative Plan-     Informed Consent- Anesthetic plan and risks discussed with patient

## 2022-09-26 NOTE — ED PROVIDER NOTES
History  Chief Complaint   Patient presents with    Abdominal Pain    Rectal Bleeding     Pt c/o abd pain and rectal bleeding started at approx 830pm       Patient is a 55year old male with PMHx asthma, COPD, Hepatitic C, presenting to the ED for evaluation of severe abdominal pain and rectal bleeding beginning at 2030 this evening  Patient states that he was using a dildo to pleasure himself and he "pushed too hard" and an extra 4 inches of the dildo went into his rectum  Patient routinely tolerated 10 inches, but estimates 14 inches went into his rectum at the time of the incident  Patient felt a lot of pressure in his rectum, followed by a "pop" sensation, followed by severe pain  Patient took the dildo out, at which time he had a bloody bowel movement with bright red blood  Patient states that the pain has been getting worse, is now diffuse over the entire abdomen, associated with nausea and patient reports it is difficulty to breathe deep because of the pain  Patient had a bloody bowel movement in the waiting room of the ED  He also feels very cold and shaky  Denies fevers, cough or congestion, vomiting, urinary complaints  Prior to Admission Medications   Prescriptions Last Dose Informant Patient Reported?  Taking?   ibuprofen (MOTRIN) 400 mg tablet   No No   Sig: Take 1 tablet (400 mg total) by mouth every 6 (six) hours as needed for mild pain   Patient not taking: Reported on 2020   naproxen (NAPROSYN) 500 mg tablet   No No   Sig: Take 1 tablet (500 mg total) by mouth 2 (two) times a day with meals for 3 days   predniSONE 50 mg tablet   No No   Simg po qd x 3 days, then 40mg po qd x 3 days, then 20mg po qd x 3 days, then 10mg po qd x 3 days, then stop   Patient not taking: Reported on 2020      Facility-Administered Medications: None       Past Medical History:   Diagnosis Date    Asthma     Bronchitis     COPD (chronic obstructive pulmonary disease) (HCC)     Hepatitis C     Pneumonia        Past Surgical History:   Procedure Laterality Date    MOUTH SURGERY         History reviewed  No pertinent family history  I have reviewed and agree with the history as documented  E-Cigarette/Vaping    E-Cigarette Use Never User      E-Cigarette/Vaping Substances     Social History     Tobacco Use    Smoking status: Current Every Day Smoker     Packs/day: 0 25     Types: Cigarettes    Smokeless tobacco: Never Used   Vaping Use    Vaping Use: Never used   Substance Use Topics    Alcohol use: Not Currently    Drug use: Yes     Types: Marijuana, Heroin, Methamphetamines, Fentanyl        Review of Systems   Constitutional: Negative for activity change, chills and fever  HENT: Negative for congestion, ear pain and sore throat  Eyes: Negative for pain and visual disturbance  Respiratory: Negative for cough and shortness of breath  Cardiovascular: Negative for chest pain and palpitations  Gastrointestinal: Positive for abdominal distention, abdominal pain, anal bleeding, blood in stool, nausea and rectal pain  Negative for diarrhea and vomiting  Genitourinary: Negative for dysuria and hematuria  Musculoskeletal: Negative for arthralgias and back pain  Skin: Negative for color change and rash  Neurological: Negative for seizures, syncope and headaches  All other systems reviewed and are negative        Physical Exam  ED Triage Vitals   Temperature Pulse Respirations Blood Pressure SpO2   09/25/22 2132 09/25/22 2133 09/25/22 2133 09/25/22 2133 09/25/22 2135   98 1 °F (36 7 °C) 81 16 129/81 91 %      Temp src Heart Rate Source Patient Position - Orthostatic VS BP Location FiO2 (%)   -- 09/25/22 2133 09/25/22 2133 09/25/22 2133 --    Monitor Sitting Right arm       Pain Score       09/25/22 2135       7             Orthostatic Vital Signs  Vitals:    09/25/22 2330 09/26/22 0115 09/26/22 0145 09/26/22 0245   BP: 117/74 115/66 120/61 110/65   Pulse: 84 98 96 96   Patient Position - Orthostatic VS:           Physical Exam  Vitals and nursing note reviewed  Constitutional:       General: He is in acute distress  Appearance: He is well-developed and normal weight  He is not ill-appearing or diaphoretic  Comments: Patient laying in fetal position with knees drawn up to abdomen       HENT:      Head: Normocephalic and atraumatic  Eyes:      Extraocular Movements: Extraocular movements intact  Conjunctiva/sclera: Conjunctivae normal    Cardiovascular:      Rate and Rhythm: Normal rate and regular rhythm  Heart sounds: Normal heart sounds  No murmur heard  Pulmonary:      Effort: Pulmonary effort is normal  No respiratory distress  Breath sounds: Normal breath sounds  No wheezing, rhonchi or rales  Abdominal:      General: Bowel sounds are normal  There is distension  Palpations: Abdomen is soft  Tenderness: There is abdominal tenderness (exquisite tenderness diffusely to the abdomen with +guarding)  Hernia: No hernia is present  Genitourinary:     Comments: External exam of the rectum reveals no active bleeding or external hemorrhoids  Musculoskeletal:      Cervical back: Neck supple  Skin:     General: Skin is warm and dry  Capillary Refill: Capillary refill takes less than 2 seconds  Neurological:      Mental Status: He is alert and oriented to person, place, and time  Psychiatric:         Mood and Affect: Mood is anxious           ED Medications  Medications   HYDROmorphone (DILAUDID) injection 1 mg (0 mg Intravenous Hold 9/26/22 0013)   nicotine (NICODERM CQ) 7 mg/24hr TD 24 hr patch 1 patch (has no administration in time range)   enoxaparin (LOVENOX) subcutaneous injection 40 mg (has no administration in time range)   ondansetron (ZOFRAN) injection 4 mg (has no administration in time range)   lactated ringers bolus 1,000 mL (1,000 mL Intravenous New Bag 9/26/22 0217)   ceFAZolin (ANCEF) IVPB (premix in dextrose) 2,000 mg 50 mL (has no administration in time range)   HYDROmorphone (DILAUDID) injection 0 5 mg (has no administration in time range)   oxyCODONE (ROXICODONE) immediate release tablet 10 mg (has no administration in time range)   oxyCODONE (ROXICODONE) IR tablet 5 mg (has no administration in time range)   acetaminophen (TYLENOL) tablet 650 mg (has no administration in time range)   metroNIDAZOLE (FLAGYL) IVPB (premix) 500 mg 100 mL (has no administration in time range)   sodium chloride 0 9 % bolus 1,000 mL (0 mL Intravenous Stopped 9/26/22 0212)   ondansetron (ZOFRAN) injection 4 mg (4 mg Intravenous Given 9/25/22 2333)   ketorolac (TORADOL) injection 15 mg (15 mg Intravenous Given 9/25/22 2334)   iohexol (OMNIPAQUE) 350 MG/ML injection (SINGLE-DOSE) 100 mL (100 mL Intravenous Given 9/26/22 0057)       Diagnostic Studies  Results Reviewed     Procedure Component Value Units Date/Time    Platelet count [[id number]]  (Normal) Collected: 09/26/22 0212    Lab Status: Final result Specimen: Blood from Arm, Right Updated: 09/26/22 0233     Platelets 437 Thousands/uL      MPV 10 7 fL     Protime-INR [[id number]]  (Abnormal) Collected: 09/26/22 0015    Lab Status: Final result Specimen: Blood from Arm, Right Updated: 09/26/22 0045     Protime 16 5 seconds      INR 1 33    APTT [[id number]]  (Normal) Collected: 09/26/22 0015    Lab Status: Final result Specimen: Blood from Arm, Right Updated: 09/26/22 0045     PTT 26 seconds     Comprehensive metabolic panel [[id number]]  (Abnormal) Collected: 09/25/22 2333    Lab Status: Final result Specimen: Blood from Arm, Left Updated: 09/25/22 2358     Sodium 142 mmol/L      Potassium 4 2 mmol/L      Chloride 105 mmol/L      CO2 31 mmol/L      ANION GAP 6 mmol/L      BUN 21 mg/dL      Creatinine 1 04 mg/dL      Glucose 101 mg/dL      Calcium 9 6 mg/dL      AST 47 U/L      ALT 67 U/L      Alkaline Phosphatase 49 U/L      Total Protein 8 0 g/dL      Albumin 4 4 g/dL      Total Bilirubin 0 76 mg/dL      eGFR [de-identified] ml/min/1 73sq m     Narrative:      [place] guidelines for Chronic Kidney Disease (CKD):     Stage 1 with normal or high GFR (GFR > 90 mL/min/1 73 square meters)    Stage 2 Mild CKD (GFR = 60-89 mL/min/1 73 square meters)    Stage 3A Moderate CKD (GFR = 45-59 mL/min/1 73 square meters)    Stage 3B Moderate CKD (GFR = 30-44 mL/min/1 73 square meters)    Stage 4 Severe CKD (GFR = 15-29 mL/min/1 73 square meters)    Stage 5 End Stage CKD (GFR <15 mL/min/1 73 square meters)  Note: GFR calculation is accurate only with a steady state creatinine    CBC and differential [[id number]]  (Abnormal) Collected: 09/25/22 2333    Lab Status: Final result Specimen: Blood from Arm, Left Updated: 09/25/22 2345     WBC 3 31 Thousand/uL      RBC 4 81 Million/uL      Hemoglobin 16 1 g/dL      Hematocrit 47 4 %      MCV 99 fL      MCH 33 5 pg      MCHC 34 0 g/dL      RDW 12 4 %      MPV 10 9 fL      Platelets 251 Thousands/uL      nRBC 0 /100 WBCs      Neutrophils Relative 63 %      Immat GRANS % 0 %      Lymphocytes Relative 27 %      Monocytes Relative 6 %      Eosinophils Relative 3 %      Basophils Relative 1 %      Neutrophils Absolute 2 07 Thousands/µL      Immature Grans Absolute 0 01 Thousand/uL      Lymphocytes Absolute 0 90 Thousands/µL      Monocytes Absolute 0 20 Thousand/µL      Eosinophils Absolute 0 11 Thousand/µL      Basophils Absolute 0 02 Thousands/µL                  CT abdomen pelvis with contrast   Final Result by Cathy Harrison MD (09/26 0132)      Scattered foci of free intraperitoneal air compatible with bowel perforation given clinical history  Circumferential wall thickening/mucosal edema involving the rectosigmoid junction, nonspecific  Small volume abdominopelvic ascites/free fluid  Above findings discussed with Dr Sheila Mccrary at 1:15 AM on 9/26/2022              Workstation performed: [id number]         XR abdomen 1 view portable   ED Interpretation by Chay Alexandra Valene Primrose, DO (09/26 8910)   Possible small amount of free air R paracolic gutter              Procedures  Procedures      ED Course         With history of symptoms, there is concern for acute injury to the colon, particularly perforation  Labs ordered and CT abd/pelvis ordered  Patient offered pain medication, but he does not want opiates as he is a recovering addict and is nearly 2 years clean  Patient given IVF, Zofran, and Toradol IV  EKG: NSR 90, normal axis, normal intervals, no acute ischemic changes as read by me    I Tigertexted surgery on-call immediately after orders placed for bedside surgical evaluation, as I am concerned of possible need for surgical intervention  They will do bedside evaluation  Surgery resident at bedside  Patient seems to have worsening pain and concerning exam  Patient agrees to take Dilaudid 1 mg IV for pain control  Surgery recommends upright XR abdomen  On re-eval, patient placed on 2L NC O2 for hypoxia in the high 90s  States that he is hesitant to take deep breaths because of the pain  No respiratory distress noted  Remains tender in the abdomen  Patient initially accepted Dilaudid, but declined it because it did not want to ruin his sobriety  CT pending  CT findings reviewed  Surgical resident spoke with radiologist at the time of imaging result  He discussed the case with dr Loreta Whitten, surgical attending; arrangements made for preparation to the OR  Patient was informed of his CT result  Was consented by surgical resident for OR  Patient given FFP 1 unit  And abx  Transferred to OR          MDM    Disposition  Final diagnoses:   Abdominal pain   Rectal bleeding   Perforated bowel ([facility] [address] )     Time reflects when diagnosis was documented in both MDM as applicable and the Disposition within this note     Time User Action Codes Description Comment    9/25/2022 11:53 PM Shirlee Boas Add [R10 9] Abdominal pain     9/26/2022  2:50 AM Shirlee Boas Add [K62 5] Rectal bleeding     9/26/2022  2:50 AM Ange Coughlin [K63 1] Perforated bowel [facility])       ED Disposition     ED Disposition   Admit    Condition   --    Date/Time   Mon Sep 26, 2022  2:50 AM    Comment   --         Follow-up Information    None         Patient's Medications   Discharge Prescriptions    No medications on file     No discharge procedures on file  PDMP Review     None           ED Provider  Attending physically available and evaluated [facility]  I managed the patient along with the ED Attending      Electronically Signed by         Alejandro Burr DO  09/26/22 6988

## 2022-09-26 NOTE — OP NOTE
OPERATIVE REPORT  PATIENT NAME: Jonathan Colvin    :  1965  MRN: [id number]  Pt Location: [facility] OR ROOM 02    SURGERY DATE: 2022    Surgeon(s) and Role:     * Nolberto Alexander MD - Primary     * Nuvia Lemos MD - Assisting    First assistant was necessary for the surgical safety of the case including suturing, retraction hemostasis  A qualified resident was available  Preop Diagnosis:  Abdominal pain [R10 9]    Post-Op Diagnosis Codes:     * Abdominal pain [R10 9]    Procedure(s) (LRB):  LAPAROTOMY EXPLORATORY, LYSIS OF ADHESIONS, LOW ANTERIOR RESECTION WITH PRIMARY ANASTOMOSIS (N/A)  RESECTION COLON SIGMOID (N/A)  COLOSTOMY END (N/A)  SIGMOIDOSCOPY RIGID (N/A)  COLOSTOMY LOOP (N/A)    LOW ANTERIOR RESECTION  EXPLORATORY LAPAROTOMY  LYSIS OF ADHESIONS  RIGID SIGMOIDOSCOPY    Correction: no end colostomy, only loop colostomy  Specimen(s):  ID Type Source Tests Collected by Time Destination   1 : staple end-proximal  iatrogenic self induced perforation Tissue Large Intestine, Sigmoid Colon TISSUE EXAM Nolberto Alexander MD 2022 1774    2 : anastomotic donuts Tissue Soft Tissue, Other TISSUE EXAM Nolberto Alexander MD 2022 7720    A : peritonitis Body Fluid Peritoneal Fluid ANAEROBIC CULTURE AND GRAM STAIN, BODY FLUID CULTURE AND GRAM STAIN Nolberto Alexander MD 2022 0507        Estimated Blood Loss:   Minimal    Drains:  Colostomy Loop LLQ (Active)   Number of days: 0       Urethral Catheter Non-latex 16 Fr  (Active)   Number of days: 0       [REMOVED] NG/OG/Enteral Tube Orogastric 18 Fr (Removed)   Number of days: 0       Anesthesia Type:   General    Operative Indications:  Abdominal pain [R10 9]  PERFORATED VISCUS    Operative Findings:  4 cm to 5 cm left lateral tear of the rectosigmoid  Dirty stool and brown ascites, cultured  Adhesions      Complications:   None    Procedure and Technique:  The patient was seen again in the Holding Room   The risks, benefits, complications, treatment options, and expected outcomes were discussed with the patient  The patient and/or family concurred with the proposed plan, giving informed consent  Informed consent was personally discussed with the patient and reviewed  The site of surgery properly noted/marked  The patient was taken to Operating Room, identified as Onofre Fraga  and the procedure verified  A Time Out was held after prepping and draping in sterile fashion  The above information was confirmed  Prior to the induction of general anesthesia, antibiotic prophylaxis was administered  General endotracheal anesthesia was then administered and tolerated well  After the induction, the surgical area was prepped in the usual sterile fashion  A midline incision was made in the lower abdomen  Dissection carried out to the fashion the abdomen was entered under direct vision  Immediately was dirty ascitic brown fluid from the abdomen  This was cultured  The abdomen was explored  There was reactive changes the small bowel in the pelvis  When the bowel was briefly examined in then retracted into the anterior abdomen  The rectosigmoid area was explored immediately was seen a very long 4-5 cm longitudinal lateral tear along the lateral aspect of the left side of the rectosigmoid  There was stool coming from this very large defect  With the bowel for in place in the abdominal contents packed superiorly cephalad, the white line of Toldt was taken down  Fortunately had a very floppy sigmoid colon which was redundant  White line of Toldt was taken down the pelvic brim over to the rectum  The ureter was identified and pushed gently back down into the retroperitoneum out of the field of dissection  A MITCHELL was come across proximal to the tear in the sigmoid colon  LigaSure was used to take the mesentery, hugging the back of the colon in keeping the ureter in view, to the area at least 2-3 cm below the tear    This was carried down over to the rectum to perform a low anterior resection  LigaSure and cautery were used to advance along the rectal wall, taking care to hug the serosa  The bladder had to be lifted off the anterior aspect of the rectum for several cm, in order to get low enough below the tear  This is well below the pelvic brim, under the bladder to the low rectum  A TA 60 stapling device came across the rectum after securing the lateral aspects of the rectum using 2-0 Prolene sutures on hemostats  The staple line was without excessive bleeding and this was easily controlled with cautery  The specimen was sent off the field  The ureter was re-examined and seen to be intact  Packing off the abdomen in using a bowel clamp, the distal sigmoid colon was identified  There was great redundancy in the sigmoid to descending area  The staple line was removed from the proximal stump and a 2-0 Prolene using a pursestring suture to secure the anvil  Care was taken that the all the debris was taken off the anvil for secure anastomosis  The surgeon performed a rigid sigmoidoscopy and the anastomosis was figure to be around 8 cm or less  This was reachable by the digital exam     The stapler was placed into the rectum and positions  The surgeon re-gowned and performed the anastomosis from above  The assistant open the stapling device, the EEA was secured appropriately, the anvil clicked onto the stapler, taking care there was no twist in the colon  Once at the appropriate markings, the EEA was fired  The EEA was removed in the standard fashion  There were 2 intact donuts  The staple line was reinforced using 3-0 silk interrupted sutures especially along the posterior aspect  Hemostasis was assured  The pelvis was copiously irrigated and suctioned until clear  The ureter was recheck found to be well out of the way of the area of the dissection      The sigmoid colon descending colon remnant was examined and this was quite redundant still and therefore it was decided to do a  loop sigmoid colostomy  Using the appropriate technique, a trans rectus incision circular was made on the left abdomen  Anterior fascia was opened, muscle split, posterior fascia opened and the colon brought easily into the wound without any tension on anastomosis  It should be noted the patient was quite thin  As there was no tension on the wound the colostomy sat nicely without retraction  The omentum was placed over the wound into the abdomen  Wound closure tray was used  The abdomen was closed using a looped 1  PDS in a 4-1 fashion  The wound was left open and wound chel were used, secured in between using staples  RFA wand  revealed the sponge and needle counts to be correct  The ostomy was matured in a rosebud fashion, using 2-0 chromic sutures so there was a 60/40 rosebud of the loop  The wound was dressed  The stoma appliance was placed  The patient tolerated the procedure in stable condition         I was present for the entire procedure    Patient Disposition:  PACU       Signature:   Gabriel Burr MD  Date: 9/26/2022 Time: 6:39 AM

## 2022-09-27 ENCOUNTER — TELEPHONE (OUTPATIENT)
Dept: SURGERY | Facility: CLINIC | Age: 57
End: 2022-09-27

## 2022-09-27 ENCOUNTER — HOME HEALTH ADMISSION (OUTPATIENT)
Dept: HOME HEALTH SERVICES | Facility: HOME HEALTHCARE | Age: 57
End: 2022-09-27
Payer: COMMERCIAL

## 2022-09-27 LAB
ALBUMIN SERPL BCP-MCNC: 3.3 G/DL (ref 3.5–5)
ALP SERPL-CCNC: 29 U/L (ref 46–116)
ALT SERPL W P-5'-P-CCNC: 43 U/L (ref 12–78)
ANION GAP SERPL CALCULATED.3IONS-SCNC: 8 MMOL/L (ref 4–13)
AST SERPL W P-5'-P-CCNC: 27 U/L (ref 5–45)
BASOPHILS # BLD AUTO: 0.03 THOUSANDS/ΜL (ref 0–0.1)
BASOPHILS NFR BLD AUTO: 0 % (ref 0–1)
BILIRUB SERPL-MCNC: 0.78 MG/DL (ref 0.2–1)
BUN SERPL-MCNC: 14 MG/DL (ref 5–25)
CA-I BLD-SCNC: 1.1 MMOL/L (ref 1.12–1.32)
CALCIUM ALBUM COR SERPL-MCNC: 9.3 MG/DL (ref 8.3–10.1)
CALCIUM SERPL-MCNC: 8.7 MG/DL (ref 8.3–10.1)
CHLORIDE SERPL-SCNC: 110 MMOL/L (ref 96–108)
CO2 SERPL-SCNC: 26 MMOL/L (ref 21–32)
CREAT SERPL-MCNC: 0.84 MG/DL (ref 0.6–1.3)
EOSINOPHIL # BLD AUTO: 0.08 THOUSAND/ΜL (ref 0–0.61)
EOSINOPHIL NFR BLD AUTO: 1 % (ref 0–6)
ERYTHROCYTE [DISTWIDTH] IN BLOOD BY AUTOMATED COUNT: 12.6 % (ref 11.6–15.1)
GFR SERPL CREATININE-BSD FRML MDRD: 98 ML/MIN/1.73SQ M
GLUCOSE SERPL-MCNC: 99 MG/DL (ref 65–140)
HCT VFR BLD AUTO: 35.3 % (ref 36.5–49.3)
HGB BLD-MCNC: 11.8 G/DL (ref 12–17)
IMM GRANULOCYTES # BLD AUTO: 0.05 THOUSAND/UL (ref 0–0.2)
IMM GRANULOCYTES NFR BLD AUTO: 1 % (ref 0–2)
LYMPHOCYTES # BLD AUTO: 1.21 THOUSANDS/ΜL (ref 0.6–4.47)
LYMPHOCYTES NFR BLD AUTO: 13 % (ref 14–44)
MAGNESIUM SERPL-MCNC: 2.2 MG/DL (ref 1.6–2.6)
MCH RBC QN AUTO: 33.8 PG (ref 26.8–34.3)
MCHC RBC AUTO-ENTMCNC: 33.4 G/DL (ref 31.4–37.4)
MCV RBC AUTO: 101 FL (ref 82–98)
MONOCYTES # BLD AUTO: 0.71 THOUSAND/ΜL (ref 0.17–1.22)
MONOCYTES NFR BLD AUTO: 7 % (ref 4–12)
NEUTROPHILS # BLD AUTO: 7.57 THOUSANDS/ΜL (ref 1.85–7.62)
NEUTS SEG NFR BLD AUTO: 78 % (ref 43–75)
NRBC BLD AUTO-RTO: 0 /100 WBCS
PHOSPHATE SERPL-MCNC: 1.8 MG/DL (ref 2.7–4.5)
PLATELET # BLD AUTO: 154 THOUSANDS/UL (ref 149–390)
PMV BLD AUTO: 10.8 FL (ref 8.9–12.7)
POTASSIUM SERPL-SCNC: 4 MMOL/L (ref 3.5–5.3)
PROT SERPL-MCNC: 6.7 G/DL (ref 6.4–8.4)
RBC # BLD AUTO: 3.49 MILLION/UL (ref 3.88–5.62)
SODIUM SERPL-SCNC: 144 MMOL/L (ref 135–147)
WBC # BLD AUTO: 9.65 THOUSAND/UL (ref 4.31–10.16)

## 2022-09-27 PROCEDURE — 82330 ASSAY OF CALCIUM: CPT | Performed by: SURGERY

## 2022-09-27 PROCEDURE — 99024 POSTOP FOLLOW-UP VISIT: CPT | Performed by: SURGERY

## 2022-09-27 PROCEDURE — 83735 ASSAY OF MAGNESIUM: CPT | Performed by: SURGERY

## 2022-09-27 PROCEDURE — 84100 ASSAY OF PHOSPHORUS: CPT | Performed by: SURGERY

## 2022-09-27 PROCEDURE — 85025 COMPLETE CBC W/AUTO DIFF WBC: CPT | Performed by: SURGERY

## 2022-09-27 PROCEDURE — 80053 COMPREHEN METABOLIC PANEL: CPT | Performed by: SURGERY

## 2022-09-27 RX ADMIN — SODIUM CHLORIDE, SODIUM LACTATE, POTASSIUM CHLORIDE, AND CALCIUM CHLORIDE 125 ML/HR: .6; .31; .03; .02 INJECTION, SOLUTION INTRAVENOUS at 17:12

## 2022-09-27 RX ADMIN — CEFAZOLIN SODIUM 2000 MG: 2 SOLUTION INTRAVENOUS at 01:37

## 2022-09-27 RX ADMIN — METHOCARBAMOL 500 MG: 500 TABLET ORAL at 11:19

## 2022-09-27 RX ADMIN — ACETAMINOPHEN 650 MG: 325 TABLET ORAL at 16:58

## 2022-09-27 RX ADMIN — METRONIDAZOLE 500 MG: 500 INJECTION, SOLUTION INTRAVENOUS at 09:21

## 2022-09-27 RX ADMIN — ENOXAPARIN SODIUM 40 MG: 40 INJECTION SUBCUTANEOUS at 08:46

## 2022-09-27 RX ADMIN — METHOCARBAMOL 500 MG: 500 TABLET ORAL at 05:13

## 2022-09-27 RX ADMIN — ACETAMINOPHEN 650 MG: 325 TABLET ORAL at 05:13

## 2022-09-27 RX ADMIN — GABAPENTIN 100 MG: 100 CAPSULE ORAL at 08:46

## 2022-09-27 RX ADMIN — GABAPENTIN 100 MG: 100 CAPSULE ORAL at 16:58

## 2022-09-27 RX ADMIN — CEFAZOLIN SODIUM 2000 MG: 2 SOLUTION INTRAVENOUS at 17:05

## 2022-09-27 RX ADMIN — CEFAZOLIN SODIUM 2000 MG: 2 SOLUTION INTRAVENOUS at 09:21

## 2022-09-27 RX ADMIN — GABAPENTIN 100 MG: 100 CAPSULE ORAL at 21:53

## 2022-09-27 RX ADMIN — ACETAMINOPHEN 650 MG: 325 TABLET ORAL at 11:19

## 2022-09-27 RX ADMIN — ACETAMINOPHEN 650 MG: 325 TABLET ORAL at 21:53

## 2022-09-27 RX ADMIN — METHOCARBAMOL 500 MG: 500 TABLET ORAL at 17:05

## 2022-09-27 RX ADMIN — METRONIDAZOLE 500 MG: 500 INJECTION, SOLUTION INTRAVENOUS at 01:37

## 2022-09-27 RX ADMIN — METRONIDAZOLE 500 MG: 500 INJECTION, SOLUTION INTRAVENOUS at 17:05

## 2022-09-27 RX ADMIN — SODIUM CHLORIDE, SODIUM LACTATE, POTASSIUM CHLORIDE, AND CALCIUM CHLORIDE 125 ML/HR: .6; .31; .03; .02 INJECTION, SOLUTION INTRAVENOUS at 02:48

## 2022-09-27 RX ADMIN — SODIUM CHLORIDE, SODIUM LACTATE, POTASSIUM CHLORIDE, AND CALCIUM CHLORIDE 125 ML/HR: .6; .31; .03; .02 INJECTION, SOLUTION INTRAVENOUS at 09:21

## 2022-09-27 NOTE — WOUND OSTOMY CARE
Progress Note- Ostomy  Amol Cliffordlist 55 y o  male  [id number]  [facility] MS [id number]    55year old male presented to the hospital with chills, nausea, rectal pain, and bleeding  Patient's history significant for hepatitis C, opioid abuse, COPD, smoker  Assessment:  Patient agreeable to assessment and ostomy education  He is independently ambulatory in his room  Saleem catheter in place  Midline abdominal incision approximated with staples, wics present proximally and distally draining small serosanguinous drainage  Macey-wound without redness or induration  Loop colostomy stoma is red, oval, measuring 2 1/2 x 2 1/4 inches  Peristomal skin and mucocutaneous junction area intact  Significant crease at 10 o'clock  Stoma support bar sutured in place  Stoma is approximately 2-3 cm from incision  There is scant amount of liquid brown effluent in the pouch  Pouch lifting off medially, closest to the incision  Pouch changed  Patient is primary learner, eager to learn, asking good questions  However, he expresses his stoma is "gross" and he feels it will limit his ability to work  Explained that the ostomy should not significantly limit any activities once he is cleared from surgery  Education:  Patient provided with colostomy education handouts--new patient colostomy kit from [employer], supply catalogs, and ostomy support information  Introduced the role of the ostomy RN  Instructed on surgery and how stoma was created  Instructed on healthy characteristics of the stoma and when to contact the physician  Demonstrated pouch types  Instructed on daily care of ostomy pouch--emptying when 1/3 full, changing pouch every 3-5 days or if leaking, ok to shower with or without pouch in place  Demonstrated pouch emptying/cleaning  Patient able to open/close velcro independently  Demonstrated pouching system change using one piece cut-to-fit pouch        Ostomy supplies placed at bedside  Patient agreeable to [facility] on discharge for ongoing ostomy assessment and education--case management made aware, will place referral     Plan:  1-Ostomy Care:  Empty pouch when 1/3 full of gas or stool  Encourage patient to participate in ostomy care  Change pouch every 3-5 days or if leaking  2-Pouch change (use one piece cut-to-fit pouch only--two piece pouches are too small):  remove pouch using push/pull method  Cleanse stoma and peristomal skin with warm water only, pat dry  Measure stoma and cut pouch barrier to appropriate size  Apply skin prep to peristomal skin  Fill crease at 10 o'clock with strip paste  Apply pouch  Hold gentle pressure with warm hand for 2 minutes to help with pouch adherence  Wound care team to follow for ongoing ostomy education and assessment  Wound 09/26/22 Abdomen N/A (Active)   Wound Description Clean;Dry; Intact; Pink 09/27/22 1200   Macey-wound Assessment Intact 09/27/22 1015   Wound Site Closure Staples 09/27/22 1200   Drainage Amount Small 09/27/22 1015   Drainage Description Serosanguineous 09/27/22 1015   Treatments Cleansed 09/27/22 1015   Dressing ABD 09/27/22 1200   Wound packed? Yes 09/27/22 1200   Dressing Changed Changed 09/27/22 1015   Patient Tolerance Tolerated well 09/27/22 1015   Dressing Status Clean;Dry; Intact 09/27/22 1200   Number of days: 1         Colostomy Loop LLQ (Active)   Stomal Appliance 2 piece;Clean;Dry; Intact 09/27/22 1200   Stoma Assessment Red;Budded 09/27/22 1200   Stoma Shape Budded 09/27/22 1200   Peristomal Assessment Clean; Intact 09/27/22 1015   Treatment Site care; Bag change 09/27/22 1200   Output (mL) 10 mL 09/27/22 1015   Number of days: 1120 Alisha Kelley, RN, [facility]

## 2022-09-27 NOTE — UTILIZATION REVIEW
Initial Clinical Review    Admission: Date/Time/Statement:   Admission Orders (From admission, onward)     Ordered        09/26/22 0151  Inpatient Admission  Once                      Orders Placed This Encounter   Procedures    Inpatient Admission     Standing Status:   Standing     Number of Occurrences:   1     Order Specific Question:   Level of Care     Answer:   Critical Care [15]     Order Specific Question:   Estimated length of stay     Answer:   More than 2 Midnights     Order Specific Question:   Certification     Answer:   I certify that inpatient services are medically necessary for this patient for a duration of greater than two midnights  See H&P and MD Progress Notes for additional information about the patient's course of treatment  ED Arrival Information     Expected   -    Arrival   9/25/2022 21:24    Acuity   Urgent            Means of arrival   Walk-In    Escorted by   Self    Service   Surgery-General    Admission type   Urgent            Arrival complaint   abdominal pain           Chief Complaint   Patient presents with    Abdominal Pain    Rectal Bleeding     Pt c/o abd pain and rectal bleeding started at approx 830pm         Initial Presentation: 55 y o  male presents to the ED from home with c/o acute onset rectal pain bleeding after using anal dildo - regular practice for him using 10 inch object and he used a larger object  It slipped and he felt a pop and acute onset worsening rectal pain followed by bleeding with chills, nausea  Since incident, no flatus, BM  PMH: asthma, hep C, opiate use (1 year clean)  Imaging shows Scattered foci of free intraperitoneal air compatible with bowel perforation, Circumferential wall thickening/mucosal edema involving the rectosigmoid junction, nonspecific; Small volume abdominopelvic ascites/free fluid  He is treated with IV fluids, IV antiemetics, IV analgesia  Concern for peritonitis and colon perforation    He is admitted to INPATIENT status with peritonitis and colonic perforation - admit to surgery, NPO, IV fluids, Operative intervention, PRN IV analgesia, antiemetics, T& C 2 Units PRBCs  To OR emergently       +++++++++++++++++  9/26 Operative Note    Preop Diagnosis:  Abdominal pain [R10 9]     Post-Op Diagnosis Codes:     * Abdominal pain [R10 9]    LOW ANTERIOR RESECTION  EXPLORATORY LAPAROTOMY  LYSIS OF ADHESIONS  RIGID SIGMOIDOSCOPY  Loop colostomy    Anesthesia Type: General    Operative Findings:  4 cm to 5 cm left lateral tear of the rectosigmoid  Dirty stool and brown ascites, cultured  Adhesions  ++++++++++++++++++    9/26 PO check - was initially declining pain meds but has significant abd pain and is now amenable to opiate analgesia  Has pulse ox 89%, stoma pink  Date: 9/27   Day 2/POD #1:   Ostomy has minimal bowel sweat  NPO, can have clears, continue montejo, IV antibiotics, IV fluids, ambulate  On exam pain now controlled  Feels bloated, hungry  Has been OOB  Normal respiratory effort, no edema  No leukocytosis  Afebrile  On room air       ED Triage Vitals   Temperature Pulse Respirations Blood Pressure SpO2   09/25/22 2132 09/25/22 2133 09/25/22 2133 09/25/22 2133 09/25/22 2135   98 1 °F (36 7 °C) 81 16 129/81 91 %      Temp Source Heart Rate Source Patient Position - Orthostatic VS BP Location FiO2 (%)   09/26/22 1016 09/25/22 2133 09/25/22 2133 09/25/22 2133 --   Temporal Monitor Sitting Right arm       Pain Score       09/25/22 2135       7          Wt Readings from Last 1 Encounters:   09/25/22 81 7 kg (180 lb 1 9 oz)     Additional Vital Signs:   09/27/22 0716 99 8 °F (37 7 °C) 82 18 111/63 70 91 % -- -- -- -- Lying   09/27/22 0318 99 3 °F (37 4 °C) 71 18 91/64 69 90 % -- -- None (Room air) -- Lying     09/26/22 2254 99 8 °F (37 7 °C) 76 18 104/71 77 90 % -- -- None (Room air) -- Sitting   09/26/22 1925 99 7 °F (37 6 °C) 96 18 113/74 79 90 % -- -- None (Room air) -- Lying   09/26/22 1516 99 8 °F (37 7 °C) 101 18 115/67 72 89 % Abnormal  -- -- None (Room air) -- Lying   09/26/22 1256 98 6 °F (37 °C) 83 18 98/53 83 90 % -- -- None (Room air) -- Lying   09/26/22 1200 98 4 °F (36 9 °C) 79 -- 104/64 -- -- -- -- -- -- --   09/26/22 1054 98 3 °F (36 8 °C) 85 16 111/59 80 91 % -- -- None (Room air) -- Sitting   09/26/22 1016 98 9 °F (37 2 °C) 76 16 102/60 76 92 % -- -- None (Room air) -- Sitting   09/26/22 0915 -- 82 15 115/68 86 92 % -- -- -- -- --   09/26/22 0900 98 6 °F (37 °C) 84 18 108/67 82 94 % -- -- -- WDL --   09/26/22 0845 -- 78 14 107/67 82 94 % -- -- -- -- --   09/26/22 0830 -- 84 20 114/66 85 93 % -- -- -- -- --   09/26/22 0815 -- 76 15 119/71 90 92 % -- -- -- -- --   09/26/22 0800 -- 78 13 120/71 89 92 % -- -- -- WDL --   09/26/22 0744 -- 90 22 -- -- 90 % 32 3 L/min Nasal cannula -- --   09/26/22 0734 -- 91 16 113/67 -- 93 % -- -- -- -- --   09/26/22 0719 -- 91 20 118/64 -- 91 % -- -- -- -- --   09/26/22 0704 99 2 °F (37 3 °C) 85 19 115/80 -- 88 % Abnormal  32 3 L/min Nasal cannula WDL --   09/26/22 0245 -- 96 18 110/65 81 93 % 32 3 L/min Nasal cannula -- --   09/26/22 0145 -- 96 18 120/61 84 94 % 32 3 L/min Nasal cannula -- --   09/26/22 0115 -- 98 22 115/66 83 93 % 32 3 L/min Nasal cannula -- --   09/26/22 0008 -- -- -- -- -- -- -- -- None (Room air) -- --   09/25/22 2330 -- 84 18 117/74 88 95 % -- -- None (Room air) -- --   09/25/22 2135 -- -- -- -- -- 91 % -- -- None (Room air) -- --       Pertinent Labs/Diagnostic Test Results:     9/25 ECG - Normal sinus rhythm    CT abdomen pelvis with contrast   Final Result by Priscilla Masters MD (09/26 0132)      Scattered foci of free intraperitoneal air compatible with bowel perforation given clinical history  Circumferential wall thickening/mucosal edema involving the rectosigmoid junction, nonspecific  Small volume abdominopelvic ascites/free fluid  XR abdomen 1 view portable   Unremarkable upper abdomen           Results from last 7 days   Lab Units 09/27/22  0507 09/26/22  0749 09/26/22  0212 09/25/22  2333   WBC Thousand/uL 9 65 8 70  --  3 31*   HEMOGLOBIN g/dL 11 8* 12 6  --  16 1   HEMATOCRIT % 35 3* 37 7  --  47 4   PLATELETS Thousands/uL 154 146* 160 218   NEUTROS ABS Thousands/µL 7 57  --   --  2 07         Results from last 7 days   Lab Units 09/27/22  0507 09/26/22  0749 09/25/22  2333   SODIUM mmol/L 144 143 142   POTASSIUM mmol/L 4 0 4 5 4 2   CHLORIDE mmol/L 110* 110* 105   CO2 mmol/L 26 22 31   ANION GAP mmol/L 8 11 6   BUN mg/dL 14 17 21   CREATININE mg/dL 0 84 0 91 1 04   EGFR ml/min/1 73sq m 98 94 80   CALCIUM mg/dL 8 7 7 8* 9 6   CALCIUM, IONIZED mmol/L 1 10*  --   --    MAGNESIUM mg/dL 2 2 1 4*  --    PHOSPHORUS mg/dL 1 8* 3 1  --      Results from last 7 days   Lab Units 09/27/22  0507 09/26/22  0749 09/25/22  2333   AST U/L 27 30 47*   ALT U/L 43 44 67   ALK PHOS U/L 29* 30* 49   TOTAL PROTEIN g/dL 6 7 6 3* 8 0   ALBUMIN g/dL 3 3* 3 6 4 4   TOTAL BILIRUBIN mg/dL 0 78 0 95 0 76         Results from last 7 days   Lab Units 09/27/22  0507 09/26/22  0749 09/25/22  2333   GLUCOSE RANDOM mg/dL 99 129 101     Results from last 7 days   Lab Units 09/26/22  0015   PROTIME seconds 16 5*   INR  1 33*   PTT seconds 26     Results from last 7 days   Lab Units 09/26/22  0433   GRAM STAIN RESULT  2+ Polys  No bacteria seen               ED Treatment:   Medication Administration from 09/25/2022 2123 to 09/26/2022 0318       Date/Time Order Dose Route Action     09/25/2022 2333 sodium chloride 0 9 % bolus 1,000 mL 1,000 mL Intravenous New Bag     09/25/2022 2333 ondansetron (ZOFRAN) injection 4 mg 4 mg Intravenous Given     09/25/2022 2334 ketorolac (TORADOL) injection 15 mg 15 mg Intravenous Given     09/26/2022 0057 iohexol (OMNIPAQUE) 350 MG/ML injection (SINGLE-DOSE) 100 mL 100 mL Intravenous Given     09/26/2022 0217 lactated ringers bolus 1,000 mL 1,000 mL Intravenous New Bag        Past Medical History:   Diagnosis Date    Asthma     Bronchitis  COPD (chronic obstructive pulmonary disease) (HCC)     Hepatitis C     Pneumonia      Present on Admission:   Perforated rectum ([facility] [address] )      Admitting Diagnosis: Rectal bleeding [K62 5]  Abdominal pain [R10 9]  Perforated bowel ([facility] [address] ) [K63 1]  Age/Sex: 55 y o  male  Admission Orders:  Scheduled Medications:  acetaminophen, 650 mg, Oral, Q6H  cefazolin, 2,000 mg, Intravenous, Q8H  enoxaparin, 40 mg, Subcutaneous, Daily  gabapentin, 100 mg, Oral, TID  lidocaine, 2 patch, Topical, Daily  methocarbamol, 500 mg, Oral, Q6H ESCOBAR  metroNIDAZOLE, 500 mg, Intravenous, Q8H  nicotine, 1 patch, Transdermal, Daily      Continuous IV Infusions:  lactated ringers, 125 mL/hr, Intravenous, Continuous      PRN Meds:  HYDROmorphone, 0 5 mg, Intravenous, Q3H PRN  ondansetron, 4 mg, Intravenous, Q6H PRN - x 1 9/26  oxyCODONE, 10 mg, Oral, Q4H PRN  oxyCODONE, 5 mg, Oral, Q4H PRN - x 1 9/26    Critical Care and then to Level 2 SD on 9/26  IV fluids  Up as adalberto   I&O q 4 hr   Transfusion parameters   Consult wound care nurse   IP CONSULT TO ACUTE CARE SURGERY    Network Utilization Review Department  ATTENTION: Please call with any questions or concerns to [phone] and carefully listen to the prompts so that you are directed to the right person  All voicemails are confidential   Deangelo Delude all requests for admission clinical reviews, approved or denied determinations and any other requests to dedicated fax number below belonging to the campus where the patient is receiving treatment   List of dedicated fax numbers for the Facilities:  [address] DENIALS (Administrative/Medical Necessity) [phone]   [address] [place] [address] (Maternity/NICU/Pediatrics) 261 [facility],[address] [place] [address] [place] [address] [facility] [place] [facility] [address] [id number] [place] [address] [address] [facility] [place] [address] [phone]

## 2022-09-27 NOTE — TELEPHONE ENCOUNTER
S/P = EXP  LAP / COLON RESECTION / COLOSTOMY LOOP = 9/26/2022    Unable to reach patient, he is still in-patient  Path pending

## 2022-09-27 NOTE — DISCHARGE INSTR - OTHER ORDERS
Ostomy Care: In the hospital you were using a Convatec one piece cut-to-fit drainable pouch  Your stoma measured 2 1/2 x 2 1/4 inches on 9/29/22 (pattern in your supply bag)  Measure your stoma at least weekly, as it will continue to shrink for upto 6-8 weeks  While in the hospital you were using the Convatec one piece cut-to-fit 4 inch pouch  Pouch change:  remove pouch using push/pull method  Cleanse stoma and peristomal skin with warm water only, pat dry  Apply dressing to incision--Maxorb silver, then tegaderm  Measure stoma and cut pouch barrier to appropriate size  Apply skin prep to peristomal skin  Place Hugo ring around edge of stoma closest to incision  Apply pouch  Hold gentle pressure with warm hand for 2 minutes to help with pouch adherence  Online Ostomy Resources:   [email or web address]   Www [email or web address]   Www [email or web address] us   Www [email or web address]

## 2022-09-27 NOTE — PLAN OF CARE
Problem: Potential for Falls  Goal: Patient will remain free of falls  Description: INTERVENTIONS:  - Educate patient/family on patient safety including physical limitations  - Instruct patient to call for assistance with activity   - Consult OT/PT to assist with strengthening/mobility   - Keep Call bell within reach  - Keep bed low and locked with side rails adjusted as appropriate  - Keep care items and personal belongings within reach  - Initiate and maintain comfort rounds  - Make Fall Risk Sign visible to staff  - Apply yellow socks and bracelet for high fall risk patients  - Consider moving patient to room near nurses station  Outcome: Progressing     Problem: PAIN - ADULT  Goal: Verbalizes/displays adequate comfort level or baseline comfort level  Description: Interventions:  - Encourage patient to monitor pain and request assistance  - Assess pain using appropriate pain scale  - Administer analgesics based on type and severity of pain and evaluate response  - Implement non-pharmacological measures as appropriate and evaluate response  - Consider cultural and social influences on pain and pain management  - Notify physician/advanced practitioner if interventions unsuccessful or patient reports new pain  Outcome: Progressing     Problem: DISCHARGE PLANNING  Goal: Discharge to home or other facility with appropriate resources  Description: INTERVENTIONS:  - Identify barriers to discharge w/patient and caregiver  - Arrange for needed discharge resources and transportation as appropriate  - Identify discharge learning needs (meds, wound care, etc )  - Arrange for interpretive services to assist at discharge as needed  - Refer to Case Management Department for coordinating discharge planning if the patient needs post-hospital services based on physician/advanced practitioner order or complex needs related to functional status, cognitive ability, or social support system  Outcome: Progressing     Problem: GASTROINTESTINAL - ADULT  Goal: Minimal or absence of nausea and/or vomiting  Description: INTERVENTIONS:  - Administer IV fluids if ordered to ensure adequate hydration  - Maintain NPO status until nausea and vomiting are resolved  - Nasogastric tube if ordered  - Administer ordered antiemetic medications as needed  - Provide nonpharmacologic comfort measures as appropriate  - Advance diet as tolerated, if ordered  - Consider nutrition services referral to assist patient with adequate nutrition and appropriate food choices  Outcome: Progressing  Goal: Maintains or returns to baseline bowel function  Description: INTERVENTIONS:  - Assess bowel function  - Encourage oral fluids to ensure adequate hydration  - Administer IV fluids if ordered to ensure adequate hydration  - Administer ordered medications as needed  - Encourage mobilization and activity  - Consider nutritional services referral to assist patient with adequate nutrition and appropriate food choices  Outcome: Progressing  Goal: Maintains adequate nutritional intake  Description: INTERVENTIONS:  - Monitor percentage of each meal consumed  - Identify factors contributing to decreased intake, treat as appropriate  - Assist with meals as needed  - Monitor I&O, weight, and lab values if indicated  - Obtain nutrition services referral as needed  Outcome: Progressing     Problem: GENITOURINARY - ADULT  Goal: Maintains or returns to baseline urinary function  Description: INTERVENTIONS:  - Assess urinary function  - Encourage oral fluids to ensure adequate hydration if ordered  - Administer IV fluids as ordered to ensure adequate hydration  - Administer ordered medications as needed  - Offer frequent toileting  - Follow urinary retention protocol if ordered  Outcome: Progressing  Goal: Absence of urinary retention  Description: INTERVENTIONS:  - Assess patients ability to void and empty bladder  - Monitor I/O  - Bladder scan as needed  - Discuss with physician/AP medications to alleviate retention as needed  - Discuss catheterization for long term situations as appropriate  Outcome: Progressing  Goal: Urinary catheter remains patent  Description: INTERVENTIONS:  - Assess patency of urinary catheter  - If patient has a chronic montejo, consider changing catheter if non-functioning  - Follow guidelines for intermittent irrigation of non-functioning urinary catheter  Outcome: Progressing     Problem: SKIN/TISSUE INTEGRITY - ADULT  Goal: Skin Integrity remains intact(Skin Breakdown Prevention)  Description: Assess:  -Perform Matthew assessment every shift  -Clean and moisturize skin every shift  -Inspect skin when repositioning, toileting, and assisting with ADLS  -Assess extremities for adequate circulation and sensation     Bed Management:  -Have minimal linens on bed & keep smooth, unwrinkled  -Change linens as needed when moist or perspiring  -Avoid sitting or lying in one position for more than 2 hours while in bed  -Keep HOB at 30 degrees       Activity:  -Mobilize patient 3 times a day  -Encourage activity and walks on unit  -Encourage or provide ROM exercises   -Use appropriate equipment to lift or move patient in bed  -Instruct/ Assist with weight shifting every hour when out of bed in chair  -Consider limitation of chair time 2 hour intervals    Skin Care:  -Avoid use of baby powder, tape, friction and shearing, hot water or constrictive clothing  -Do not massage red bony areas    Next Steps:  -Teach patient strategies to minimize risks such as frequent movement   -Consider consults to  interdisciplinary teams such as nutrition  Outcome: Progressing  Goal: Incision(s), wounds(s) or drain site(s) healing without S/S of infection  Description: INTERVENTIONS  - Assess and document dressing, incision, wound bed, drain sites and surrounding tissue  - Provide patient and family education  - Perform skin care/dressing changes every shift  Outcome: Progressing Problem: HEMATOLOGIC - ADULT  Goal: Maintains hematologic stability  Description: INTERVENTIONS  - Assess for signs and symptoms of bleeding or hemorrhage  - Monitor labs  - Administer supportive blood products/factors as ordered and appropriate  Outcome: Progressing

## 2022-09-28 LAB
ANION GAP SERPL CALCULATED.3IONS-SCNC: 4 MMOL/L (ref 4–13)
BACTERIA SPEC ANAEROBE CULT: NORMAL
BASOPHILS # BLD AUTO: 0.03 THOUSANDS/ΜL (ref 0–0.1)
BASOPHILS NFR BLD AUTO: 0 % (ref 0–1)
BUN SERPL-MCNC: 10 MG/DL (ref 5–25)
CALCIUM SERPL-MCNC: 8.5 MG/DL (ref 8.3–10.1)
CHLORIDE SERPL-SCNC: 109 MMOL/L (ref 96–108)
CO2 SERPL-SCNC: 28 MMOL/L (ref 21–32)
CREAT SERPL-MCNC: 0.72 MG/DL (ref 0.6–1.3)
EOSINOPHIL # BLD AUTO: 0.38 THOUSAND/ΜL (ref 0–0.61)
EOSINOPHIL NFR BLD AUTO: 5 % (ref 0–6)
ERYTHROCYTE [DISTWIDTH] IN BLOOD BY AUTOMATED COUNT: 12.2 % (ref 11.6–15.1)
GFR SERPL CREATININE-BSD FRML MDRD: 105 ML/MIN/1.73SQ M
GLUCOSE SERPL-MCNC: 93 MG/DL (ref 65–140)
HCT VFR BLD AUTO: 33.5 % (ref 36.5–49.3)
HGB BLD-MCNC: 11.5 G/DL (ref 12–17)
IMM GRANULOCYTES # BLD AUTO: 0.04 THOUSAND/UL (ref 0–0.2)
IMM GRANULOCYTES NFR BLD AUTO: 1 % (ref 0–2)
LYMPHOCYTES # BLD AUTO: 0.77 THOUSANDS/ΜL (ref 0.6–4.47)
LYMPHOCYTES NFR BLD AUTO: 9 % (ref 14–44)
MCH RBC QN AUTO: 34 PG (ref 26.8–34.3)
MCHC RBC AUTO-ENTMCNC: 34.3 G/DL (ref 31.4–37.4)
MCV RBC AUTO: 99 FL (ref 82–98)
MONOCYTES # BLD AUTO: 0.59 THOUSAND/ΜL (ref 0.17–1.22)
MONOCYTES NFR BLD AUTO: 7 % (ref 4–12)
NEUTROPHILS # BLD AUTO: 6.47 THOUSANDS/ΜL (ref 1.85–7.62)
NEUTS SEG NFR BLD AUTO: 78 % (ref 43–75)
NRBC BLD AUTO-RTO: 0 /100 WBCS
PLATELET # BLD AUTO: 142 THOUSANDS/UL (ref 149–390)
PMV BLD AUTO: 10.3 FL (ref 8.9–12.7)
POTASSIUM SERPL-SCNC: 4.1 MMOL/L (ref 3.5–5.3)
RBC # BLD AUTO: 3.38 MILLION/UL (ref 3.88–5.62)
SODIUM SERPL-SCNC: 141 MMOL/L (ref 135–147)
WBC # BLD AUTO: 8.28 THOUSAND/UL (ref 4.31–10.16)

## 2022-09-28 PROCEDURE — 80048 BASIC METABOLIC PNL TOTAL CA: CPT | Performed by: SURGERY

## 2022-09-28 PROCEDURE — 99024 POSTOP FOLLOW-UP VISIT: CPT | Performed by: SURGERY

## 2022-09-28 PROCEDURE — 85025 COMPLETE CBC W/AUTO DIFF WBC: CPT | Performed by: SURGERY

## 2022-09-28 RX ORDER — DEXTROSE, SODIUM CHLORIDE, AND POTASSIUM CHLORIDE 5; .45; .15 G/100ML; G/100ML; G/100ML
50 INJECTION INTRAVENOUS CONTINUOUS
Status: DISCONTINUED | OUTPATIENT
Start: 2022-09-28 | End: 2022-09-28

## 2022-09-28 RX ADMIN — SODIUM CHLORIDE, SODIUM LACTATE, POTASSIUM CHLORIDE, AND CALCIUM CHLORIDE 125 ML/HR: .6; .31; .03; .02 INJECTION, SOLUTION INTRAVENOUS at 02:49

## 2022-09-28 RX ADMIN — METHOCARBAMOL 500 MG: 500 TABLET ORAL at 12:00

## 2022-09-28 RX ADMIN — ACETAMINOPHEN 650 MG: 325 TABLET ORAL at 17:13

## 2022-09-28 RX ADMIN — ENOXAPARIN SODIUM 40 MG: 40 INJECTION SUBCUTANEOUS at 09:34

## 2022-09-28 RX ADMIN — GABAPENTIN 100 MG: 100 CAPSULE ORAL at 09:33

## 2022-09-28 RX ADMIN — GABAPENTIN 100 MG: 100 CAPSULE ORAL at 22:44

## 2022-09-28 RX ADMIN — METRONIDAZOLE 500 MG: 500 INJECTION, SOLUTION INTRAVENOUS at 10:07

## 2022-09-28 RX ADMIN — ACETAMINOPHEN 650 MG: 325 TABLET ORAL at 06:17

## 2022-09-28 RX ADMIN — METHOCARBAMOL 500 MG: 500 TABLET ORAL at 22:44

## 2022-09-28 RX ADMIN — ACETAMINOPHEN 650 MG: 325 TABLET ORAL at 12:00

## 2022-09-28 RX ADMIN — GABAPENTIN 100 MG: 100 CAPSULE ORAL at 16:22

## 2022-09-28 RX ADMIN — METRONIDAZOLE 500 MG: 500 INJECTION, SOLUTION INTRAVENOUS at 17:15

## 2022-09-28 RX ADMIN — METHOCARBAMOL 500 MG: 500 TABLET ORAL at 06:17

## 2022-09-28 RX ADMIN — CEFAZOLIN SODIUM 2000 MG: 2 SOLUTION INTRAVENOUS at 09:34

## 2022-09-28 RX ADMIN — ACETAMINOPHEN 650 MG: 325 TABLET ORAL at 22:43

## 2022-09-28 RX ADMIN — METHOCARBAMOL 500 MG: 500 TABLET ORAL at 01:15

## 2022-09-28 RX ADMIN — CEFAZOLIN SODIUM 2000 MG: 2 SOLUTION INTRAVENOUS at 17:09

## 2022-09-28 RX ADMIN — METHOCARBAMOL 500 MG: 500 TABLET ORAL at 17:13

## 2022-09-28 RX ADMIN — CEFAZOLIN SODIUM 2000 MG: 2 SOLUTION INTRAVENOUS at 01:45

## 2022-09-28 RX ADMIN — METRONIDAZOLE 500 MG: 500 INJECTION, SOLUTION INTRAVENOUS at 02:49

## 2022-09-28 NOTE — PLAN OF CARE
Problem: Potential for Falls  Goal: Patient will remain free of falls  Description: INTERVENTIONS:  - Educate patient/family on patient safety including physical limitations  - Instruct patient to call for assistance with activity   - Consult OT/PT to assist with strengthening/mobility   - Keep Call bell within reach  - Keep bed low and locked with side rails adjusted as appropriate  - Keep care items and personal belongings within reach  - Initiate and maintain comfort rounds  - Make Fall Risk Sign visible to staff  - Offer Toileting every  Hours, in advance of need  - Initiate/Maintain alarm  - Obtain necessary fall risk management equipment:   - Apply yellow socks and bracelet for high fall risk patients  - Consider moving patient to room near nurses station  Outcome: Progressing     Problem: PAIN - ADULT  Goal: Verbalizes/displays adequate comfort level or baseline comfort level  Description: Interventions:  - Encourage patient to monitor pain and request assistance  - Assess pain using appropriate pain scale  - Administer analgesics based on type and severity of pain and evaluate response  - Implement non-pharmacological measures as appropriate and evaluate response  - Consider cultural and social influences on pain and pain management  - Notify physician/advanced practitioner if interventions unsuccessful or patient reports new pain  Outcome: Progressing     Problem: DISCHARGE PLANNING  Goal: Discharge to home or other facility with appropriate resources  Description: INTERVENTIONS:  - Identify barriers to discharge w/patient and caregiver  - Arrange for needed discharge resources and transportation as appropriate  - Identify discharge learning needs (meds, wound care, etc )  - Arrange for interpretive services to assist at discharge as needed  - Refer to Case Management Department for coordinating discharge planning if the patient needs post-hospital services based on physician/advanced practitioner order or complex needs related to functional status, cognitive ability, or social support system  Outcome: Progressing     Problem: GASTROINTESTINAL - ADULT  Goal: Minimal or absence of nausea and/or vomiting  Description: INTERVENTIONS:  - Administer IV fluids if ordered to ensure adequate hydration  - Maintain NPO status until nausea and vomiting are resolved  - Nasogastric tube if ordered  - Administer ordered antiemetic medications as needed  - Provide nonpharmacologic comfort measures as appropriate  - Advance diet as tolerated, if ordered  - Consider nutrition services referral to assist patient with adequate nutrition and appropriate food choices  Outcome: Progressing  Goal: Maintains or returns to baseline bowel function  Description: INTERVENTIONS:  - Assess bowel function  - Encourage oral fluids to ensure adequate hydration  - Administer IV fluids if ordered to ensure adequate hydration  - Administer ordered medications as needed  - Encourage mobilization and activity  - Consider nutritional services referral to assist patient with adequate nutrition and appropriate food choices  Outcome: Progressing  Goal: Maintains adequate nutritional intake  Description: INTERVENTIONS:  - Monitor percentage of each meal consumed  - Identify factors contributing to decreased intake, treat as appropriate  - Assist with meals as needed  - Monitor I&O, weight, and lab values if indicated  - Obtain nutrition services referral as needed  Outcome: Progressing     Problem: GENITOURINARY - ADULT  Goal: Maintains or returns to baseline urinary function  Description: INTERVENTIONS:  - Assess urinary function  - Encourage oral fluids to ensure adequate hydration if ordered  - Administer IV fluids as ordered to ensure adequate hydration  - Administer ordered medications as needed  - Offer frequent toileting  - Follow urinary retention protocol if ordered  Outcome: Progressing  Goal: Absence of urinary retention  Description: INTERVENTIONS:  - Assess patients ability to void and empty bladder  - Monitor I/O  - Bladder scan as needed  - Discuss with physician/AP medications to alleviate retention as needed  - Discuss catheterization for long term situations as appropriate  Outcome: Progressing  Goal: Urinary catheter remains patent  Description: INTERVENTIONS:  - Assess patency of urinary catheter  - If patient has a chronic montejo, consider changing catheter if non-functioning  - Follow guidelines for intermittent irrigation of non-functioning urinary catheter  Outcome: Progressing     Problem: SKIN/TISSUE INTEGRITY - ADULT  Goal: Skin Integrity remains intact(Skin Breakdown Prevention)  Description: Assess:  -Perform Matthew assessment every   -Clean and moisturize skin every   -Inspect skin when repositioning, toileting, and assisting with ADLS  -Assess under medical devices such as  every   -Assess extremities for adequate circulation and sensation     Bed Management:  -Have minimal linens on bed & keep smooth, unwrinkled  -Change linens as needed when moist or perspiring  -Avoid sitting or lying in one position for more than  hours while in bed  -Keep HOB at degrees     Toileting:  -Offer bedside commode  -Assess for incontinence every   -Use incontinent care products after each incontinent episode such as     Activity:  -Mobilize patient  times a day  -Encourage activity and walks on unit  -Encourage or provide ROM exercises   -Turn and reposition patient every  Hours  -Use appropriate equipment to lift or move patient in bed  -Instruct/ Assist with weight shifting every  when out of bed in chair  -Consider limitation of chair time  hour intervals    Skin Care:  -Avoid use of baby powder, tape, friction and shearing, hot water or constrictive clothing  -Relieve pressure over bony prominences using   -Do not massage red bony areas    Next Steps:  -Teach patient strategies to minimize risks such as    -Consider consults to interdisciplinary teams such as   Outcome: Progressing  Goal: Incision(s), wounds(s) or drain site(s) healing without S/S of infection  Description: INTERVENTIONS  - Assess and document dressing, incision, wound bed, drain sites and surrounding tissue  - Provide patient and family education  - Perform skin care/dressing changes ever  Outcome: Progressing     Problem: HEMATOLOGIC - ADULT  Goal: Maintains hematologic stability  Description: INTERVENTIONS  - Assess for signs and symptoms of bleeding or hemorrhage  - Monitor labs  - Administer supportive blood products/factors as ordered and appropriate  Outcome: Progressing     Problem: Nutrition/Hydration-ADULT  Goal: Nutrient/Hydration intake appropriate for improving, restoring or maintaining nutritional needs  Description: Monitor and assess patient's nutrition/hydration status for malnutrition  Collaborate with interdisciplinary team and initiate plan and interventions as ordered  Monitor patient's weight and dietary intake as ordered or per policy  Utilize nutrition screening tool and intervene as necessary  Determine patient's food preferences and provide high-protein, high-caloric foods as appropriate       INTERVENTIONS:  - Monitor oral intake, urinary output, labs, and treatment plans  - Assess nutrition and hydration status and recommend course of action  - Evaluate amount of meals eaten  - Assist patient with eating if necessary   - Allow adequate time for meals  - Recommend/ encourage appropriate diets, oral nutritional supplements, and vitamin/mineral supplements  - Order, calculate, and assess calorie counts as needed  - Recommend, monitor, and adjust tube feedings and TPN/PPN based on assessed needs  - Assess need for intravenous fluids  - Provide specific nutrition/hydration education as appropriate  - Include patient/family/caregiver in decisions related to nutrition  Outcome: Progressing

## 2022-09-28 NOTE — PROGRESS NOTES
Progress Note - General Surgery  : HERLINDA [facility] Surgery Resident on [address] B[place] 55 y o  male MRN: [id number]  Unit/Bed#: E4 -01 Encounter: [id number]      Assessment:  55 y o  male s/p 9/26 sigmoidectomy w/ loop colostomy for perforation       Plan:  CLD, likely advance today  IVF  Will discuss Saleem removal plan, likely stays today  Encourage OOB / Ambulation  Lovenox dvt ppx      Subjective: No acute complaints      Objective:     Physical Exam:  GEN: NAD   Ab: Soft, appropriately mildly tender, CDI, stoma pink with brown liquid feculent output  Lung: Normal effort   CV: RRR   Extrem: No CCE   Neuro: A+Ox3       I/O       09/26 0701  09/27 0700 09/27 0701 09/28 0700    I V  (mL/kg) 1783 3 (21 8) 2427 1 (29 7)    Blood      IV Piggyback 180     Total Intake(mL/kg) 1963 3 (24) 2427 1 (29 7)    Urine (mL/kg/hr) 3600 (1 8) 2400 (2 1)    Stool  10    Blood      Total Output 3600 2410    Net -1636 7 +17 1                Lab, Imaging and other studies: I have personally reviewed pertinent reports    , CBC with diff:   Lab Results   Component Value Date    WBC 9 65 09/27/2022    HGB 11 8 (L) 09/27/2022    HCT 35 3 (L) 09/27/2022     (H) 09/27/2022     09/27/2022    MCH 33 8 09/27/2022    MCHC 33 4 09/27/2022    RDW 12 6 09/27/2022    MPV 10 8 09/27/2022    NRBC 0 09/27/2022   , BMP/CMP:   Lab Results   Component Value Date    SODIUM 144 09/27/2022    K 4 0 09/27/2022     (H) 09/27/2022    CO2 26 09/27/2022    BUN 14 09/27/2022    CREATININE 0 84 09/27/2022    CALCIUM 8 7 09/27/2022    AST 27 09/27/2022    ALT 43 09/27/2022    ALKPHOS 29 (L) 09/27/2022    EGFR 98 09/27/2022         VTE Pharmacologic Prophylaxis: Enoxaparin (Lovenox)        Jonathan Enriquez MD  9/27/2022 9:09 PM

## 2022-09-28 NOTE — PLAN OF CARE
Problem: Potential for Falls  Goal: Patient will remain free of falls  Description: INTERVENTIONS:  - Educate patient/family on patient safety including physical limitations  - Instruct patient to call for assistance with activity   - Consult OT/PT to assist with strengthening/mobility   - Keep Call bell within reach  - Keep bed low and locked with side rails adjusted as appropriate  - Keep care items and personal belongings within reach  - Initiate and maintain comfort rounds  - Make Fall Risk Sign visible to staff  - Offer Toileting every  Hours, in advance of need  - Initiate/Maintain alarm  - Obtain necessary fall risk management equipment:   - Apply yellow socks and bracelet for high fall risk patients  - Consider moving patient to room near nurses station  Outcome: Progressing     Problem: PAIN - ADULT  Goal: Verbalizes/displays adequate comfort level or baseline comfort level  Description: Interventions:  - Encourage patient to monitor pain and request assistance  - Assess pain using appropriate pain scale  - Administer analgesics based on type and severity of pain and evaluate response  - Implement non-pharmacological measures as appropriate and evaluate response  - Consider cultural and social influences on pain and pain management  - Notify physician/advanced practitioner if interventions unsuccessful or patient reports new pain  Outcome: Progressing     Problem: DISCHARGE PLANNING  Goal: Discharge to home or other facility with appropriate resources  Description: INTERVENTIONS:  - Identify barriers to discharge w/patient and caregiver  - Arrange for needed discharge resources and transportation as appropriate  - Identify discharge learning needs (meds, wound care, etc )  - Arrange for interpretive services to assist at discharge as needed  - Refer to Case Management Department for coordinating discharge planning if the patient needs post-hospital services based on physician/advanced practitioner order or complex needs related to functional status, cognitive ability, or social support system  Outcome: Progressing     Problem: GASTROINTESTINAL - ADULT  Goal: Minimal or absence of nausea and/or vomiting  Description: INTERVENTIONS:  - Administer IV fluids if ordered to ensure adequate hydration  - Maintain NPO status until nausea and vomiting are resolved  - Nasogastric tube if ordered  - Administer ordered antiemetic medications as needed  - Provide nonpharmacologic comfort measures as appropriate  - Advance diet as tolerated, if ordered  - Consider nutrition services referral to assist patient with adequate nutrition and appropriate food choices  Outcome: Progressing  Goal: Maintains or returns to baseline bowel function  Description: INTERVENTIONS:  - Assess bowel function  - Encourage oral fluids to ensure adequate hydration  - Administer IV fluids if ordered to ensure adequate hydration  - Administer ordered medications as needed  - Encourage mobilization and activity  - Consider nutritional services referral to assist patient with adequate nutrition and appropriate food choices  Outcome: Progressing  Goal: Maintains adequate nutritional intake  Description: INTERVENTIONS:  - Monitor percentage of each meal consumed  - Identify factors contributing to decreased intake, treat as appropriate  - Assist with meals as needed  - Monitor I&O, weight, and lab values if indicated  - Obtain nutrition services referral as needed  Outcome: Progressing     Problem: GENITOURINARY - ADULT  Goal: Maintains or returns to baseline urinary function  Description: INTERVENTIONS:  - Assess urinary function  - Encourage oral fluids to ensure adequate hydration if ordered  - Administer IV fluids as ordered to ensure adequate hydration  - Administer ordered medications as needed  - Offer frequent toileting  - Follow urinary retention protocol if ordered  Outcome: Progressing  Goal: Absence of urinary retention  Description: INTERVENTIONS:  - Assess patients ability to void and empty bladder  - Monitor I/O  - Bladder scan as needed  - Discuss with physician/AP medications to alleviate retention as needed  - Discuss catheterization for long term situations as appropriate  Outcome: Progressing  Goal: Urinary catheter remains patent  Description: INTERVENTIONS:  - Assess patency of urinary catheter  - If patient has a chronic montejo, consider changing catheter if non-functioning  - Follow guidelines for intermittent irrigation of non-functioning urinary catheter  Outcome: Progressing     Problem: SKIN/TISSUE INTEGRITY - ADULT  Goal: Skin Integrity remains intact(Skin Breakdown Prevention)  Description: Assess:  -Perform Matthew assessment every   -Clean and moisturize skin every   -Inspect skin when repositioning, toileting, and assisting with ADLS  -Assess under medical devices such as  every   -Assess extremities for adequate circulation and sensation     Bed Management:  -Have minimal linens on bed & keep smooth, unwrinkled  -Change linens as needed when moist or perspiring  -Avoid sitting or lying in one position for more than  hours while in bed  -Keep HOB at degrees     Toileting:  -Offer bedside commode  -Assess for incontinence every   -Use incontinent care products after each incontinent episode such as     Activity:  -Mobilize patient  times a day  -Encourage activity and walks on unit  -Encourage or provide ROM exercises   -Turn and reposition patient every  Hours  -Use appropriate equipment to lift or move patient in bed  -Instruct/ Assist with weight shifting every  when out of bed in chair  -Consider limitation of chair time  hour intervals    Skin Care:  -Avoid use of baby powder, tape, friction and shearing, hot water or constrictive clothing  -Relieve pressure over bony prominences using   -Do not massage red bony areas    Next Steps:  -Teach patient strategies to minimize risks such as    -Consider consults to interdisciplinary teams such as   Outcome: Progressing  Goal: Incision(s), wounds(s) or drain site(s) healing without S/S of infection  Description: INTERVENTIONS  - Assess and document dressing, incision, wound bed, drain sites and surrounding tissue  - Provide patient and family education  - Perform skin care/dressing changes every  Outcome: Progressing     Problem: HEMATOLOGIC - ADULT  Goal: Maintains hematologic stability  Description: INTERVENTIONS  - Assess for signs and symptoms of bleeding or hemorrhage  - Monitor labs  - Administer supportive blood products/factors as ordered and appropriate  Outcome: Progressing     Problem: Nutrition/Hydration-ADULT  Goal: Nutrient/Hydration intake appropriate for improving, restoring or maintaining nutritional needs  Description: Monitor and assess patient's nutrition/hydration status for malnutrition  Collaborate with interdisciplinary team and initiate plan and interventions as ordered  Monitor patient's weight and dietary intake as ordered or per policy  Utilize nutrition screening tool and intervene as necessary  Determine patient's food preferences and provide high-protein, high-caloric foods as appropriate       INTERVENTIONS:  - Monitor oral intake, urinary output, labs, and treatment plans  - Assess nutrition and hydration status and recommend course of action  - Evaluate amount of meals eaten  - Assist patient with eating if necessary   - Allow adequate time for meals  - Recommend/ encourage appropriate diets, oral nutritional supplements, and vitamin/mineral supplements  - Order, calculate, and assess calorie counts as needed  - Recommend, monitor, and adjust tube feedings and TPN/PPN based on assessed needs  - Assess need for intravenous fluids  - Provide specific nutrition/hydration education as appropriate  - Include patient/family/caregiver in decisions related to nutrition  Outcome: Progressing     Problem: COPING  Goal: Pt/Family able to verbalize concerns and demonstrate effective coping strategies  Description: INTERVENTIONS:  - Assist patient/family to identify coping skills, available support systems and cultural and spiritual values  - Provide emotional support, including active listening and acknowledgement of concerns of patient and caregivers  - Reduce environmental stimuli, as able  - Provide patient education  - Assess for spiritual pain/suffering and initiate spiritual care, including notification of Pastoral Care or nohemi based community as needed  - Assess effectiveness of coping strategies  Outcome: Progressing     Problem: CHANGE IN BODY IMAGE  Goal: Pt/Family communicate acceptance of loss or change in body image and expresses psychological comfort and peace  Description: INTERVENTIONS:  - Assess patient/family anxiety and grief process related to change in body image, loss of functional status and loss of sense of self  - Assess patient/family's coping skills and provide emotional, spiritual and psychosocial support  - Provide information about the patient's health status with consideration of family and cultural values  - Communicate willingness to discuss loss and facilitate grief process with patient/family as appropriate  - Emphasize sustaining relationships within family system and community, or nohemi/spiritual traditions  - Refer to community support groups as appropriate  - Initiate Spiritual Care, Pastoral care or other ancillary consults as needed  Outcome: Progressing

## 2022-09-29 VITALS
RESPIRATION RATE: 18 BRPM | HEART RATE: 73 BPM | BODY MASS INDEX: 25.22 KG/M2 | SYSTOLIC BLOOD PRESSURE: 128 MMHG | DIASTOLIC BLOOD PRESSURE: 79 MMHG | TEMPERATURE: 98.2 F | OXYGEN SATURATION: 92 % | WEIGHT: 180.12 LBS | HEIGHT: 71 IN

## 2022-09-29 LAB
BACTERIA SPEC BFLD CULT: ABNORMAL
BACTERIA SPEC BFLD CULT: ABNORMAL
GRAM STN SPEC: ABNORMAL
GRAM STN SPEC: ABNORMAL

## 2022-09-29 PROCEDURE — NC001 PR NO CHARGE: Performed by: PHYSICIAN ASSISTANT

## 2022-09-29 PROCEDURE — 99024 POSTOP FOLLOW-UP VISIT: CPT | Performed by: SURGERY

## 2022-09-29 RX ORDER — GABAPENTIN 100 MG/1
100 CAPSULE ORAL 3 TIMES DAILY
Qty: 21 CAPSULE | Refills: 0 | Status: SHIPPED | OUTPATIENT
Start: 2022-09-29

## 2022-09-29 RX ORDER — ACETAMINOPHEN 325 MG/1
650 TABLET ORAL EVERY 6 HOURS PRN
Refills: 0
Start: 2022-09-29

## 2022-09-29 RX ORDER — METHOCARBAMOL 500 MG/1
500 TABLET, FILM COATED ORAL EVERY 6 HOURS PRN
Qty: 20 TABLET | Refills: 0 | Status: SHIPPED | OUTPATIENT
Start: 2022-09-29

## 2022-09-29 RX ADMIN — ACETAMINOPHEN 650 MG: 325 TABLET ORAL at 06:33

## 2022-09-29 RX ADMIN — GABAPENTIN 100 MG: 100 CAPSULE ORAL at 08:53

## 2022-09-29 RX ADMIN — METRONIDAZOLE 500 MG: 500 INJECTION, SOLUTION INTRAVENOUS at 02:27

## 2022-09-29 RX ADMIN — METRONIDAZOLE 500 MG: 500 INJECTION, SOLUTION INTRAVENOUS at 08:57

## 2022-09-29 RX ADMIN — METHOCARBAMOL 500 MG: 500 TABLET ORAL at 06:33

## 2022-09-29 RX ADMIN — CEFAZOLIN SODIUM 2000 MG: 2 SOLUTION INTRAVENOUS at 08:57

## 2022-09-29 RX ADMIN — ENOXAPARIN SODIUM 40 MG: 40 INJECTION SUBCUTANEOUS at 08:53

## 2022-09-29 RX ADMIN — CEFAZOLIN SODIUM 2000 MG: 2 SOLUTION INTRAVENOUS at 02:02

## 2022-09-29 NOTE — PROGRESS NOTES
Progress Note - General Surgery   Gloria Palacio 65 y o  male MRN: [id number]  Unit/Bed#: E4 -01 Encounter: [id number]    Assessment:  Patient is a 65 y o  male w/ PMH of asthma, HCV, opiate abuse, w/ rectosigmoid perforation due to foreign body s/p ex lap, ESTUARDO, sigmoidectomy w/ loop colostomy 9/26     Ostomy:  Semi solid brown stool (patient has been emptying contents)    Plan:   d/c today   Soft diet   ABX   OOB ambulate   I/Os   Appreciate Wound/Ostomy nursing assistance   Please Tiger[employer] on call [facility] surgery resident or AP with any questions     Subjective/Objective     Subjective:   No acute events overnight  Pain controlled  Able to change ostomy on his own  Able to void on his own since Saleem was removed  Has been up out of bed  Tolerating diet  Had some difficulties with leaking at the medial side of the ostomy, but has worked with wound care and has ostomy paste to try and mitigate this  Pertinent review of systems as above  All other review of systems negative  Objective:    Blood pressure 139/72, pulse 77, temperature 99 1 °F (37 3 °C), temperature source Temporal, resp  rate 18, height 5' 11" (1 803 m), weight 81 7 kg (180 lb 1 9 oz), SpO2 91 %  ,Body mass index is 25 12 kg/m²  Intake/Output Summary (Last 24 hours) at 9/29/2022 0536  Last data filed at 9/28/2022 1202  Gross per 24 hour   Intake --   Output 1650 ml   Net -1650 ml       Invasive Devices  Report    Peripheral Intravenous Line  Duration           Peripheral IV 09/25/22 Right Antecubital 3 days    Peripheral IV 09/26/22 Left Forearm 3 days          Drain  Duration           Colostomy Loop LLQ 2 days                Physical Exam:   Gen:  NAD  HEENT: NCAT  MMM  CV: well perfused, pulses palpable  Lungs: Normal respiratory effort  Abd: soft, nt/nd, incisions cdi, ostomy intact  Skin: warm/ dry  Extremities: no peripheral edema, no cyanosis  Neuro:  AxO x3      Results from last 7 days   Lab Units 09/28/22  9983 09/27/22  0507 09/26/22  0749   WBC Thousand/uL 8 28 9 65 8 70   HEMOGLOBIN g/dL 11 5* 11 8* 12 6   HEMATOCRIT % 33 5* 35 3* 37 7   PLATELETS Thousands/uL 142* 154 146*     Results from last 7 days   Lab Units 09/28/22  0832 09/27/22  0507 09/26/22  0749   POTASSIUM mmol/L 4 1 4 0 4 5   CHLORIDE mmol/L 109* 110* 110*   CO2 mmol/L 28 26 22   BUN mg/dL 10 14 17   CREATININE mg/dL 0 72 0 84 0 91   CALCIUM mg/dL 8 5 8 7 7 8*     Results from last 7 days   Lab Units 09/26/22  0015   INR  1 33*   PTT seconds 26        I have personally reviewed pertinent films in PACS      Medications:   Scheduled Meds:  Current Facility-Administered Medications   Medication Dose Route Frequency Provider Last Rate    acetaminophen  650 mg Oral Q6H Rajiv Griffin MD      cefazolin  2,000 mg Intravenous Q8H Rajiv Griffin MD 2,000 mg (09/29/22 0202)    enoxaparin  40 mg Subcutaneous Daily aRjiv Griffin MD      gabapentin  100 mg Oral TID Rajiv Griffin MD      HYDROmorphone  0 5 mg Intravenous Q3H PRN Rajiv Griffin MD      lidocaine  2 patch Topical Daily Rajiv Griffin MD      methocarbamol  500 mg Oral Q6H [address] Rajiv Griffin MD      metroNIDAZOLE  500 mg Intravenous Q8H Rajiv Griffin  mg (09/29/22 0227)    nicotine  1 patch Transdermal Daily Rajiv Griffin MD      ondansetron  4 mg Intravenous Q6H PRN Rajiv Griffin MD      oxyCODONE  10 mg Oral Q4H PRN Rajiv Griffin MD      oxyCODONE  5 mg Oral Q4H PRN Rajiv Griffin MD       Continuous Infusions:   PRN Meds:  HYDROmorphone, 0 5 mg, Q3H PRN  ondansetron, 4 mg, Q6H PRN  oxyCODONE, 10 mg, Q4H PRN  oxyCODONE, 5 mg, Q4H PRN      VTE Pharmacologic Prophylaxis: Enoxaparin (Lovenox)  VTE Mechanical Prophylaxis: sequential compression device

## 2022-09-29 NOTE — PLAN OF CARE
Problem: Potential for Falls  Goal: Patient will remain free of falls  Description: INTERVENTIONS:  - Educate patient/family on patient safety including physical limitations  - Instruct patient to call for assistance with activity   - Consult OT/PT to assist with strengthening/mobility   - Keep Call bell within reach  - Keep bed low and locked with side rails adjusted as appropriate  - Keep care items and personal belongings within reach  - Initiate and maintain comfort rounds  - Make Fall Risk Sign visible to staff  - Apply yellow socks and bracelet for high fall risk patients  - Consider moving patient to room near nurses station  Outcome: Progressing     Problem: PAIN - ADULT  Goal: Verbalizes/displays adequate comfort level or baseline comfort level  Description: Interventions:  - Encourage patient to monitor pain and request assistance  - Assess pain using appropriate pain scale  - Administer analgesics based on type and severity of pain and evaluate response  - Implement non-pharmacological measures as appropriate and evaluate response  - Consider cultural and social influences on pain and pain management  - Notify physician/advanced practitioner if interventions unsuccessful or patient reports new pain  Outcome: Progressing     Problem: DISCHARGE PLANNING  Goal: Discharge to home or other facility with appropriate resources  Description: INTERVENTIONS:  - Identify barriers to discharge w/patient and caregiver  - Arrange for needed discharge resources and transportation as appropriate  - Identify discharge learning needs (meds, wound care, etc )  - Arrange for interpretive services to assist at discharge as needed  - Refer to Case Management Department for coordinating discharge planning if the patient needs post-hospital services based on physician/advanced practitioner order or complex needs related to functional status, cognitive ability, or social support system  Outcome: Progressing     Problem: GASTROINTESTINAL - ADULT  Goal: Minimal or absence of nausea and/or vomiting  Description: INTERVENTIONS:  - Administer IV fluids if ordered to ensure adequate hydration  - Maintain NPO status until nausea and vomiting are resolved  - Nasogastric tube if ordered  - Administer ordered antiemetic medications as needed  - Provide nonpharmacologic comfort measures as appropriate  - Advance diet as tolerated, if ordered  - Consider nutrition services referral to assist patient with adequate nutrition and appropriate food choices  Outcome: Progressing  Goal: Maintains or returns to baseline bowel function  Description: INTERVENTIONS:  - Assess bowel function  - Encourage oral fluids to ensure adequate hydration  - Administer IV fluids if ordered to ensure adequate hydration  - Administer ordered medications as needed  - Encourage mobilization and activity  - Consider nutritional services referral to assist patient with adequate nutrition and appropriate food choices  Outcome: Progressing  Goal: Maintains adequate nutritional intake  Description: INTERVENTIONS:  - Monitor percentage of each meal consumed  - Identify factors contributing to decreased intake, treat as appropriate  - Assist with meals as needed  - Monitor I&O, weight, and lab values if indicated  - Obtain nutrition services referral as needed  Outcome: Progressing     Problem: GENITOURINARY - ADULT  Goal: Maintains or returns to baseline urinary function  Description: INTERVENTIONS:  - Assess urinary function  - Encourage oral fluids to ensure adequate hydration if ordered  - Administer IV fluids as ordered to ensure adequate hydration  - Administer ordered medications as needed  - Offer frequent toileting  - Follow urinary retention protocol if ordered  Outcome: Progressing  Goal: Absence of urinary retention  Description: INTERVENTIONS:  - Assess patients ability to void and empty bladder  - Monitor I/O  - Bladder scan as needed  - Discuss with physician/AP medications to alleviate retention as needed  - Discuss catheterization for long term situations as appropriate  Outcome: Progressing  Goal: Urinary catheter remains patent  Description: INTERVENTIONS:  - Assess patency of urinary catheter  - If patient has a chronic montejo, consider changing catheter if non-functioning  - Follow guidelines for intermittent irrigation of non-functioning urinary catheter  Outcome: Progressing     Goal: Incision(s), wounds(s) or drain site(s) healing without S/S of infection  Description: INTERVENTIONS  - Assess and document dressing, incision, wound bed, drain sites and surrounding tissue  - Provide patient and family education  - Perform skin care/dressing changes every day  Outcome: Progressing     Problem: HEMATOLOGIC - ADULT  Goal: Maintains hematologic stability  Description: INTERVENTIONS  - Assess for signs and symptoms of bleeding or hemorrhage  - Monitor labs  - Administer supportive blood products/factors as ordered and appropriate  Outcome: Progressing     Problem: Nutrition/Hydration-ADULT  Goal: Nutrient/Hydration intake appropriate for improving, restoring or maintaining nutritional needs  Description: Monitor and assess patient's nutrition/hydration status for malnutrition  Collaborate with interdisciplinary team and initiate plan and interventions as ordered  Monitor patient's weight and dietary intake as ordered or per policy  Utilize nutrition screening tool and intervene as necessary  Determine patient's food preferences and provide high-protein, high-caloric foods as appropriate       INTERVENTIONS:  - Monitor oral intake, urinary output, labs, and treatment plans  - Assess nutrition and hydration status and recommend course of action  - Evaluate amount of meals eaten  - Assist patient with eating if necessary   - Allow adequate time for meals  - Recommend/ encourage appropriate diets, oral nutritional supplements, and vitamin/mineral supplements  - Order, calculate, and assess calorie counts as needed  - Recommend, monitor, and adjust tube feedings and TPN/PPN based on assessed needs  - Assess need for intravenous fluids  - Provide specific nutrition/hydration education as appropriate  - Include patient/family/caregiver in decisions related to nutrition  Outcome: Progressing     Problem: COPING  Goal: Pt/Family able to verbalize concerns and demonstrate effective coping strategies  Description: INTERVENTIONS:  - Assist patient/family to identify coping skills, available support systems and cultural and spiritual values  - Provide emotional support, including active listening and acknowledgement of concerns of patient and caregivers  - Reduce environmental stimuli, as able  - Provide patient education  - Assess for spiritual pain/suffering and initiate spiritual care, including notification of Pastoral Care or nohemi based community as needed  - Assess effectiveness of coping strategies  Outcome: Progressing     Problem: CHANGE IN BODY IMAGE  Goal: Pt/Family communicate acceptance of loss or change in body image and expresses psychological comfort and peace  Description: INTERVENTIONS:  - Assess patient/family anxiety and grief process related to change in body image, loss of functional status and loss of sense of self  - Assess patient/family's coping skills and provide emotional, spiritual and psychosocial support  - Provide information about the patient's health status with consideration of family and cultural values  - Communicate willingness to discuss loss and facilitate grief process with patient/family as appropriate  - Emphasize sustaining relationships within family system and community, or nohemi/spiritual traditions  - Refer to community support groups as appropriate  - Initiate Spiritual Care, Pastoral care or other ancillary consults as needed  Outcome: Progressing

## 2022-09-29 NOTE — CASE MANAGEMENT
Case Management Discharge Planning Note    Patient name Ema Summers  Location East 4 /E4  [id number]-* MRN [id number]  : 1965 Date 2022       Current Admission Date: 2022  Current Admission Diagnosis:Perforated rectum [facility])   Patient Active Problem List    Diagnosis Date Noted    Perforated bowel ([facility] [address] ) 2022    Perforated rectum ([facility] [address] ) 2022    Asthma     Hepatitis C     COPD (chronic obstructive pulmonary disease) ([facility] [address] )       LOS (days): 3  Geometric Mean LOS (GMLOS) (days): 10 30  Days to GMLOS:6 9     OBJECTIVE:  Risk of Unplanned Readmission Score: 7 99         Current admission status: Inpatient   Preferred Pharmacy:   [address] [place] [address] #[id number] [place] [address]  [address] [facility] [address]  Phone: [phone] Fax: [phone]    [facility], [address] [place] [address]  Phone: [phone] Fax: [phone]    Primary Care Provider: Jefferson Moore MD    Primary Insurance: [address]  Secondary Insurance:     DISCHARGE DETAILS:    Discharge planning discussed with[de-identified] Patient  Freedom of Choice: Yes  Comments - Freedom of Choice: Pt would like to go home  CM contacted family/caregiver?: No- see comments (pt declined)     Did patient/caregiver verbalize understanding of patient care needs?: Yes  Were patient/caregiver advised of the risks associated with not following Treatment Team discharge recommendations?: Yes         Requested  [employer] [address]         Is the patient interested in [address] at discharge?: Yes  [address] Moore requested[de-identified] [address] Name[de-identified] [facility] Provider[de-identified] Referring Provider  Home Health Services Needed[de-identified] Evaluate Functional Status and Safety, Gait/ADL Training, Strengthening/Theraputic Exercises to Improve Function  Homebound Criteria Met[de-identified] Requires the Assistance of Another Person for Safe Ambulation or to Leave the Home  Supporting Clincal Findings[de-identified] Limited Endurance                   Treatment Team Recommendation: Home with [address] [employer]  Discharge Destination Plan[de-identified] Home with [address] [employer]                                         Additional Comments: CM met w/ pt at bedside to discuss d/c planning  Pt would like VNA to come to home to help w/ his new ostomy bag  CM set up [facility] w/ pt for d/c  Pt confirmed he lives 2 blocks away and does not need a ride  Pt denied having any other questions or conerns at this time  CM to continue to follow pt care & d/c

## 2022-09-29 NOTE — PLAN OF CARE
Problem: Potential for Falls  Goal: Patient will remain free of falls  Description: INTERVENTIONS:  - Educate patient/family on patient safety including physical limitations  - Instruct patient to call for assistance with activity   - Consult OT/PT to assist with strengthening/mobility   - Keep Call bell within reach  - Keep bed low and locked with side rails adjusted as appropriate  - Keep care items and personal belongings within reach  - Initiate and maintain comfort rounds  - Make Fall Risk Sign visible to staff  - Apply yellow socks and bracelet for high fall risk patients  - Consider moving patient to room near nurses station  Outcome: Progressing     Problem: PAIN - ADULT  Goal: Verbalizes/displays adequate comfort level or baseline comfort level  Description: Interventions:  - Encourage patient to monitor pain and request assistance  - Assess pain using appropriate pain scale  - Administer analgesics based on type and severity of pain and evaluate response  - Implement non-pharmacological measures as appropriate and evaluate response  - Consider cultural and social influences on pain and pain management  - Notify physician/advanced practitioner if interventions unsuccessful or patient reports new pain  Outcome: Progressing     Problem: DISCHARGE PLANNING  Goal: Discharge to home or other facility with appropriate resources  Description: INTERVENTIONS:  - Identify barriers to discharge w/patient and caregiver  - Arrange for needed discharge resources and transportation as appropriate  - Identify discharge learning needs (meds, wound care, etc )  - Arrange for interpretive services to assist at discharge as needed  - Refer to Case Management Department for coordinating discharge planning if the patient needs post-hospital services based on physician/advanced practitioner order or complex needs related to functional status, cognitive ability, or social support system  Outcome: Progressing     Problem: GASTROINTESTINAL - ADULT  Goal: Minimal or absence of nausea and/or vomiting  Description: INTERVENTIONS:  - Administer IV fluids if ordered to ensure adequate hydration  - Maintain NPO status until nausea and vomiting are resolved  - Nasogastric tube if ordered  - Administer ordered antiemetic medications as needed  - Provide nonpharmacologic comfort measures as appropriate  - Advance diet as tolerated, if ordered  - Consider nutrition services referral to assist patient with adequate nutrition and appropriate food choices  Outcome: Progressing  Goal: Maintains or returns to baseline bowel function  Description: INTERVENTIONS:  - Assess bowel function  - Encourage oral fluids to ensure adequate hydration  - Administer IV fluids if ordered to ensure adequate hydration  - Administer ordered medications as needed  - Encourage mobilization and activity  - Consider nutritional services referral to assist patient with adequate nutrition and appropriate food choices  Outcome: Progressing  Goal: Maintains adequate nutritional intake  Description: INTERVENTIONS:  - Monitor percentage of each meal consumed  - Identify factors contributing to decreased intake, treat as appropriate  - Assist with meals as needed  - Monitor I&O, weight, and lab values if indicated  - Obtain nutrition services referral as needed  Outcome: Progressing     Problem: GENITOURINARY - ADULT  Goal: Maintains or returns to baseline urinary function  Description: INTERVENTIONS:  - Assess urinary function  - Encourage oral fluids to ensure adequate hydration if ordered  - Administer IV fluids as ordered to ensure adequate hydration  - Administer ordered medications as needed  - Offer frequent toileting  - Follow urinary retention protocol if ordered  Outcome: Progressing  Goal: Absence of urinary retention  Description: INTERVENTIONS:  - Assess patients ability to void and empty bladder  - Monitor I/O  - Bladder scan as needed  - Discuss with physician/AP medications to alleviate retention as needed  - Discuss catheterization for long term situations as appropriate  Outcome: Progressing  Goal: Urinary catheter remains patent  Description: INTERVENTIONS:  - Assess patency of urinary catheter  - If patient has a chronic montejo, consider changing catheter if non-functioning  - Follow guidelines for intermittent irrigation of non-functioning urinary catheter  Outcome: Progressing     Problem: HEMATOLOGIC - ADULT  Goal: Maintains hematologic stability  Description: INTERVENTIONS  - Assess for signs and symptoms of bleeding or hemorrhage  - Monitor labs  - Administer supportive blood products/factors as ordered and appropriate  Outcome: Progressing     Problem: Nutrition/Hydration-ADULT  Goal: Nutrient/Hydration intake appropriate for improving, restoring or maintaining nutritional needs  Description: Monitor and assess patient's nutrition/hydration status for malnutrition  Collaborate with interdisciplinary team and initiate plan and interventions as ordered  Monitor patient's weight and dietary intake as ordered or per policy  Utilize nutrition screening tool and intervene as necessary  Determine patient's food preferences and provide high-protein, high-caloric foods as appropriate       INTERVENTIONS:  - Monitor oral intake, urinary output, labs, and treatment plans  - Assess nutrition and hydration status and recommend course of action  - Evaluate amount of meals eaten  - Assist patient with eating if necessary   - Allow adequate time for meals  - Recommend/ encourage appropriate diets, oral nutritional supplements, and vitamin/mineral supplements  - Order, calculate, and assess calorie counts as needed  - Recommend, monitor, and adjust tube feedings and TPN/PPN based on assessed needs  - Assess need for intravenous fluids  - Provide specific nutrition/hydration education as appropriate  - Include patient/family/caregiver in decisions related to nutrition  Outcome: Progressing     Problem: COPING  Goal: Pt/Family able to verbalize concerns and demonstrate effective coping strategies  Description: INTERVENTIONS:  - Assist patient/family to identify coping skills, available support systems and cultural and spiritual values  - Provide emotional support, including active listening and acknowledgement of concerns of patient and caregivers  - Reduce environmental stimuli, as able  - Provide patient education  - Assess for spiritual pain/suffering and initiate spiritual care, including notification of Pastoral Care or nohemi based community as needed  - Assess effectiveness of coping strategies  Outcome: Progressing     Problem: CHANGE IN BODY IMAGE  Goal: Pt/Family communicate acceptance of loss or change in body image and expresses psychological comfort and peace  Description: INTERVENTIONS:  - Assess patient/family anxiety and grief process related to change in body image, loss of functional status and loss of sense of self  - Assess patient/family's coping skills and provide emotional, spiritual and psychosocial support  - Provide information about the patient's health status with consideration of family and cultural values  - Communicate willingness to discuss loss and facilitate grief process with patient/family as appropriate  - Emphasize sustaining relationships within family system and community, or nohemi/spiritual traditions  - Refer to community support groups as appropriate  - Initiate Spiritual Care, Pastoral care or other ancillary consults as needed  Outcome: Progressing

## 2022-09-29 NOTE — DISCHARGE SUMMARY
Discharge Summary - Jonathan Colvin 65 y o  male MRN: [id number]    Unit/Bed#: E4 -01 Encounter: [id number]    Admission Date: 9/25/2022   Discharge Date: 09/29/22    Admitting Diagnosis:   Rectal bleeding [K62 5]  Abdominal pain [R10 9]  Perforated bowel ([facility] [address] ) [K63 1]    Discharge Diagnoses: Principal Problem:    Perforated rectum ([facility] [address] )  Active Problems:    Asthma    Hepatitis C    [facility]    Perforated bowel ([facility] [address] )      Consultations: none    Procedures Performed:   SURGERY DATE: 9/26/2022   Surgeon(s) and Role:   * Nolberto Alexander MD - Primary   First assistant was necessary for the surgical safety of the case including suturing, retraction hemostasis  A qualified resident was available   Mihir Bazan Diagnosis: Abdominal pain [R10 9]   Post-Op Diagnosis Codes:   * Abdominal pain [R10 9]   Procedure(s) (LRB):  LAPAROTOMY EXPLORATORY, LYSIS OF ADHESIONS, LOW ANTERIOR RESECTION WITH PRIMARY ANASTOMOSIS (N/A)  RESECTION COLON SIGMOID (N/A)  COLOSTOMY END (N/A)  SIGMOIDOSCOPY RIGID (N/A)  COLOSTOMY LOOP (N/A)     LOW ANTERIOR RESECTION  EXPLORATORY LAPAROTOMY  LYSIS OF ADHESIONS  RIGID SIGMOIDOSCOPY     Correction: no end colostomy, only loop colostomy  Hospital Course: Jonathan Colvin is a 65 y o  male with history of asthma, hepatitis, opiate use in past admitted for perforated of rectosigmoid after the insertion of anal dildo  Patient taken to the operating room emergently as he presented with peritonitis and cat scan showed free intraperitoneal air  He underwent the above procedure which he tolerated well  Post op he had stoma teaching by the wound care nurse  He is comfortable with stoma care  Case management has arranged VNA and he is tolerating a regular diet and has stoma output  At this time he is medically cleared for discharge and will follow up with Dr Sylvain Dotson in 2 weeks    Patient understands he is not to insert any foreign bodies into his rectum  We have also recommended he follow up with his PCP to review the events of this discharge  Condition at Discharge: good     Discharge instructions/Information to patient and family:   See after visit summary for information provided to patient and family  Provisions for Follow-Up Care:  See after visit summary for information related to follow-up care and any pertinent home health orders  Disposition: Home    Planned Readmission: No    Discharge Statement   I spent 20 minutes discharging the patient  This time was spent on the day of discharge  I had direct contact with the patient on the day of discharge  Additional documentation is required if more than 30 minutes were spent on discharge  Discharge Medications:  See after visit summary for reconciled discharge medications provided to patient and family

## 2022-09-29 NOTE — WOUND OSTOMY CARE
Progress Note- Ostomy  Janie Mcbride 65 y o  male  [id number]  [facility] -E4 -01        Assessment:  Patient seen for wound care follow-up and ostomy education  Patient preparing for discharge this afternoon  Reports he's been emptying his pouch and even changed it himself last night due to leakage at medial edge (near incision)  Tolerated food  Voiding  Independently ambulatory  Ostomy pouch lifting off at the edge along the incision  Midline abdominal incision approximated with staples except for areas proximally and distally where wics had been in place  Proximal open area with yellow slough and distal open area pink with significant depth (did not probe depth)  Macey-wound is pink, not indurated  Small serous drainage  Incision contaminated with large amount of stool on exam--patient did not realize his pouch was leaking  Wound cleansed thoroughly and irrigated with normal saline x 3  Abdominal incision isolated with dressing prior to ostomy pouch application to prevent further stool contamination--maxorb Ag and tegaderm applied  Loop colostomy stoma is red, oval, edematous, measuring 2 1/2 x 2 1/4 inches  Peristomal skin and mucocutaneous junction are intact  Crease at 10 o'clock is smaller  Loose, brown stool and flatus in pouch  Stoma support bar removed by surgical team during this visit while ostomy pouch was off  Stoma is very close to the incision  Education:  Patient reports he has reviewed the ostomy education handouts--had questions about best time to change his pouch and showering with his pouch on and off  Questions answered  Reviewed healthy characteristics of the stoma and when to contact the physician  Reviewed daily care of ostomy pouch--emptying when 1/3 full, changing pouch every 3-5 days or if leaking, ok to shower with or without pouch in place, best time to change pouch is in the morning before eating/drinking    Patient able to empty/clean pouch independently  Reviewed ostomy pouching system change process--patient with hands-on participation  Instructed patient on abdominal incision dressing application prior to ostomy pouch application  Patient was able to teach back steps in abdominal incision dressing  Patient was able to teach back all steps in ostomy pouch change independently  Discharge ostomy and wound care supplies provided  Patient to be discharged with [facility] for ongoing ostomy education and support  Patient gave verbal consent to enrollment in  sample/support programs--informed patient that will send samples with belt loops and that he should try wearing an ostomy belt to prevent pouch lifting medially, as it has been doing (no pouches in appropriate size with belt loops are available inpatient at this time)  Surgical resident and PA made aware of stool contamination to abdominal incision due to pouch leakage--ok with isolating the incision from the ostomy pouch with a dressing  Wound 09/26/22 Abdomen N/A (Active)   Wound Image   09/29/22 1209   Wound Description Pink;Yellow 09/29/22 1209   Macey-wound Assessment [place] 09/29/22 1209   Wound Length (cm) 11 5 cm 09/29/22 1209   Wound Width (cm) 0 8 cm 09/29/22 1209   Wound Surface Area (cm^2) 9 2 cm^2 09/29/22 1209   Wound Site Closure Staples; Approximated; Unapproximated 09/29/22 1209   Drainage Amount Scant 09/29/22 1209   Drainage Description Serous 09/29/22 1209   Treatments Cleansed;Irrigation with NSS 09/29/22 1209   Dressing Calcium Alginate with Silver; Other (Comment) 09/29/22 1209   Wound packed? No 09/29/22 1209   Dressing Changed Changed 09/29/22 1209   Patient Tolerance Tolerated well 09/29/22 1209   Dressing Status Clean; Intact;Dry 09/29/22 1209   Number of days: 3         Colostomy Loop LLQ (Active)   Stomal Appliance 1 piece;Leaking; Changed 09/29/22 1209   Stoma Assessment Red;Edema;Budded 09/29/22 1209   Stoma Shape Budded; Oval 09/29/22 1209 Peristomal Assessment Clean; Intact;Mucocutaneous intact 09/29/22 1209   Treatment Bag change;Site care 09/29/22 1209   Number of days: 700 Jeremy GOODEN, RN, [facility]

## 2022-09-29 NOTE — PLAN OF CARE
Problem: Potential for Falls  Goal: Patient will remain free of falls  Description: INTERVENTIONS:  - Educate patient/family on patient safety including physical limitations  - Instruct patient to call for assistance with activity   - Consult OT/PT to assist with strengthening/mobility   - Keep Call bell within reach  - Keep bed low and locked with side rails adjusted as appropriate  - Keep care items and personal belongings within reach  - Initiate and maintain comfort rounds  - Make Fall Risk Sign visible to staff  - Apply yellow socks and bracelet for high fall risk patients  - Consider moving patient to room near nurses station  9/29/2022 1112 by Gogo Muro RN  Outcome: Adequate for Discharge  9/29/2022 0901 by Gogo Muro RN  Outcome: Progressing     Problem: PAIN - ADULT  Goal: Verbalizes/displays adequate comfort level or baseline comfort level  Description: Interventions:  - Encourage patient to monitor pain and request assistance  - Assess pain using appropriate pain scale  - Administer analgesics based on type and severity of pain and evaluate response  - Implement non-pharmacological measures as appropriate and evaluate response  - Consider cultural and social influences on pain and pain management  - Notify physician/advanced practitioner if interventions unsuccessful or patient reports new pain  9/29/2022 1112 by Gogo Muro RN  Outcome: Adequate for Discharge  9/29/2022 0901 by Gogo Muro RN  Outcome: Progressing     Problem: DISCHARGE PLANNING  Goal: Discharge to home or other facility with appropriate resources  Description: INTERVENTIONS:  - Identify barriers to discharge w/patient and caregiver  - Arrange for needed discharge resources and transportation as appropriate  - Identify discharge learning needs (meds, wound care, etc )  - Arrange for interpretive services to assist at discharge as needed  - Refer to Case Management Department for coordinating discharge planning if the patient needs post-hospital services based on physician/advanced practitioner order or complex needs related to functional status, cognitive ability, or social support system  9/29/2022 1112 by Keith Teresa RN  Outcome: Adequate for Discharge  9/29/2022 0901 by Keith Teresa RN  Outcome: Progressing     Problem: GASTROINTESTINAL - ADULT  Goal: Minimal or absence of nausea and/or vomiting  Description: INTERVENTIONS:  - Administer IV fluids if ordered to ensure adequate hydration  - Maintain NPO status until nausea and vomiting are resolved  - Nasogastric tube if ordered  - Administer ordered antiemetic medications as needed  - Provide nonpharmacologic comfort measures as appropriate  - Advance diet as tolerated, if ordered  - Consider nutrition services referral to assist patient with adequate nutrition and appropriate food choices  9/29/2022 1112 by Keith Teresa RN  Outcome: Adequate for Discharge  9/29/2022 0901 by Keith Teresa RN  Outcome: Progressing  Goal: Maintains or returns to baseline bowel function  Description: INTERVENTIONS:  - Assess bowel function  - Encourage oral fluids to ensure adequate hydration  - Administer IV fluids if ordered to ensure adequate hydration  - Administer ordered medications as needed  - Encourage mobilization and activity  - Consider nutritional services referral to assist patient with adequate nutrition and appropriate food choices  9/29/2022 1112 by Keith Teresa RN  Outcome: Adequate for Discharge  9/29/2022 0901 by Keith Teresa RN  Outcome: Progressing  Goal: Maintains adequate nutritional intake  Description: INTERVENTIONS:  - Monitor percentage of each meal consumed  - Identify factors contributing to decreased intake, treat as appropriate  - Assist with meals as needed  - Monitor I&O, weight, and lab values if indicated  - Obtain nutrition services referral as needed  9/29/2022 1112 by Keith Teresa RN  Outcome: Adequate for Discharge  9/29/2022 0901 by Vani Alexandra JOSLYN Howe  Outcome: Progressing     Problem: GENITOURINARY - ADULT  Goal: Maintains or returns to baseline urinary function  Description: INTERVENTIONS:  - Assess urinary function  - Encourage oral fluids to ensure adequate hydration if ordered  - Administer IV fluids as ordered to ensure adequate hydration  - Administer ordered medications as needed  - Offer frequent toileting  - Follow urinary retention protocol if ordered  9/29/2022 1112 by Suly Lunsford RN  Outcome: Adequate for Discharge  9/29/2022 0901 by Suly Lunsford RN  Outcome: Progressing  Goal: Absence of urinary retention  Description: INTERVENTIONS:  - Assess patients ability to void and empty bladder  - Monitor I/O  - Bladder scan as needed  - Discuss with physician/AP medications to alleviate retention as needed  - Discuss catheterization for long term situations as appropriate  9/29/2022 1112 by Suly Lunsford RN  Outcome: Adequate for Discharge  9/29/2022 0901 by Suly Lunsford RN  Outcome: Progressing  Goal: Urinary catheter remains patent  Description: INTERVENTIONS:  - Assess patency of urinary catheter  - If patient has a chronic montejo, consider changing catheter if non-functioning  - Follow guidelines for intermittent irrigation of non-functioning urinary catheter  9/29/2022 1112 by Suly Lunsford RN  Outcome: Adequate for Discharge  9/29/2022 0901 by Suly Lunsford RN  Outcome: Progressing     Problem: HEMATOLOGIC - ADULT  Goal: Maintains hematologic stability  Description: INTERVENTIONS  - Assess for signs and symptoms of bleeding or hemorrhage  - Monitor labs  - Administer supportive blood products/factors as ordered and appropriate  9/29/2022 1112 by Suly Lunsford RN  Outcome: Adequate for Discharge  9/29/2022 0901 by Suly Lunsford RN  Outcome: Progressing     Problem: Nutrition/Hydration-ADULT  Goal: Nutrient/Hydration intake appropriate for improving, restoring or maintaining nutritional needs  Description: Monitor and assess patient's nutrition/hydration status for malnutrition  Collaborate with interdisciplinary team and initiate plan and interventions as ordered  Monitor patient's weight and dietary intake as ordered or per policy  Utilize nutrition screening tool and intervene as necessary  Determine patient's food preferences and provide high-protein, high-caloric foods as appropriate       INTERVENTIONS:  - Monitor oral intake, urinary output, labs, and treatment plans  - Assess nutrition and hydration status and recommend course of action  - Evaluate amount of meals eaten  - Assist patient with eating if necessary   - Allow adequate time for meals  - Recommend/ encourage appropriate diets, oral nutritional supplements, and vitamin/mineral supplements  - Order, calculate, and assess calorie counts as needed  - Recommend, monitor, and adjust tube feedings and TPN/PPN based on assessed needs  - Assess need for intravenous fluids  - Provide specific nutrition/hydration education as appropriate  - Include patient/family/caregiver in decisions related to nutrition  9/29/2022 1112 by Keith Teresa RN  Outcome: Adequate for Discharge  9/29/2022 0901 by Keith Teresa RN  Outcome: Progressing     Problem: COPING  Goal: Pt/Family able to verbalize concerns and demonstrate effective coping strategies  Description: INTERVENTIONS:  - Assist patient/family to identify coping skills, available support systems and cultural and spiritual values  - Provide emotional support, including active listening and acknowledgement of concerns of patient and caregivers  - Reduce environmental stimuli, as able  - Provide patient education  - Assess for spiritual pain/suffering and initiate spiritual care, including notification of Pastoral Care or nohemi based community as needed  - Assess effectiveness of coping strategies  9/29/2022 1112 by Keith Teresa RN  Outcome: Adequate for Discharge  9/29/2022 0901 by Keith Teresa RN  Outcome: Progressing Problem: CHANGE IN BODY IMAGE  Goal: Pt/Family communicate acceptance of loss or change in body image and expresses psychological comfort and peace  Description: INTERVENTIONS:  - Assess patient/family anxiety and grief process related to change in body image, loss of functional status and loss of sense of self  - Assess patient/family's coping skills and provide emotional, spiritual and psychosocial support  - Provide information about the patient's health status with consideration of family and cultural values  - Communicate willingness to discuss loss and facilitate grief process with patient/family as appropriate  - Emphasize sustaining relationships within family system and community, or nohemi/spiritual traditions  - Refer to community support groups as appropriate  - Initiate Spiritual Care, Pastoral care or other ancillary consults as needed  9/29/2022 1112 by Meenu Belcher RN  Outcome: Adequate for Discharge  9/29/2022 0901 by Meenu Belcher, RN  Outcome: Progressing

## 2022-09-29 NOTE — DISCHARGE INSTRUCTIONS
Nothing per rectum or ostomy  1-Ostomy Care:  Empty pouch when 1/3 full of gas or stool  Encourage patient to participate in ostomy care  Change pouch every 3-5 days or if leaking  2-Pouch change (use one piece cut-to-fit pouch only--two piece pouches are too small):  remove pouch using push/pull method  Cleanse stoma and peristomal skin with warm water only, pat dry  Measure stoma and cut pouch barrier to appropriate size  Apply skin prep to peristomal skin  Fill crease at 10 o'clock with strip paste  Apply pouch  Hold gentle pressure with warm hand for 2 minutes to help with pouch adherence  Yina Valdivia Instructions  Dr Juan Welch MD, FACS    1  General: You will feel pulling sensations around the wound or funny aches and pains around the incisions  This is normal  Even minor surgery is a change in your body and this is your bodys way of reacting to it  If you have had abdominal surgery, it may help to support the incision with a small pillow or blanket for comfort when moving or coughing  2  Wound care: Make sure to remove the bandage in about 24 hours, unless instructed otherwise  You usually don't have to redress the wound after 24-48 hours, unless for comfort  Keep the incision clean and dry  Let it air out  If the Steri-Strips fall off, just keep the wound clean  3  Water: You may shower over the wound, unless there are drain tubes left in place  You may shower right over the staples or Steri-Strips, let soap and water run over the wounds but do not scrub, and pat dry when you are done  Do not bathe or use a pool or hot tub until cleared by the physician  4  Activity: You may go up and down stairs, walk as much as you are comfortable, but walk at least 3 times each day  If you have had abdominal surgery, do not lift anything heavier than 15 pounds for at least 2-4 weeks, unless cleared by the doctor      5  Diet: You may resume a regular diet  If you had a same-day surgery or overnight stay surgery, you may wish to eat lightly for a few days: soups, crackers, and sandwiches  You may resume a regular diet when ready  6  Medications: Resume all of your previous medications, unless told otherwise by the doctor  Avoid aspirin or ibuprofen (Advil, Motrin, etc ) products for 2-3 days after the date of surgery  You may, at that time, began to take them again  Tylenol is always fine, unless you are taking any narcotic pain medication containing Tylenol (such as Percocet, Darvocet, Vicodin, or anything containing acetaminophen)  Do not take Tylenol if you're taking these medications  You do not need to take the narcotic pain medications unless you are having significant pain and discomfort  7  Driving: You will need someone to drive you home on the day of surgery  Do not drive or make any important decisions while on narcotic pain medication or 24 hours and after anesthesia or sedation for surgery  Generally, you may drive when you're off all narcotic pain medications  8  Upset Stomach: You may take Maalox, Tums, or similar items for an upset stomach  If your narcotic pain medication causes an upset stomach, do not take it on an empty stomach  Try taking it with at least some crackers or toast      9  Constipation: Patients often experience constipation after surgery  You may take over-the-counter medication for this, such as Metamucil, Senokot, Dulcolax, milk of magnesia, etc  You may take a suppository unless you have had anorectal surgery such as a procedure on your hemorrhoids  If you experience significant nausea or vomiting after abdominal surgery, call the office before trying any of these medications      10  Call the office: If you are experiencing any of the following: fevers above 101 5°, significant nausea or vomiting, if the wound develops drainage and/or excessive or spreading redness around the wound, or if you have significant diarrhea or other worsening symptoms  11  Pain: You may be given a prescription for pain  This will be given to you at the hospital, the day of surgery  12  Sexual Activity: You may resume sexual activity when you feel ready and comfortable and your incision is sealed and healed without apparent infection risk  13  Urination: If you haven't urinated in 6 hours, go directly to the ER for evaluation for urinary retention       [address], [address]  [facility], [address] [employer]  Phone: [phone]

## 2022-09-30 ENCOUNTER — HOME CARE VISIT (OUTPATIENT)
Dept: HOME HEALTH SERVICES | Facility: HOME HEALTHCARE | Age: 57
End: 2022-09-30
Payer: COMMERCIAL

## 2022-09-30 LAB
ABO GROUP BLD BPU: NORMAL
ABO GROUP BLD BPU: NORMAL
BPU ID: NORMAL
BPU ID: NORMAL
CROSSMATCH: NORMAL
CROSSMATCH: NORMAL
UNIT DISPENSE STATUS: NORMAL
UNIT DISPENSE STATUS: NORMAL
UNIT PRODUCT CODE: NORMAL
UNIT PRODUCT CODE: NORMAL
UNIT PRODUCT VOLUME: 350 ML
UNIT PRODUCT VOLUME: 350 ML
UNIT RH: NORMAL
UNIT RH: NORMAL

## 2022-09-30 PROCEDURE — 400013 VN SOC

## 2022-09-30 PROCEDURE — G0299 HHS/HOSPICE OF RN EA 15 MIN: HCPCS

## 2022-09-30 NOTE — TELEPHONE ENCOUNTER
Patient was discharged 9/29/2022  He stated he is doing good, all things considered  Denies having any F/V/N/C  He has visiting nurses coming out to help with care, all information was explained at discharge in terms of how to care for himself  Post op appt scheduled with patient for 10/12  He requested an appt card be mailed to him - this was placed in the mail  While I was speaking to him he voiced his praises for the care he received  He stated everybody he encountered treated him like a human being not just a a body  Everybody and the care was awesome  They were courteous, friendly and compassionate  He questioned how he can more openly voice his praises  I explained he do that on any/all survey's he receives  He acknowledged  Path still pending

## 2022-09-30 NOTE — UTILIZATION REVIEW
Notification of Discharge   This is a Notification of Discharge from our facility [address]  Please be advised that this patient has been discharge from our facility  Below you will find the admission and discharge date and time including the patients disposition  UTILIZATION REVIEW CONTACT:  Reginaldo Aguilera  Utilization   Network Utilization Review Department  Phone: [phone] carefully listen to the prompts  All voicemails are confidential   Email: [email or web address]     PHYSICIAN ADVISORY SERVICES:  FOR PUSR-XC-AXNJ REVIEW - MEDICAL NECESSITY DENIAL  Phone: [phone]  Fax: [phone]  Email: [email or web address]     PRESENTATION DATE: 9/25/2022 10:43 PM  OBERVATION ADMISSION DATE:   INPATIENT ADMISSION DATE: 9/26/22  1:51 AM   DISCHARGE DATE: 9/29/2022  1:11 PM  DISPOSITION: Home with [place] with [address] INFORMATION:  Send all requests for admission clinical reviews, approved or denied determinations and any other requests to dedicated fax number below belonging to the campus where the patient is receiving treatment   List of dedicated fax numbers:  [address] DENIALS (Administrative/Medical Necessity) [phone]   [address]  (Maternity/NICU/Pediatrics) [phone]   [facility] [phone]   [address] [facility] [phone]   [address] [place] [address] [phone]   [address] [phone]   [facility]  [phone]   [address] [place] [address], [address]  2401 [facility] [address] [facility] [phone]

## 2022-10-02 VITALS
RESPIRATION RATE: 18 BRPM | SYSTOLIC BLOOD PRESSURE: 112 MMHG | OXYGEN SATURATION: 95 % | DIASTOLIC BLOOD PRESSURE: 76 MMHG | HEART RATE: 70 BPM | TEMPERATURE: 98 F

## 2022-10-04 ENCOUNTER — HOME CARE VISIT (OUTPATIENT)
Dept: HOME HEALTH SERVICES | Facility: HOME HEALTHCARE | Age: 57
End: 2022-10-04
Payer: COMMERCIAL

## 2022-10-04 VITALS
OXYGEN SATURATION: 95 % | HEART RATE: 91 BPM | TEMPERATURE: 99.2 F | DIASTOLIC BLOOD PRESSURE: 70 MMHG | SYSTOLIC BLOOD PRESSURE: 106 MMHG | RESPIRATION RATE: 18 BRPM

## 2022-10-04 PROCEDURE — G0299 HHS/HOSPICE OF RN EA 15 MIN: HCPCS

## 2022-10-05 ENCOUNTER — HOME CARE VISIT (OUTPATIENT)
Dept: HOME HEALTH SERVICES | Facility: HOME HEALTHCARE | Age: 57
End: 2022-10-05
Payer: COMMERCIAL

## 2022-10-05 ENCOUNTER — OFFICE VISIT (OUTPATIENT)
Dept: SURGERY | Facility: CLINIC | Age: 57
End: 2022-10-05
Payer: COMMERCIAL

## 2022-10-05 VITALS
BODY MASS INDEX: 23.32 KG/M2 | HEART RATE: 83 BPM | SYSTOLIC BLOOD PRESSURE: 130 MMHG | TEMPERATURE: 97.8 F | WEIGHT: 166.6 LBS | HEIGHT: 71 IN | DIASTOLIC BLOOD PRESSURE: 90 MMHG

## 2022-10-05 DIAGNOSIS — K63.1 PERFORATED RECTUM (HCC): ICD-10-CM

## 2022-10-05 DIAGNOSIS — T81.49XA WOUND INFECTION AFTER SURGERY: Primary | ICD-10-CM

## 2022-10-05 DIAGNOSIS — K63.1 PERFORATED BOWEL (HCC): ICD-10-CM

## 2022-10-05 PROCEDURE — G0155 HHCP-SVS OF CSW,EA 15 MIN: HCPCS

## 2022-10-05 PROCEDURE — 99214 OFFICE O/P EST MOD 30 MIN: CPT | Performed by: PHYSICIAN ASSISTANT

## 2022-10-05 RX ORDER — CEPHALEXIN 500 MG/1
500 CAPSULE ORAL 4 TIMES DAILY
Qty: 28 CAPSULE | Refills: 0 | Status: SHIPPED | OUTPATIENT
Start: 2022-10-05 | End: 2022-10-12

## 2022-10-05 NOTE — PROGRESS NOTES
Assessment/Plan:   Mely Chirinos is a 65 y o male who is here for The primary encounter diagnosis was Wound infection after surgery  Diagnoses of Perforated bowel ([facility] ) and Perforated rectum ([facility] ) were also pertinent to this visit  65 y o  male who presents to office for post operative wound evaluation s/p exploratory laparotomy with bowel resection with primary anastomosis with creation of protective loop colostomy on 9/26/22  Patient admits to abdominal pain and drainage from wound  Patient has VNA services but is having trouble with stool leakage into wound due to malpositioned appliance    Patient has a PMH of asthma, hepatitis, opiate dependence but states he has been clean for 2 years  On Physical exam wound with drainage and latanya wound erythema  And tenderness  Stoma functional     Plan: Local wound care, continue teaching of ostomy care by VNA services  Will start on oral antibiotic for possible early wound infection  Continue stables until next week visit  Due to patients co morbidities he is at increased risk of poor wound healing and delayed wound healling  Patient will need a colonoscopy prior to reversal of loop colostomy, Patient is aware that this wont occur prior to 12 weeks from now  Preoperative Clearance: None      ______________________________________________________  CC:Post-op (Possible infection at surgical site - sweats/chills, "angry" redness at site, intermittent lethargy  Patient noted the bags don't want to stay on  )    HPI:  Mely Chirinos is a 65 y o male who was referred for evaluation of Post-op (Possible infection at surgical site - sweats/chills, "angry" redness at site, intermittent lethargy  Patient noted the bags don't want to stay on  )  65 y o  male with asthma, hepatitis, who had a hospital admission for perforated rectosigmoid due to insertion of anal dildo   Patient underwent emergent surgery with exploratory laparotomy, low anterior resection with primary anastomosis and creation of protective proximal loop colostomy  Currently c/o drainage from abdominal wound and contamination of wound from malpositioned ostomy appliance  Wound with pain and redness extending past the wound  Associated sweats and chills  Colostomy function with formed stool, appliance intact      ROS:  General ROS: negative  negative for - chills, fatigue, fever or night sweats, weight loss  Respiratory ROS: no cough, shortness of breath, or wheezing  Cardiovascular ROS: no chest pain or dyspnea on exertion  Genito-Urinary ROS: no dysuria, trouble voiding, or hematuria  Musculoskeletal ROS: negative for - gait disturbance, joint pain or muscle pain  Neurological ROS: no TIA or stroke symptoms  Gastrointestinal: see HPI  No abdominal pain, melena, BRB unless specified in HPI  Skin ROS: no new rashes or lesions   Lymphatic ROS: no new adenopathy noted by pt     GYN ROS: see HPI, no new GYN history or bleeding noted  Psy ROS: no new mental or behavioral disturbances       Patient Active Problem List   Diagnosis    Asthma    Hepatitis C    [facility]ch[facility] disease) ([facility] )    [facility]    Perforated rectum (HCC)         Allergies:  Bactrim [sulfamethoxazole-trimethoprim], Ciprofloxacin, Dust mite extract, Other, and Sulfa antibiotics      Current Outpatient Medications:     cephalexin (KEFLEX) 500 mg capsule, Take 1 capsule (500 mg total) by mouth 4 (four) times a day for 7 days, Disp: 28 capsule, Rfl: 0    acetaminophen (TYLENOL) 325 mg tablet, Take 2 tablets (650 mg total) by mouth every 6 (six) hours as needed for mild pain (Patient not taking: Reported on 10/5/2022), Disp: , Rfl: 0    gabapentin (NEURONTIN) 100 mg capsule, Take 1 capsule (100 mg total) by mouth 3 (three) times a day (Patient not taking: Reported on 10/5/2022), Disp: 21 capsule, Rfl: 0    methocarbamol (ROBAXIN) 500 mg tablet, Take 1 tablet (500 mg total) by mouth every 6 (six) hours as needed for muscle spasms (Patient not taking: Reported on 10/5/2022), Disp: 20 tablet, Rfl: 0    Past Medical History:   Diagnosis Date    Asthma     Bronchitis     COPD (chronic obstructive pulmonary disease) (HCC)     Hepatitis C     Pneumonia        Past Surgical History:   Procedure Laterality Date    COLOSTOMY N/A 9/26/2022    Procedure: COLOSTOMY END;  Surgeon: Megan Sherman MD;  [facility];  Service: General    COLOSTOMY N/A 9/26/2022    Procedure: COLOSTOMY LOOP;  Surgeon: Megan Sherman MD;  [facility];  Service: General    LAPAROTOMY N/A 9/26/2022    Procedure: LAPAROTOMY EXPLORATORY, LYSIS OF ADHESIONS, LOW ANTERIOR RESECTION WITH PRIMARY ANASTOMOSIS;  Surgeon: Megan Sherman MD;  [facility];  Service: General    MOUTH SURGERY      MA PART REMOVAL COLON W ANASTOMOSIS N/A 9/26/2022    Procedure: RESECTION COLON SIGMOID;  Surgeon: Megan Sherman MD;  [facility];  Service: General    MA PROCTOSIGMOIDOSCOPY,RIGID,DIAGNOS N/A 9/26/2022    Procedure: Otelia Geni RIGID;  Surgeon: Megan Sherman MD;  [facility];  Service: General       History reviewed  No pertinent family history  reports that he has been smoking cigarettes  He has been smoking about 0 25 packs per day  He has never used smokeless tobacco  He reports previous alcohol use  He reports previous drug use  Drugs: Marijuana, Heroin, Methamphetamines, and Fentanyl      Labs:   Lab Results   Component Value Date    WBC 8 28 09/28/2022    HGB 11 5 (L) 09/28/2022    HCT 33 5 (L) 09/28/2022    MCV 99 (H) 09/28/2022     (L) 09/28/2022     Lab Results   Component Value Date     08/04/2015    K 4 1 09/28/2022     (H) 09/28/2022    CO2 28 09/28/2022    ANIONGAP 8 08/04/2015    BUN 10 09/28/2022    CREATININE 0 72 09/28/2022    GLUCOSE 160 (H) 08/04/2015    CALCIUM 8 5 09/28/2022    CORRECTEDCA 9 3 09/27/2022    AST 27 09/27/2022    ALT 43 09/27/2022 ALKPHOS 29 (L) 09/27/2022    EGFR 105 09/28/2022         Imaging: No new pertinent imaging studies  PHYSICAL EXAM    Vitals:    10/05/22 1059   BP: 130/90   Pulse: 83   Temp: 97 8 °F (36 6 °C)          PHYSICAL EXAM  General Appearance:    Alert, cooperative, no distress,   Head:    Normocephalic without obvious abnormality   Eyes:    PERRL, conjunctiva/corneas clear, EOM's intact        Neck:   Supple, no adenopathy, no JVD   Back:     Symmetric, no spinal or CVA tenderness   Lungs:     Clear to auscultation bilaterally, no wheezing or rhonchi   Heart:    Regular rate and rhythm, S1 and S2 normal, no murmur   Abdomen:     Soft, latanya incisional tenderness some mild erythema , wound partially opened distal aspect with cloudy serous drainage  Loop colostomy pink and viable with stool in bag   Extremities:   Extremities normal  No clubbing, cyanosis or edema   Psych:   Normal Affect   Neurologic:   CNII-XII intact  Strength symmetric, speech intact                                           Some portions of this record may have been generated with voice recognition software  There may be translation, syntax,  or grammatical errors  Occasional wrong word or "sound-a-like" substitutions may have occurred due to the inherent limitations of the voice recognition software  Read the chart carefully and recognize, using context, where substitutions may have occurred  If you have any questions, please contact the dictating provider for clarification or correction, as needed  This encounter has been coded by a non-certified coder         Alicia Bowen PA-C    Date: 10/5/2022 Time: 11:52 AM

## 2022-10-06 PROCEDURE — 88307 TISSUE EXAM BY PATHOLOGIST: CPT | Performed by: PATHOLOGY

## 2022-10-06 PROCEDURE — 88304 TISSUE EXAM BY PATHOLOGIST: CPT | Performed by: PATHOLOGY

## 2022-10-07 ENCOUNTER — HOME CARE VISIT (OUTPATIENT)
Dept: HOME HEALTH SERVICES | Facility: HOME HEALTHCARE | Age: 57
End: 2022-10-07
Payer: COMMERCIAL

## 2022-10-07 NOTE — ANESTHESIA POSTPROCEDURE EVALUATION
Post-Op Assessment Note    CV Status:  Stable  Pain Score: 3    Pain management: adequate     Mental Status:  Alert and awake   Hydration Status:  Euvolemic   PONV Controlled:  Controlled   Airway Patency:  Patent  Airway: intubated   Two or more mitigation strategies used for obstructive sleep apnea   Post Op Vitals Reviewed: Yes      Staff: Anesthesiologist, CRNA         No complications documented      BP      Temp      Pulse     Resp      SpO2      /79 (BP Location: Right arm)   Pulse 73   Temp 98 2 °F (36 8 °C) (Temporal)   Resp 18   Ht 5' 11" (1 803 m)   Wt 81 7 kg (180 lb 1 9 oz)   SpO2 92%   BMI 25 12 kg/m²

## 2022-10-10 ENCOUNTER — HOME CARE VISIT (OUTPATIENT)
Dept: HOME HEALTH SERVICES | Facility: HOME HEALTHCARE | Age: 57
End: 2022-10-10
Payer: COMMERCIAL

## 2022-10-10 VITALS
DIASTOLIC BLOOD PRESSURE: 68 MMHG | OXYGEN SATURATION: 95 % | HEART RATE: 76 BPM | TEMPERATURE: 97.8 F | SYSTOLIC BLOOD PRESSURE: 110 MMHG | RESPIRATION RATE: 18 BRPM

## 2022-10-10 PROCEDURE — G0299 HHS/HOSPICE OF RN EA 15 MIN: HCPCS

## 2022-10-12 ENCOUNTER — OFFICE VISIT (OUTPATIENT)
Dept: SURGERY | Facility: CLINIC | Age: 57
End: 2022-10-12

## 2022-10-12 VITALS
OXYGEN SATURATION: 96 % | BODY MASS INDEX: 23.66 KG/M2 | DIASTOLIC BLOOD PRESSURE: 60 MMHG | HEIGHT: 71 IN | SYSTOLIC BLOOD PRESSURE: 130 MMHG | WEIGHT: 169 LBS | HEART RATE: 82 BPM

## 2022-10-12 DIAGNOSIS — K63.1 PERFORATED BOWEL (HCC): ICD-10-CM

## 2022-10-12 DIAGNOSIS — K63.1 PERFORATED RECTUM (HCC): ICD-10-CM

## 2022-10-12 DIAGNOSIS — T81.49XA WOUND INFECTION AFTER SURGERY: Primary | ICD-10-CM

## 2022-10-12 PROCEDURE — 99024 POSTOP FOLLOW-UP VISIT: CPT | Performed by: PHYSICIAN ASSISTANT

## 2022-10-12 NOTE — PROGRESS NOTES
Assessment/Plan:   Jeny Toscano is a 65 y o male who comes in today for postoperative check after check after exploratory laparotomy with bowel resection for perforated bowel and rectum  Continuing to follow his wound due to partial dehiscence at skin level and drainage  Drainage has improved    Patient is pleased with the outcome of surgery and is doing well  Postoperative restrictions reviewed  All questions answered  Staples removed  Small dressing placed along incision  Open area at proximal incision with hypergranulation tissue treated with silver nitrate  ____________________________________________________________    HPI:  Jeny Toscano is a 65 y o male who comes in today for postoperative check after recent surgery  Currently doing well with some problems : open wound with drainage, no fever or chills,no nausea and no vomiting  Reports colostomy functional   Some minimal drainage from wound  ROS:  General ROS: negative for - chills, fatigue, fever or night sweats, weight loss  Respiratory ROS: no cough, shortness of breath, or wheezing  Cardiovascular ROS: no chest pain or dyspnea on exertion  Genito-Urinary ROS: no dysuria, trouble voiding, or hematuria  Musculoskeletal ROS: negative for - gait disturbance, joint pain or muscle pain  Neurological ROS: no TIA or stroke symptoms  GI ROS: see HPI  Skin ROS: no new rashes or lesions   Lymphatic ROS: no new adenopathy noted by pt     GYN ROS: see HPI, no new GYN history or bleeding noted  Psy ROS: no new mental or behavioral disturbances       Patient Active Problem List   Diagnosis   • Asthma   • Hepatitis C   • COPD (chronic obstructive pulmonary disease) (HCC)   • Perforated bowel ([facility] )   • Perforated rectum (HCC)         Allergies:  Bactrim [sulfamethoxazole-trimethoprim], Ciprofloxacin, Dust mite extract, Other, and Sulfa antibiotics      Current Outpatient Medications:   •  acetaminophen (TYLENOL) 325 mg tablet, Take 2 tablets (650 mg total) by mouth every 6 (six) hours as needed for mild pain (Patient not taking: Reported on 10/5/2022), Disp: , Rfl: 0  •  cephalexin (KEFLEX) 500 mg capsule, Take 1 capsule (500 mg total) by mouth 4 (four) times a day for 7 days, Disp: 28 capsule, Rfl: 0  •  gabapentin (NEURONTIN) 100 mg capsule, Take 1 capsule (100 mg total) by mouth 3 (three) times a day (Patient not taking: Reported on 10/5/2022), Disp: 21 capsule, Rfl: 0  •  methocarbamol (ROBAXIN) 500 mg tablet, Take 1 tablet (500 mg total) by mouth every 6 (six) hours as needed for muscle spasms (Patient not taking: Reported on 10/5/2022), Disp: 20 tablet, Rfl: 0    Past Medical History:   Diagnosis Date   • Asthma    • Bronchitis    • COPD (chronic obstructive pulmonary disease) ([facility] [address] )    • Hepatitis C    • Pneumonia        Past Surgical History:   Procedure Laterality Date   • COLOSTOMY N/A 9/26/2022    Procedure: COLOSTOMY END;  Surgeon: Juan Welch MD;  [facility];  Service: General   • COLOSTOMY N/A 9/26/2022    Procedure: COLOSTOMY LOOP;  Surgeon: Juan Welch MD;  [facility];  Service: General   • LAPAROTOMY N/A 9/26/2022    Procedure: LAPAROTOMY EXPLORATORY, LYSIS OF ADHESIONS, LOW ANTERIOR RESECTION WITH PRIMARY ANASTOMOSIS;  Surgeon: Juan Welch MD;  [facility];  Service: General   • MOUTH SURGERY     • IA PART REMOVAL COLON W ANASTOMOSIS N/A 9/26/2022    Procedure: RESECTION COLON SIGMOID;  Surgeon: Juan Welch MD;  [facility];  Service: General   • IA PROCTOSIGMOIDOSCOPY,RIGID,DIAGNOS N/A 9/26/2022    Procedure: Saritha Danielson RIGID;  Surgeon: Juan Welch MD;  [facility];  Service: General       History reviewed  No pertinent family history  reports that he has been smoking cigarettes  He has been smoking about 0 25 packs per day  He has never used smokeless tobacco  He reports previous alcohol use  He reports previous drug use   Drugs: Marijuana, Heroin, Methamphetamines, and Fentanyl  Invalid input(s):  EOSPCT          Invalid input(s): LABALBU    Imaging: no new    Vitals:    10/12/22 1001   BP: 130/60   Pulse: 82   SpO2: 96%        PHYSICAL EXAM  General: normal, cooperative, no distress  Incision: with serous drainage and open at distal and proximal aspect  Physical Exam  Skin:                 Some portions of this record may have been generated with voice recognition software  There may be translation, syntax,  or grammatical errors  Occasional wrong word or "sound-a-like" substitutions may have occurred due to the inherent limitations of the voice recognition software  Read the chart carefully and recognize, using context, where substitutions may have occurred  If you have any questions, please contact the dictating provider for clarification or correction, as needed  This encounter has been coded by a non-certified coder         Susie Topete PA-C    Date: 10/12/2022 Time: 11:55 AM

## 2022-10-14 ENCOUNTER — HOME CARE VISIT (OUTPATIENT)
Dept: HOME HEALTH SERVICES | Facility: HOME HEALTHCARE | Age: 57
End: 2022-10-14
Payer: COMMERCIAL

## 2022-10-14 PROCEDURE — G0299 HHS/HOSPICE OF RN EA 15 MIN: HCPCS

## 2022-10-16 ENCOUNTER — HOME CARE VISIT (OUTPATIENT)
Dept: HOME HEALTH SERVICES | Facility: HOME HEALTHCARE | Age: 57
End: 2022-10-16
Payer: COMMERCIAL

## 2022-10-16 VITALS
TEMPERATURE: 97.2 F | OXYGEN SATURATION: 95 % | SYSTOLIC BLOOD PRESSURE: 112 MMHG | DIASTOLIC BLOOD PRESSURE: 68 MMHG | RESPIRATION RATE: 18 BRPM | HEART RATE: 78 BPM

## 2022-10-17 ENCOUNTER — TELEPHONE (OUTPATIENT)
Dept: SURGERY | Facility: CLINIC | Age: 57
End: 2022-10-17

## 2022-10-17 NOTE — TELEPHONE ENCOUNTER
I contacted patient regarding pathology results  He stated they were not discussed at his follow up appt on 10/12  I did provide them to him  He was asking about scheduling a colonoscopy  Based on the 10/5 note - this is to not occur any sooner than 12 weeks from now  So I advised from that date he should contact is in about 12 weeks and we can bring him in to get that process started  He acknowledged

## 2022-10-18 ENCOUNTER — HOME CARE VISIT (OUTPATIENT)
Dept: HOME HEALTH SERVICES | Facility: HOME HEALTHCARE | Age: 57
End: 2022-10-18
Payer: COMMERCIAL

## 2022-10-18 VITALS
RESPIRATION RATE: 18 BRPM | DIASTOLIC BLOOD PRESSURE: 60 MMHG | TEMPERATURE: 98.1 F | OXYGEN SATURATION: 97 % | SYSTOLIC BLOOD PRESSURE: 104 MMHG | HEART RATE: 75 BPM

## 2022-10-18 PROCEDURE — G0299 HHS/HOSPICE OF RN EA 15 MIN: HCPCS

## 2022-10-21 ENCOUNTER — HOME CARE VISIT (OUTPATIENT)
Dept: HOME HEALTH SERVICES | Facility: HOME HEALTHCARE | Age: 57
End: 2022-10-21
Payer: COMMERCIAL

## 2022-10-21 PROCEDURE — G0299 HHS/HOSPICE OF RN EA 15 MIN: HCPCS

## 2022-10-27 NOTE — H&P (VIEW-ONLY)
Assessment/Plan:   Michelle Biggs is a 65 y o male who comes in today for postoperative check after check after exploratory laparotomy with bowel resection for perforated bowel and rectum due to foreign body with ileostomy   On September 26, 2022  He has a loop sigmoid colostomy     The wound now has completely healed  He is anxious to return to work due to financial situation  Patient is pleased with the outcome of surgery and is doing well  Plan  1  Colonoscopy  2  Reversal of loop sigmoid colostomy the day after with a bowel prep     ____________________________________________________________    HPI:  Michelle Biggs is a 65 y o male who comes in today for postoperative check after recent surgery  Currently doing well with some problems : open wound with drainage, no fever or chills,no nausea and no vomiting  Reports colostomy functional   Some minimal drainage from wound  ROS:  General ROS: negative for - chills, fatigue, fever or night sweats, weight loss  Respiratory ROS: no cough, shortness of breath, or wheezing  Cardiovascular ROS: no chest pain or dyspnea on exertion  Genito-Urinary ROS: no dysuria, trouble voiding, or hematuria  Musculoskeletal ROS: negative for - gait disturbance, joint pain or muscle pain  Neurological ROS: no TIA or stroke symptoms  GI ROS: see HPI  Skin ROS: no new rashes or lesions   Lymphatic ROS: no new adenopathy noted by pt     GYN ROS: see HPI, no new GYN history or bleeding noted  Psy ROS: no new mental or behavioral disturbances       Patient Active Problem List   Diagnosis   • Asthma   • Hepatitis C   • COPD (chronic obstructive pulmonary disease) ([facility])   • [facility]   • Perforated rectum ([facility] )   • Colostomy present ([facility])         Allergies:  Bactrim [sulfamethoxazole-trimethoprim], Ciprofloxacin, Dust mite extract, Other, and Sulfa antibiotics      Current Outpatient Medications:   •  acetaminophen (TYLENOL) 325 mg tablet, Take 2 tablets (650 mg total) by mouth every 6 (six) hours as needed for mild pain (Patient not taking: No sig reported), Disp: , Rfl: 0  •  gabapentin (NEURONTIN) 100 mg capsule, Take 1 capsule (100 mg total) by mouth 3 (three) times a day (Patient not taking: No sig reported), Disp: 21 capsule, Rfl: 0  •  methocarbamol (ROBAXIN) 500 mg tablet, Take 1 tablet (500 mg total) by mouth every 6 (six) hours as needed for muscle spasms (Patient not taking: No sig reported), Disp: 20 tablet, Rfl: 0    Past Medical History:   Diagnosis Date   • Asthma    • Bronchitis    • COPD (chronic obstructive pulmonary disease) ([facility] )    • Hepatitis C    • Pneumonia        Past Surgical History:   Procedure Laterality Date   • COLOSTOMY N/A 9/26/2022    Procedure: COLOSTOMY END;  Surgeon: Elie Sheets MD;  [facility];  Service: General   • COLOSTOMY N/A 9/26/2022    Procedure: COLOSTOMY LOOP;  Surgeon: Elie Sheets MD;  [facility];  Service: General   • LAPAROTOMY N/A 9/26/2022    Procedure: LAPAROTOMY EXPLORATORY, LYSIS OF ADHESIONS, LOW ANTERIOR RESECTION WITH PRIMARY ANASTOMOSIS;  Surgeon: Elie Sheets MD;  [facility];  Service: General   • MOUTH SURGERY     • [place] PART REMOVAL COLON W ANASTOMOSIS N/A 9/26/2022    Procedure: RESECTION COLON SIGMOID;  Surgeon: Elie Sheets MD;  [facility];  Service: General   • WY PROCTOSIGMOIDOSCOPY,RIGID,DIAGNOS N/A 9/26/2022    Procedure: Mary Gouge RIGID;  Surgeon: Elie Sheets MD;  [facility];  Service: General       History reviewed  No pertinent family history  reports that he has been smoking cigarettes  He has been smoking about 0 25 packs per day  He has never used smokeless tobacco  He reports previous alcohol use  He reports previous drug use  Drugs: Marijuana, Heroin, Methamphetamines, and Fentanyl            Invalid input(s):  EOSPCT          Invalid input(s): LABALBU    Imaging: no new    Vitals:    10/31/22 0950   BP: 122/80 Pulse: 78   Temp: 97 6 °F (36 4 °C)        PHYSICAL EXAM  General: normal, cooperative, no distress  Incision: with serous drainage and open at distal and proximal aspect  Physical Exam  Skin:                 Some portions of this record may have been generated with voice recognition software  There may be translation, syntax,  or grammatical errors  Occasional wrong word or "sound-a-like" substitutions may have occurred due to the inherent limitations of the voice recognition software  Read the chart carefully and recognize, using context, where substitutions may have occurred  If you have any questions, please contact the dictating provider for clarification or correction, as needed  This encounter has been coded by a non-certified coder         Theresa Shelton MD    Date: 10/31/2022 Time: 10:18 AM

## 2022-10-27 NOTE — PROGRESS NOTES
Assessment/Plan:   Raymundo Montague is a 65 y o male who comes in today for postoperative check after check after exploratory laparotomy with bowel resection for perforated bowel and rectum due to foreign body with ileostomy   On September 26, 2022  He has a loop sigmoid colostomy     The wound now has completely healed  He is anxious to return to work due to financial situation  Patient is pleased with the outcome of surgery and is doing well  Plan  1  Colonoscopy  2  Reversal of loop sigmoid colostomy the day after with a bowel prep     ____________________________________________________________    HPI:  Raymundo Montague is a 65 y o male who comes in today for postoperative check after recent surgery  Currently doing well with some problems : open wound with drainage, no fever or chills,no nausea and no vomiting  Reports colostomy functional   Some minimal drainage from wound  ROS:  General ROS: negative for - chills, fatigue, fever or night sweats, weight loss  Respiratory ROS: no cough, shortness of breath, or wheezing  Cardiovascular ROS: no chest pain or dyspnea on exertion  Genito-Urinary ROS: no dysuria, trouble voiding, or hematuria  Musculoskeletal ROS: negative for - gait disturbance, joint pain or muscle pain  Neurological ROS: no TIA or stroke symptoms  GI ROS: see HPI  Skin ROS: no new rashes or lesions   Lymphatic ROS: no new adenopathy noted by pt     GYN ROS: see HPI, no new GYN history or bleeding noted  Psy ROS: no new mental or behavioral disturbances       Patient Active Problem List   Diagnosis   • Asthma   • Hepatitis C   • COPD (chronic obstructive pulmonary disease) (HCC)   • Perforated bowel ([facility] )   • [facility]   • Colostomy present (HCC)         Allergies:  Bactrim [sulfamethoxazole-trimethoprim], Ciprofloxacin, Dust mite extract, Other, and Sulfa antibiotics      Current Outpatient Medications:   •  acetaminophen (TYLENOL) 325 mg tablet, Take 2 tablets (650 mg total) by mouth every 6 (six) hours as needed for mild pain (Patient not taking: No sig reported), Disp: , Rfl: 0  •  gabapentin (NEURONTIN) 100 mg capsule, Take 1 capsule (100 mg total) by mouth 3 (three) times a day (Patient not taking: No sig reported), Disp: 21 capsule, Rfl: 0  •  methocarbamol (ROBAXIN) 500 mg tablet, Take 1 tablet (500 mg total) by mouth every 6 (six) hours as needed for muscle spasms (Patient not taking: No sig reported), Disp: 20 tablet, Rfl: 0    Past Medical History:   Diagnosis Date   • Asthma    • Bronchitis    • COPD (chronic obstructive pulmonary disease) ([facility] )    • Hepatitis C    • Pneumonia        Past Surgical History:   Procedure Laterality Date   • COLOSTOMY N/A 9/26/2022    Procedure: COLOSTOMY END;  Surgeon: Thomas Melendrez MD;  [facility];  Service: General   • COLOSTOMY N/A 9/26/2022    Procedure: COLOSTOMY LOOP;  Surgeon: Thomas Melendrez MD;  [facility];  Service: General   • LAPAROTOMY N/A 9/26/2022    Procedure: LAPAROTOMY EXPLORATORY, LYSIS OF ADHESIONS, LOW ANTERIOR RESECTION WITH PRIMARY ANASTOMOSIS;  Surgeon: Thomas Melendrez MD;  [facility];  Service: General   • MOUTH SURGERY     • UT PART REMOVAL COLON W ANASTOMOSIS N/A 9/26/2022    Procedure: RESECTION COLON SIGMOID;  Surgeon: Thomas Melendrez MD;  [facility];  Service: General   • UT PROCTOSIGMOIDOSCOPY,RIGID,DIAGNOS N/A 9/26/2022    Procedure: [facility] RIGID;  Surgeon: Thomas Melendrez MD;  [facility];  Service: General       History reviewed  No pertinent family history  reports that he has been smoking cigarettes  He has been smoking about 0 25 packs per day  He has never used smokeless tobacco  He reports previous alcohol use  He reports previous drug use  Drugs: Marijuana, Heroin, Methamphetamines, and Fentanyl            Invalid input(s):  EOSPCT          Invalid input(s): LABALBU    Imaging: no new    Vitals:    10/31/22 0950   BP: 122/80 Pulse: 78   Temp: 97 6 °F (36 4 °C)        PHYSICAL EXAM  General: normal, cooperative, no distress  Incision: with serous drainage and open at distal and proximal aspect  Physical Exam  Skin:                 Some portions of this record may have been generated with voice recognition software  There may be translation, syntax,  or grammatical errors  Occasional wrong word or "sound-a-like" substitutions may have occurred due to the inherent limitations of the voice recognition software  Read the chart carefully and recognize, using context, where substitutions may have occurred  If you have any questions, please contact the dictating provider for clarification or correction, as needed  This encounter has been coded by a non-certified coder         Melodie Womack MD    Date: 10/31/2022 Time: 10:18 AM

## 2022-10-31 ENCOUNTER — OFFICE VISIT (OUTPATIENT)
Dept: SURGERY | Facility: CLINIC | Age: 57
End: 2022-10-31

## 2022-10-31 VITALS
SYSTOLIC BLOOD PRESSURE: 122 MMHG | BODY MASS INDEX: 24.16 KG/M2 | HEIGHT: 71 IN | WEIGHT: 172.6 LBS | DIASTOLIC BLOOD PRESSURE: 80 MMHG | TEMPERATURE: 97.6 F | HEART RATE: 78 BPM

## 2022-10-31 DIAGNOSIS — K63.1 PERFORATED RECTUM (HCC): Primary | ICD-10-CM

## 2022-10-31 DIAGNOSIS — Z93.3 COLOSTOMY PRESENT (HCC): ICD-10-CM

## 2022-11-10 NOTE — PRE-PROCEDURE INSTRUCTIONS
No outpatient medications have been marked as taking for the 11/23/22 encounter [facility] Encounter)  Pre op and bathing instructions reviewed  Pt has hibiclens  Pt  Verbalized understanding of current visitor restrictions  Pt  Verbalized an understanding of all instructions reviewed and offers no concerns at this time  Instructed to avoid all ASA/NSAIDs and OTC Vit/Supp from now until after surgery per anesthesia guidelines   Tylenol ok prn

## 2022-11-18 RX ORDER — SODIUM CHLORIDE 9 MG/ML
125 INJECTION, SOLUTION INTRAVENOUS CONTINUOUS
Status: CANCELLED | OUTPATIENT
Start: 2022-11-18

## 2022-11-21 ENCOUNTER — ANESTHESIA EVENT (OUTPATIENT)
Dept: PERIOP | Facility: HOSPITAL | Age: 57
End: 2022-11-21

## 2022-11-22 ENCOUNTER — ANESTHESIA EVENT (OUTPATIENT)
Dept: GASTROENTEROLOGY | Facility: HOSPITAL | Age: 57
End: 2022-11-22

## 2022-11-22 ENCOUNTER — ANESTHESIA (OUTPATIENT)
Dept: GASTROENTEROLOGY | Facility: HOSPITAL | Age: 57
End: 2022-11-22

## 2022-11-22 ENCOUNTER — HOSPITAL ENCOUNTER (OUTPATIENT)
Dept: GASTROENTEROLOGY | Facility: HOSPITAL | Age: 57
Setting detail: OUTPATIENT SURGERY
Discharge: HOME | End: 2022-11-22
Attending: SURGERY

## 2022-11-22 VITALS
HEIGHT: 71 IN | RESPIRATION RATE: 17 BRPM | OXYGEN SATURATION: 94 % | BODY MASS INDEX: 23.1 KG/M2 | SYSTOLIC BLOOD PRESSURE: 98 MMHG | DIASTOLIC BLOOD PRESSURE: 64 MMHG | WEIGHT: 165 LBS | TEMPERATURE: 97.1 F | HEART RATE: 80 BPM

## 2022-11-22 DIAGNOSIS — Z93.3 COLOSTOMY PRESENT (HCC): ICD-10-CM

## 2022-11-22 PROBLEM — K63.1 PERFORATED BOWEL (HCC): Status: RESOLVED | Noted: 2022-09-26 | Resolved: 2022-11-22

## 2022-11-22 PROBLEM — K63.1 PERFORATED RECTUM (HCC): Status: RESOLVED | Noted: 2022-09-26 | Resolved: 2022-11-22

## 2022-11-22 RX ORDER — LIDOCAINE HYDROCHLORIDE 20 MG/ML
INJECTION, SOLUTION EPIDURAL; INFILTRATION; INTRACAUDAL; PERINEURAL AS NEEDED
Status: DISCONTINUED | OUTPATIENT
Start: 2022-11-22 | End: 2022-11-22

## 2022-11-22 RX ORDER — SODIUM CHLORIDE 9 MG/ML
125 INJECTION, SOLUTION INTRAVENOUS CONTINUOUS
Status: DISCONTINUED | OUTPATIENT
Start: 2022-11-22 | End: 2022-11-26 | Stop reason: HOSPADM

## 2022-11-22 RX ORDER — PROPOFOL 10 MG/ML
INJECTION, EMULSION INTRAVENOUS AS NEEDED
Status: DISCONTINUED | OUTPATIENT
Start: 2022-11-22 | End: 2022-11-22

## 2022-11-22 RX ADMIN — SODIUM CHLORIDE: 0.9 INJECTION, SOLUTION INTRAVENOUS at 09:54

## 2022-11-22 RX ADMIN — PROPOFOL 40 MG: 10 INJECTION, EMULSION INTRAVENOUS at 10:25

## 2022-11-22 RX ADMIN — PROPOFOL 30 MG: 10 INJECTION, EMULSION INTRAVENOUS at 10:02

## 2022-11-22 RX ADMIN — PROPOFOL 100 MG: 10 INJECTION, EMULSION INTRAVENOUS at 10:14

## 2022-11-22 RX ADMIN — PROPOFOL 40 MG: 10 INJECTION, EMULSION INTRAVENOUS at 10:06

## 2022-11-22 RX ADMIN — PROPOFOL 40 MG: 10 INJECTION, EMULSION INTRAVENOUS at 10:19

## 2022-11-22 RX ADMIN — PROPOFOL 30 MG: 10 INJECTION, EMULSION INTRAVENOUS at 09:59

## 2022-11-22 RX ADMIN — PROPOFOL 40 MG: 10 INJECTION, EMULSION INTRAVENOUS at 10:10

## 2022-11-22 RX ADMIN — PROPOFOL 100 MG: 10 INJECTION, EMULSION INTRAVENOUS at 09:56

## 2022-11-22 RX ADMIN — LIDOCAINE HYDROCHLORIDE 50 MG: 20 INJECTION, SOLUTION EPIDURAL; INFILTRATION; INTRACAUDAL; PERINEURAL at 09:56

## 2022-11-22 NOTE — DISCHARGE INSTRUCTIONS
Mariann Brennan  Endoscopy Post-Operative Instructions  Dr Pete Montgomery MD, FACS    Procedure: Colonoscopy    Findings:  Colon Polyp(s)  THIS POLYP IS INCOMPLETELY REMOVED  ABOUT 2/3 OF THE POLYP IS REMOVED  YOU MUST HAVE ANOTHER COLONOSCOPY IN 3 TO 6 MONTHS TO ENSURE COMPLETE REMOVAL  Follow-Up: You will need a repeat Endoscopy in (generally)6 months  Will await final pathology report for final determination of number of years until your follow up endoscopy, if you had polyps on this exam   Different types of polyps require different lengths of follow up surveillance  Please call our office or your primary doctor's office if you have any questions, once the report is returned  You should have an endoscopy sooner than recommended if you have any symptoms of bleeding or change in stools or other concerns  You will receive a call from our office with your results, in addition to the the preliminary results you received today  You will usually receive a follow-up letter from our office in 1-2 weeks  Call the office if you do not hear from us  You are welcome to also schedule an office visit if desired to discuss the results further  It is your responsibility to contact our office for results in 1- 2 weeks if you do not hear from us  If a follow up endoscopy is needed, you are responsible for arranging that follow up appointment at the appropriate time  The office may or may not issue a reminder at that future time  Please take responsibility for your own follow up healthcare  Diet: Eat a light snack first, and then resume your previous diet  Call the office if you have unusual fevers, chills, nausea or vomiting or abdominal pain  Report to the emergency room with these are severe in nature  Activity: Do not drive a car, operate machinery, or sign legal documents for 24 hours after your procedure   Normal activity may be resumed on the day following the procedure  Call the office at [phone] for any of the following: Severe abdominal pain, significant rectal bleeding, chills, or fever above 100°, new onset of persistent cough or persistent vomiting       [address], [address]  [place], [address]   Phone: [phone]

## 2022-11-22 NOTE — ANESTHESIA POSTPROCEDURE EVALUATION
Post-Op Assessment Note    CV Status:  Stable    Pain management: adequate     Mental Status:  Alert and awake   Hydration Status:  Euvolemic   PONV Controlled:  Controlled   Airway Patency:  Patent   There is a medical reason for not screening for obstructive sleep apnea and/or for not using two or more mitigation strategies   Post Op Vitals Reviewed: Yes      Staff: Anesthesiologist, CRNA         No notable events documented      BP 98/64 (11/22/22 1047)    Temp     Pulse 80 (11/22/22 1047)   Resp 17 (11/22/22 1047)    SpO2 94 % (11/22/22 1047)

## 2022-11-22 NOTE — INTERVAL H&P NOTE
H&P reviewed  After examining the patient I find no changes in the patients condition since the H&P had been written      Vitals:    11/22/22 0918   BP: 124/75   Pulse: 75   Resp: 20   Temp: (!) 97 1 °F (36 2 °C)   SpO2: 94%

## 2022-11-23 ENCOUNTER — ANESTHESIA (OUTPATIENT)
Dept: PERIOP | Facility: HOSPITAL | Age: 57
End: 2022-11-23

## 2022-11-23 ENCOUNTER — HOSPITAL ENCOUNTER (INPATIENT)
Facility: HOSPITAL | Age: 57
LOS: 2 days | Discharge: HOME | End: 2022-11-25
Attending: SURGERY | Admitting: SURGERY

## 2022-11-23 ENCOUNTER — TELEPHONE (OUTPATIENT)
Dept: SURGERY | Facility: CLINIC | Age: 57
End: 2022-11-23

## 2022-11-23 DIAGNOSIS — B19.20 HEPATITIS C: ICD-10-CM

## 2022-11-23 DIAGNOSIS — Z93.3 COLOSTOMY PRESENT (HCC): Primary | ICD-10-CM

## 2022-11-23 DIAGNOSIS — J44.0 CHRONIC OBSTRUCTIVE PULMONARY DISEASE WITH ACUTE LOWER RESPIRATORY INFECTION (HCC): ICD-10-CM

## 2022-11-23 LAB
ANION GAP SERPL CALCULATED.3IONS-SCNC: 7 MMOL/L (ref 4–13)
BUN SERPL-MCNC: 8 MG/DL (ref 5–25)
CALCIUM SERPL-MCNC: 8.8 MG/DL (ref 8.3–10.1)
CHLORIDE SERPL-SCNC: 103 MMOL/L (ref 96–108)
CO2 SERPL-SCNC: 30 MMOL/L (ref 21–32)
CREAT SERPL-MCNC: 0.83 MG/DL (ref 0.6–1.3)
ERYTHROCYTE [DISTWIDTH] IN BLOOD BY AUTOMATED COUNT: 12.8 % (ref 11.6–15.1)
FLUAV RNA RESP QL NAA+PROBE: NEGATIVE
FLUBV RNA RESP QL NAA+PROBE: NEGATIVE
GFR SERPL CREATININE-BSD FRML MDRD: 98 ML/MIN/1.73SQ M
GLUCOSE SERPL-MCNC: 88 MG/DL (ref 65–140)
HCT VFR BLD AUTO: 47.7 % (ref 36.5–49.3)
HGB BLD-MCNC: 16 G/DL (ref 12–17)
MCH RBC QN AUTO: 31.1 PG (ref 26.8–34.3)
MCHC RBC AUTO-ENTMCNC: 33.5 G/DL (ref 31.4–37.4)
MCV RBC AUTO: 93 FL (ref 82–98)
PLATELET # BLD AUTO: 147 THOUSANDS/UL (ref 149–390)
PMV BLD AUTO: 10.1 FL (ref 8.9–12.7)
POTASSIUM SERPL-SCNC: 4.2 MMOL/L (ref 3.5–5.3)
RBC # BLD AUTO: 5.15 MILLION/UL (ref 3.88–5.62)
RSV RNA RESP QL NAA+PROBE: NEGATIVE
SARS-COV-2 RNA RESP QL NAA+PROBE: NEGATIVE
SODIUM SERPL-SCNC: 140 MMOL/L (ref 135–147)
WBC # BLD AUTO: 7.87 THOUSAND/UL (ref 4.31–10.16)

## 2022-11-23 PROCEDURE — 0DBE0ZZ EXCISION OF LARGE INTESTINE, OPEN APPROACH: ICD-10-PCS | Performed by: SURGERY

## 2022-11-23 PROCEDURE — 0DJD8ZZ INSPECTION OF LOWER INTESTINAL TRACT, VIA NATURAL OR ARTIFICIAL OPENING ENDOSCOPIC: ICD-10-PCS | Performed by: SURGERY

## 2022-11-23 RX ORDER — DEXAMETHASONE SODIUM PHOSPHATE 10 MG/ML
INJECTION, SOLUTION INTRAMUSCULAR; INTRAVENOUS AS NEEDED
Status: DISCONTINUED | OUTPATIENT
Start: 2022-11-23 | End: 2022-11-23

## 2022-11-23 RX ORDER — ONDANSETRON 2 MG/ML
4 INJECTION INTRAMUSCULAR; INTRAVENOUS ONCE AS NEEDED
Status: DISCONTINUED | OUTPATIENT
Start: 2022-11-23 | End: 2022-11-23 | Stop reason: HOSPADM

## 2022-11-23 RX ORDER — CEFAZOLIN SODIUM 1 G/50ML
1000 SOLUTION INTRAVENOUS ONCE
Status: COMPLETED | OUTPATIENT
Start: 2022-11-23 | End: 2022-11-23

## 2022-11-23 RX ORDER — ONDANSETRON 2 MG/ML
INJECTION INTRAMUSCULAR; INTRAVENOUS AS NEEDED
Status: DISCONTINUED | OUTPATIENT
Start: 2022-11-23 | End: 2022-11-23

## 2022-11-23 RX ORDER — SODIUM CHLORIDE, SODIUM LACTATE, POTASSIUM CHLORIDE, CALCIUM CHLORIDE 600; 310; 30; 20 MG/100ML; MG/100ML; MG/100ML; MG/100ML
125 INJECTION, SOLUTION INTRAVENOUS CONTINUOUS
Status: DISCONTINUED | OUTPATIENT
Start: 2022-11-23 | End: 2022-11-24

## 2022-11-23 RX ORDER — ONDANSETRON 2 MG/ML
4 INJECTION INTRAMUSCULAR; INTRAVENOUS EVERY 6 HOURS PRN
Status: DISCONTINUED | OUTPATIENT
Start: 2022-11-23 | End: 2022-11-25 | Stop reason: HOSPADM

## 2022-11-23 RX ORDER — NEOSTIGMINE METHYLSULFATE 1 MG/ML
INJECTION INTRAVENOUS AS NEEDED
Status: DISCONTINUED | OUTPATIENT
Start: 2022-11-23 | End: 2022-11-23

## 2022-11-23 RX ORDER — LABETALOL HYDROCHLORIDE 5 MG/ML
10 INJECTION, SOLUTION INTRAVENOUS EVERY 6 HOURS PRN
Status: DISCONTINUED | OUTPATIENT
Start: 2022-11-23 | End: 2022-11-25 | Stop reason: HOSPADM

## 2022-11-23 RX ORDER — KETOROLAC TROMETHAMINE 30 MG/ML
INJECTION, SOLUTION INTRAMUSCULAR; INTRAVENOUS AS NEEDED
Status: DISCONTINUED | OUTPATIENT
Start: 2022-11-23 | End: 2022-11-23

## 2022-11-23 RX ORDER — FENTANYL CITRATE 50 UG/ML
INJECTION, SOLUTION INTRAMUSCULAR; INTRAVENOUS AS NEEDED
Status: DISCONTINUED | OUTPATIENT
Start: 2022-11-23 | End: 2022-11-23

## 2022-11-23 RX ORDER — LIDOCAINE HYDROCHLORIDE 20 MG/ML
INJECTION, SOLUTION EPIDURAL; INFILTRATION; INTRACAUDAL; PERINEURAL AS NEEDED
Status: DISCONTINUED | OUTPATIENT
Start: 2022-11-23 | End: 2022-11-23

## 2022-11-23 RX ORDER — SODIUM CHLORIDE, SODIUM LACTATE, POTASSIUM CHLORIDE, CALCIUM CHLORIDE 600; 310; 30; 20 MG/100ML; MG/100ML; MG/100ML; MG/100ML
50 INJECTION, SOLUTION INTRAVENOUS CONTINUOUS
Status: DISCONTINUED | OUTPATIENT
Start: 2022-11-23 | End: 2022-11-25 | Stop reason: HOSPADM

## 2022-11-23 RX ORDER — PROPOFOL 10 MG/ML
INJECTION, EMULSION INTRAVENOUS AS NEEDED
Status: DISCONTINUED | OUTPATIENT
Start: 2022-11-23 | End: 2022-11-23

## 2022-11-23 RX ORDER — ROCURONIUM BROMIDE 10 MG/ML
INJECTION, SOLUTION INTRAVENOUS AS NEEDED
Status: DISCONTINUED | OUTPATIENT
Start: 2022-11-23 | End: 2022-11-23

## 2022-11-23 RX ORDER — HYDROMORPHONE HCL/PF 1 MG/ML
0.5 SYRINGE (ML) INJECTION
Status: DISCONTINUED | OUTPATIENT
Start: 2022-11-23 | End: 2022-11-25 | Stop reason: HOSPADM

## 2022-11-23 RX ORDER — METRONIDAZOLE 500 MG/100ML
500 INJECTION, SOLUTION INTRAVENOUS EVERY 8 HOURS
Status: DISCONTINUED | OUTPATIENT
Start: 2022-11-23 | End: 2022-11-23 | Stop reason: HOSPADM

## 2022-11-23 RX ORDER — LANOLIN ALCOHOL/MO/W.PET/CERES
3 CREAM (GRAM) TOPICAL
Status: DISCONTINUED | OUTPATIENT
Start: 2022-11-23 | End: 2022-11-25 | Stop reason: HOSPADM

## 2022-11-23 RX ORDER — OXYCODONE HYDROCHLORIDE 5 MG/1
5 TABLET ORAL EVERY 4 HOURS PRN
Status: DISCONTINUED | OUTPATIENT
Start: 2022-11-23 | End: 2022-11-24

## 2022-11-23 RX ORDER — HYDROMORPHONE HCL/PF 1 MG/ML
0.5 SYRINGE (ML) INJECTION
Status: DISCONTINUED | OUTPATIENT
Start: 2022-11-23 | End: 2022-11-23 | Stop reason: HOSPADM

## 2022-11-23 RX ORDER — FENTANYL CITRATE/PF 50 MCG/ML
25 SYRINGE (ML) INJECTION
Status: DISCONTINUED | OUTPATIENT
Start: 2022-11-23 | End: 2022-11-23 | Stop reason: HOSPADM

## 2022-11-23 RX ORDER — ACETAMINOPHEN 325 MG/1
650 TABLET ORAL EVERY 6 HOURS SCHEDULED
Status: DISCONTINUED | OUTPATIENT
Start: 2022-11-23 | End: 2022-11-25 | Stop reason: HOSPADM

## 2022-11-23 RX ORDER — MIDAZOLAM HYDROCHLORIDE 2 MG/2ML
INJECTION, SOLUTION INTRAMUSCULAR; INTRAVENOUS AS NEEDED
Status: DISCONTINUED | OUTPATIENT
Start: 2022-11-23 | End: 2022-11-23

## 2022-11-23 RX ORDER — METHOCARBAMOL 500 MG/1
500 TABLET, FILM COATED ORAL EVERY 6 HOURS SCHEDULED
Status: DISCONTINUED | OUTPATIENT
Start: 2022-11-23 | End: 2022-11-25 | Stop reason: HOSPADM

## 2022-11-23 RX ORDER — HEPARIN SODIUM 5000 [USP'U]/ML
5000 INJECTION, SOLUTION INTRAVENOUS; SUBCUTANEOUS EVERY 8 HOURS SCHEDULED
Status: DISCONTINUED | OUTPATIENT
Start: 2022-11-23 | End: 2022-11-25 | Stop reason: HOSPADM

## 2022-11-23 RX ORDER — MEPERIDINE HYDROCHLORIDE 25 MG/ML
12.5 INJECTION INTRAMUSCULAR; INTRAVENOUS; SUBCUTANEOUS
Status: DISCONTINUED | OUTPATIENT
Start: 2022-11-23 | End: 2022-11-23 | Stop reason: HOSPADM

## 2022-11-23 RX ORDER — GLYCOPYRROLATE 0.2 MG/ML
INJECTION INTRAMUSCULAR; INTRAVENOUS AS NEEDED
Status: DISCONTINUED | OUTPATIENT
Start: 2022-11-23 | End: 2022-11-23

## 2022-11-23 RX ORDER — OXYCODONE HYDROCHLORIDE 5 MG/1
10 TABLET ORAL EVERY 4 HOURS PRN
Status: DISCONTINUED | OUTPATIENT
Start: 2022-11-23 | End: 2022-11-24

## 2022-11-23 RX ORDER — GABAPENTIN 100 MG/1
100 CAPSULE ORAL 3 TIMES DAILY
Status: DISCONTINUED | OUTPATIENT
Start: 2022-11-23 | End: 2022-11-25 | Stop reason: HOSPADM

## 2022-11-23 RX ORDER — CALCIUM CARBONATE 200(500)MG
500 TABLET,CHEWABLE ORAL 3 TIMES DAILY PRN
Status: DISCONTINUED | OUTPATIENT
Start: 2022-11-23 | End: 2022-11-25 | Stop reason: HOSPADM

## 2022-11-23 RX ADMIN — GLYCOPYRROLATE 0.6 MG: 0.2 INJECTION, SOLUTION INTRAMUSCULAR; INTRAVENOUS at 11:46

## 2022-11-23 RX ADMIN — FENTANYL CITRATE 100 MCG: 50 INJECTION INTRAMUSCULAR; INTRAVENOUS at 10:50

## 2022-11-23 RX ADMIN — HEPARIN SODIUM 5000 UNITS: 5000 INJECTION INTRAVENOUS; SUBCUTANEOUS at 22:13

## 2022-11-23 RX ADMIN — SODIUM CHLORIDE, SODIUM LACTATE, POTASSIUM CHLORIDE, AND CALCIUM CHLORIDE 125 ML/HR: .6; .31; .03; .02 INJECTION, SOLUTION INTRAVENOUS at 07:51

## 2022-11-23 RX ADMIN — FENTANYL CITRATE 50 MCG: 50 INJECTION INTRAMUSCULAR; INTRAVENOUS at 11:30

## 2022-11-23 RX ADMIN — ONDANSETRON 4 MG: 2 INJECTION INTRAMUSCULAR; INTRAVENOUS at 11:35

## 2022-11-23 RX ADMIN — DEXAMETHASONE SODIUM PHOSPHATE 4 MG: 10 INJECTION INTRAMUSCULAR; INTRAVENOUS at 10:12

## 2022-11-23 RX ADMIN — ROCURONIUM BROMIDE 10 MG: 10 INJECTION, SOLUTION INTRAVENOUS at 10:50

## 2022-11-23 RX ADMIN — ROCURONIUM BROMIDE 40 MG: 10 INJECTION, SOLUTION INTRAVENOUS at 10:12

## 2022-11-23 RX ADMIN — CEFAZOLIN SODIUM 1000 MG: 1 SOLUTION INTRAVENOUS at 10:01

## 2022-11-23 RX ADMIN — FENTANYL CITRATE 50 MCG: 50 INJECTION INTRAMUSCULAR; INTRAVENOUS at 11:55

## 2022-11-23 RX ADMIN — HEPARIN SODIUM 5000 UNITS: 5000 INJECTION INTRAVENOUS; SUBCUTANEOUS at 14:09

## 2022-11-23 RX ADMIN — OXYCODONE 10 MG: 5 TABLET ORAL at 20:20

## 2022-11-23 RX ADMIN — KETOROLAC TROMETHAMINE 30 MG: 30 INJECTION, SOLUTION INTRAMUSCULAR at 11:35

## 2022-11-23 RX ADMIN — METRONIDAZOLE: 500 INJECTION, SOLUTION INTRAVENOUS at 10:09

## 2022-11-23 RX ADMIN — SODIUM CHLORIDE, SODIUM LACTATE, POTASSIUM CHLORIDE, AND CALCIUM CHLORIDE 125 ML/HR: 600; 310; 30; 20 INJECTION, SOLUTION INTRAVENOUS at 15:09

## 2022-11-23 RX ADMIN — MIDAZOLAM 2 MG: 1 INJECTION INTRAMUSCULAR; INTRAVENOUS at 10:05

## 2022-11-23 RX ADMIN — FENTANYL CITRATE 100 MCG: 50 INJECTION INTRAMUSCULAR; INTRAVENOUS at 10:12

## 2022-11-23 RX ADMIN — LIDOCAINE HYDROCHLORIDE 80 MG: 20 INJECTION, SOLUTION EPIDURAL; INFILTRATION; INTRACAUDAL; PERINEURAL at 10:12

## 2022-11-23 RX ADMIN — GABAPENTIN 100 MG: 100 CAPSULE ORAL at 15:10

## 2022-11-23 RX ADMIN — SODIUM CHLORIDE, SODIUM LACTATE, POTASSIUM CHLORIDE, AND CALCIUM CHLORIDE 125 ML/HR: 600; 310; 30; 20 INJECTION, SOLUTION INTRAVENOUS at 13:25

## 2022-11-23 RX ADMIN — SODIUM CHLORIDE, SODIUM LACTATE, POTASSIUM CHLORIDE, AND CALCIUM CHLORIDE 125 ML/HR: 600; 310; 30; 20 INJECTION, SOLUTION INTRAVENOUS at 22:16

## 2022-11-23 RX ADMIN — NEOSTIGMINE METHYLSULFATE 3.5 MG: 1 INJECTION INTRAVENOUS at 11:46

## 2022-11-23 RX ADMIN — GABAPENTIN 100 MG: 100 CAPSULE ORAL at 20:20

## 2022-11-23 RX ADMIN — PROPOFOL 180 MG: 10 INJECTION, EMULSION INTRAVENOUS at 10:12

## 2022-11-23 RX ADMIN — SODIUM CHLORIDE, SODIUM LACTATE, POTASSIUM CHLORIDE, AND CALCIUM CHLORIDE: .6; .31; .03; .02 INJECTION, SOLUTION INTRAVENOUS at 10:52

## 2022-11-23 NOTE — INTERVAL H&P NOTE
H&P reviewed  After examining the patient I find no changes in the patients condition since the H&P had been written  Pt is aware that this could also be a partially open surgery or require partial segment resection of the sigmoid colon if the loop colostomy cannot be taken down slowly from the service and reanastomosed  We may need to convert to open and or at open assisted reversal of this colostomy  He is aware and agrees to the plan    Vitals:    11/23/22 0731   BP: 107/72   Pulse: 71   Resp: 16   Temp: 97 6 °F (36 4 °C)   SpO2: 95%

## 2022-11-23 NOTE — H&P
Assessment/Plan:   Adelaida Foster is a 65 y o male who comes in today for postoperative check after check after exploratory laparotomy with bowel resection for perforated bowel and rectum due to foreign body with ileostomy   On September 26, 2022  He has a loop sigmoid colostomy     The wound now has completely healed  He is anxious to return to work due to financial situation  Patient is pleased with the outcome of surgery and is doing well  Plan  1  Colonoscopy  2  Reversal of loop sigmoid colostomy the day after with a bowel prep     ____________________________________________________________    HPI:  Adelaida Foster is a 65 y o male who comes in today for postoperative check after recent surgery  Currently doing well with some problems : open wound with drainage, no fever or chills,no nausea and no vomiting  Reports colostomy functional   Some minimal drainage from wound  ROS:  General ROS: negative for - chills, fatigue, fever or night sweats, weight loss  Respiratory ROS: no cough, shortness of breath, or wheezing  Cardiovascular ROS: no chest pain or dyspnea on exertion  Genito-Urinary ROS: no dysuria, trouble voiding, or hematuria  Musculoskeletal ROS: negative for - gait disturbance, joint pain or muscle pain  Neurological ROS: no TIA or stroke symptoms  GI ROS: see HPI  Skin ROS: no new rashes or lesions   Lymphatic ROS: no new adenopathy noted by pt     GYN ROS: see HPI, no new GYN history or bleeding noted  Psy ROS: no new mental or behavioral disturbances       Patient Active Problem List   Diagnosis   • Asthma   • Hepatitis C   • COPD (chronic obstructive pulmonary disease) ([facility] )   • Colostomy present ([facility])         Allergies:  Bactrim [sulfamethoxazole-trimethoprim], Ciprofloxacin, Dust mite extract, and Sulfa antibiotics      Current Facility-Administered Medications:   •  ceFAZolin (ANCEF) IVPB (premix in dextrose) 1,000 mg 50 mL, 1,000 mg, Intravenous, Once, Soniya Schuster MD  •  lactated ringers infusion, 125 mL/hr, Intravenous, Continuous, Marina Barthel Bowen, DO, Last Rate: 125 mL/hr at 11/23/22 0751, 125 mL/hr at 11/23/22 0751  •  metroNIDAZOLE (FLAGYL) IVPB (premix) 500 mg 100 mL, 500 mg, Intravenous, Q8H, Zen Carpenter MD    Facility-Administered Medications Ordered in Other Encounters:   •  sodium chloride 0 9 % infusion, 125 mL/hr, Intravenous, Continuous, Fremont Seat, DO, New Bag at 11/22/22 2069    Past Medical History:   Diagnosis Date   • Asthma    • Bronchitis    • COPD (chronic obstructive pulmonary disease) ([facility] [address] )    • COVID 10/2021   • Hepatitis C    • Infectious viral hepatitis    • Liver disease    • Pneumonia    • Tinnitus        Past Surgical History:   Procedure Laterality Date   • COLOSTOMY N/A 09/26/2022    Procedure: COLOSTOMY END;  Surgeon: Zen Carpenter MD;  [facility];  Service: General   • COLOSTOMY N/A 09/26/2022    Procedure: COLOSTOMY LOOP;  Surgeon: Zen Carpenter MD;  [facility];  Service: General   • KNEE ARTHROSCOPY Left    • LAPAROTOMY N/A 09/26/2022    Procedure: LAPAROTOMY EXPLORATORY, LYSIS OF ADHESIONS, LOW ANTERIOR RESECTION WITH PRIMARY ANASTOMOSIS;  Surgeon: Zen Carpenter MD;  [facility];  Service: General   • MOUTH SURGERY     • CO PART REMOVAL COLON W ANASTOMOSIS N/A 09/26/2022    Procedure: RESECTION COLON SIGMOID;  Surgeon: Zen Carpenter MD;  [facility];  Service: General   • CO PROCTOSIGMOIDOSCOPY,RIGID,DIAGNOS N/A 09/26/2022    Procedure: [place] Hitch RIGID;  Surgeon: Zen Carpenter MD;  [facility];  Service: General       History reviewed  No pertinent family history  reports that he has been smoking cigarettes  He has been smoking an average of  25 packs per day  He has never used smokeless tobacco  He reports that he does not currently use alcohol   He reports that he does not currently use drugs after having used the following drugs: Marijuana, Heroin, Methamphetamines, and Fentanyl  Invalid input(s):  EOSPCT          Invalid input(s): LABALBU    Imaging: no new    Vitals:    11/23/22 0731   BP: 107/72   Pulse: 71   Resp: 16   Temp: 97 6 °F (36 4 °C)   SpO2: 95%        PHYSICAL EXAM  General: normal, cooperative, no distress  Incision: with serous drainage and open at distal and proximal aspect  Physical Exam  Skin:                 Some portions of this record may have been generated with voice recognition software  There may be translation, syntax,  or grammatical errors  Occasional wrong word or "sound-a-like" substitutions may have occurred due to the inherent limitations of the voice recognition software  Read the chart carefully and recognize, using context, where substitutions may have occurred  If you have any questions, please contact the dictating provider for clarification or correction, as needed  This encounter has been coded by a non-certified coder         No name on file    Date: 11/23/2022 Time: 9:12 AM

## 2022-11-23 NOTE — ANESTHESIA POSTPROCEDURE EVALUATION
Post-Op Assessment Note    CV Status:  Stable    Pain management: adequate     Mental Status:  Alert and awake   Hydration Status:  Euvolemic   PONV Controlled:  Controlled   Airway Patency:  Patent   Two or more mitigation strategies used for obstructive sleep apnea   Post Op Vitals Reviewed: Yes      Staff: Anesthesiologist         No notable events documented      BP      Temp      Pulse     Resp      SpO2      /72   Pulse 56   Temp 97 6 °F (36 4 °C)   Resp 18   Ht 5' 11" (1 803 m)   Wt 77 3 kg (170 lb 6 7 oz)   SpO2 96%   BMI 23 77 kg/m²

## 2022-11-23 NOTE — CASE MANAGEMENT
Case Management Discharge Planning Note    Patient name Ethan Tavares  Location [facility] /E5 -* MRN [id number]  : 1965 Date 2022       Current Admission Date: 2022  Current Admission Diagnosis:Colostomy present [facility])   Patient Active Problem List    Diagnosis Date Noted   • Colostomy present ([facility] ) 10/31/2022   • Asthma    • Hepatitis C    • COPD (chronic obstructive pulmonary disease) ([facility] )       LOS (days): 0  Geometric Mean LOS (GMLOS) (days):   Days to GMLOS:     OBJECTIVE:            Current admission status: Inpatient   Preferred Pharmacy:   [id number] [place] [address]  [address] [place] [address] [address] [facility] [address]  Phone: [phone] Fax: [phone]    [place] [address]  Phone: [phone] Fax: [phone]    [id number] [address], [facility] [place] [address] [address] [address]  Phone: [phone] Fax: [phone]    Primary Care Provider: Denzel Cano MD    Primary Insurance: [id number] [facility]  Secondary Insurance:     DISCHARGE DETAILS:    CM consulted for post acute placement  Patient pending PT/OT evaluation and recommendations  CM will continue to follow

## 2022-11-23 NOTE — OP NOTE
OPERATIVE REPORT  PATIENT NAME: Kodak Vera    :  1965  MRN: [id number]  Pt Location: [facility] ROOM 08    SURGERY DATE: 2022    Surgeon(s) and Role:     * Marlo Villasenor MD - Primary    Preop Diagnosis:  Perforated rectum ([address] ) [K63 1]  Colostomy present ([address] ) [Z93 3]    Post-Op Diagnosis Codes:     * Perforated rectum ([address] ) [K63 1]     * Colostomy present ([address] ) [Z93 3]    Procedure(s) (LRB):  CLOSURE COLOSTOMY, LYSIS OF ADEHSIONS, RIGID SIGMOIDOSCOPY (N/A)    Specimen(s):  * No specimens in log *    Estimated Blood Loss:   Minimal    Drains:  Colostomy Loop LLQ (Active)   Number of days: 58       Anesthesia Type:   Choice    Operative Indications:  Perforated rectum ([address] ) [K63 1]  Colostomy present ([address] ) [Z93 3]      Operative Findings:  Adhesions at loop colostomy site taken down sharply    Good bleeding colon with healthy ends - adequate for anastomosis    Rigid proctoscopy performed and anastomosis visualized, stressed with bubble test and negative    Complications:   None    Procedure and Technique:  Patient was brought to the operative suite where he was identified both verbally and by wrist band  He was transferred to the OR table where anesthesia was induced via endotracheal intubation per the anesthesiologist   The patient was then prepped and draped in the usual sterile fashion with Betadine  A time-out was held and all were in agreement  A circumferential incision around the loop sigmoid colostomy was performed with Bovie electrocautery and dissected down through the subcutaneous tissues circumferentially to free up the well placed colostomy  A clearly defined plane was entered and we were able to dissect to the posterior fascia and circumferentially lyse adhesions to free up the entire loop colostomy  Once freed we inspected the bowel and noted it to be adequate for reanastomosis      The brooked portion of the loop colostomy was then taken down sharply with Golden scissors and unfolded  The portion that had scarred to the subcutaneous tissues and skin was resected sharply with scissors until good healthy serosa and mucosa was visible and bleeding  The bleeding was controlled with Bovie electrocautery for hemostasis  Because there was adequate length external to the abdomen we decided to perform a hand-sewn anastomosis  Using 2x 3-0 PDS suture we performed the inner layer anastomosis via the Rachel suturing technique from either end and securing in the middle  We then performed our outer layer closure with 3-0 silk suture in an interrupted fashion  The anastomosis was then felt and found to be widely patent  We did perform a rigid proctoscopy and an external bowl test to stress the bowel which was negative  The anastomosis was then returned to the abdomen and was able to be visualized on rigid proctoscopy as widely patent  We then changed our gown and gloves in a sterile manner for closure  The posterior fascia and peritoneum was closed with a running 0 Vicryl in the anterior fascia was then closed with a running 1  PDS  The subcutaneous tissues were then irrigated with sterile saline  The skin was then closed with 2-0 nylon in a vertical mattress fashion with Betadine-soaked chel placed in the loose approximation  At the end of the case the instrument count was correct x2 and RF Wand showed no retained sponges  Patient was then awoken, extubated and transferred to the PACU in stable condition per Anesthesia  Dr Juan Deshpande was present for the entire procedure        Patient Disposition:  PACU           SIGNATURE: Shanel Walton MD  DATE: November 23, 2022  TIME: 12:06 PM

## 2022-11-23 NOTE — PLAN OF CARE
Problem: PAIN - ADULT  Goal: Verbalizes/displays adequate comfort level or baseline comfort level  Description: Interventions:  - Encourage patient to monitor pain and request assistance  - Assess pain using appropriate pain scale  - Administer analgesics based on type and severity of pain and evaluate response  - Implement non-pharmacological measures as appropriate and evaluate response  - Consider cultural and social influences on pain and pain management  - Notify physician/advanced practitioner if interventions unsuccessful or patient reports new pain  Outcome: Progressing     Problem: INFECTION - ADULT  Goal: Absence or prevention of progression during hospitalization  Description: INTERVENTIONS:  - Assess and monitor for signs and symptoms of infection  - Monitor lab/diagnostic results  - Monitor all insertion sites, i e  indwelling lines, tubes, and drains  - Monitor endotracheal if appropriate and nasal secretions for changes in amount and color  - [place] appropriate cooling/warming therapies per order  - Administer medications as ordered  - Instruct and encourage patient and family to use good hand hygiene technique  - Identify and instruct in appropriate isolation precautions for identified infection/condition  Outcome: Progressing     Problem: SAFETY ADULT  Goal: Patient will remain free of falls  Description: INTERVENTIONS:  - Educate patient/family on patient safety including physical limitations  - Instruct patient to call for assistance with activity   - Consult OT/PT to assist with strengthening/mobility   - Keep Call bell within reach  - Keep bed low and locked with side rails adjusted as appropriate  - Keep care items and personal belongings within reach  - Initiate and maintain comfort rounds  - Make Fall Risk Sign visible to staff  - Apply yellow socks and bracelet for high fall risk patients  - Consider moving patient to room near nurses station  Outcome: Progressing     Problem: DISCHARGE PLANNING  Goal: Discharge to home or other facility with appropriate resources  Description: INTERVENTIONS:  - Identify barriers to discharge w/patient and caregiver  - Arrange for needed discharge resources and transportation as appropriate  - Identify discharge learning needs (meds, wound care, etc )  - Arrange for interpretive services to assist at discharge as needed  - Refer to Case Management Department for coordinating discharge planning if the patient needs post-hospital services based on physician/advanced practitioner order or complex needs related to functional status, cognitive ability, or social support system  Outcome: Progressing     Problem: Knowledge Deficit  Goal: Patient/family/caregiver demonstrates understanding of disease process, treatment plan, medications, and discharge instructions  Description: Complete learning assessment and assess knowledge base    Interventions:  - Provide teaching at level of understanding  - Provide teaching via preferred learning methods  Outcome: Progressing     Problem: GENITOURINARY - ADULT  Goal: Maintains or returns to baseline urinary function  Description: INTERVENTIONS:  - Assess urinary function  - Encourage oral fluids to ensure adequate hydration if ordered  - Administer IV fluids as ordered to ensure adequate hydration  - Administer ordered medications as needed  - Offer frequent toileting  - Follow urinary retention protocol if ordered  Outcome: Progressing  Goal: Absence of urinary retention  Description: INTERVENTIONS:  - Assess patient’s ability to void and empty bladder  - Monitor I/O  - Bladder scan as needed  - Discuss with physician/AP medications to alleviate retention as needed  - Discuss catheterization for long term situations as appropriate  Outcome: Progressing     Problem: SKIN/TISSUE INTEGRITY - ADULT  Goal: Skin Integrity remains intact(Skin Breakdown Prevention)  Description: Assess:  -Perform Matthew assessment every shift  -Clean and moisturize skin every shift  -Inspect skin when repositioning, toileting, and assisting with ADLS  -Assess extremities for adequate circulation and sensation     Bed Management:  -Have minimal linens on bed & keep smooth, unwrinkled  -Change linens as needed when moist or perspiring  -Avoid sitting or lying in one position for more than 2 hours while in bed  -Keep HOB at 30 degrees     Toileting:  -Offer bedside commode    Activity:  -Mobilize patient 4 times a day  -Encourage activity and walks on unit   -Turn and reposition patient every 2 Hours  -Use appropriate equipment to lift or move patient in bed  -Instruct/ Assist with weight shifting every 2 hours when out of bed in chair  -Consider limitation of chair time 2 hour intervals    Skin Care:  -Avoid use of baby powder, tape, friction and shearing, hot water or constrictive clothing    Next Steps:  -Teach patient strategies to minimize risks such as incentive spirometer  Outcome: Progressing  Goal: Incision(s), wounds(s) or drain site(s) healing without S/S of infection  Description: INTERVENTIONS  - Assess and document dressing, incision, wound bed, drain sites and surrounding tissue  - Provide patient and family education  Outcome: Progressing

## 2022-11-23 NOTE — ANESTHESIA PREPROCEDURE EVALUATION
Procedure:  REVERSAL COLOSTOMY LOOP (Abdomen)    Relevant Problems   ANESTHESIA (within normal limits)      GI/HEPATIC   (+) Hepatitis C      NEURO/PSYCH  polysubstance abuse      PULMONARY   (+) Asthma   (+) COPD (chronic obstructive pulmonary disease) (HCC)        Physical Exam    Airway    Mallampati score: II  TM Distance: >3 FB  Neck ROM: full     Dental   No notable dental hx     Cardiovascular  Rhythm: regular, Rate: normal, Cardiovascular exam normal    Pulmonary  Pulmonary exam normal Breath sounds clear to auscultation,     Other Findings        Anesthesia Plan  ASA Score- 3     Anesthesia Type- general with ASA Monitors  Additional Monitors:   Airway Plan: ETT  Plan Factors-    Chart reviewed  EKG reviewed  Existing labs reviewed  Patient summary reviewed  Patient is a current smoker  Patient instructed to abstain from smoking on day of procedure  Patient smoked on day of surgery  Obstructive sleep apnea risk education given perioperatively  Induction- intravenous  Postoperative Plan- Plan for postoperative opioid use  Planned trial extubation    Informed Consent- Anesthetic plan and risks discussed with patient

## 2022-11-23 NOTE — TELEPHONE ENCOUNTER
S/P = COLONOSCOPY = 11/22/2022    Unable to reach patient, he is currently admitted due to having surgical procedure  Path pending

## 2022-11-24 LAB
ANION GAP SERPL CALCULATED.3IONS-SCNC: 9 MMOL/L (ref 4–13)
BASOPHILS # BLD AUTO: 0.03 THOUSANDS/ÂΜL (ref 0–0.1)
BASOPHILS NFR BLD AUTO: 0 % (ref 0–1)
BUN SERPL-MCNC: 8 MG/DL (ref 5–25)
CALCIUM SERPL-MCNC: 8.8 MG/DL (ref 8.3–10.1)
CHLORIDE SERPL-SCNC: 106 MMOL/L (ref 96–108)
CO2 SERPL-SCNC: 26 MMOL/L (ref 21–32)
CREAT SERPL-MCNC: 0.8 MG/DL (ref 0.6–1.3)
EOSINOPHIL # BLD AUTO: 0.12 THOUSAND/ÂΜL (ref 0–0.61)
EOSINOPHIL NFR BLD AUTO: 2 % (ref 0–6)
ERYTHROCYTE [DISTWIDTH] IN BLOOD BY AUTOMATED COUNT: 12.5 % (ref 11.6–15.1)
GFR SERPL CREATININE-BSD FRML MDRD: 99 ML/MIN/1.73SQ M
GLUCOSE SERPL-MCNC: 120 MG/DL (ref 65–140)
HCT VFR BLD AUTO: 38.8 % (ref 36.5–49.3)
HGB BLD-MCNC: 14 G/DL (ref 12–17)
IMM GRANULOCYTES # BLD AUTO: 0.04 THOUSAND/UL (ref 0–0.2)
IMM GRANULOCYTES NFR BLD AUTO: 1 % (ref 0–2)
LYMPHOCYTES # BLD AUTO: 1.83 THOUSANDS/ÂΜL (ref 0.6–4.47)
LYMPHOCYTES NFR BLD AUTO: 22 % (ref 14–44)
MCH RBC QN AUTO: 32.2 PG (ref 26.8–34.3)
MCHC RBC AUTO-ENTMCNC: 36.1 G/DL (ref 31.4–37.4)
MCV RBC AUTO: 89 FL (ref 82–98)
MONOCYTES # BLD AUTO: 0.88 THOUSAND/ÂΜL (ref 0.17–1.22)
MONOCYTES NFR BLD AUTO: 11 % (ref 4–12)
NEUTROPHILS # BLD AUTO: 5.26 THOUSANDS/ÂΜL (ref 1.85–7.62)
NEUTS SEG NFR BLD AUTO: 64 % (ref 43–75)
NRBC BLD AUTO-RTO: 0 /100 WBCS
PLATELET # BLD AUTO: 183 THOUSANDS/UL (ref 149–390)
PMV BLD AUTO: 11.2 FL (ref 8.9–12.7)
POTASSIUM SERPL-SCNC: 3.9 MMOL/L (ref 3.5–5.3)
RBC # BLD AUTO: 4.35 MILLION/UL (ref 3.88–5.62)
SODIUM SERPL-SCNC: 141 MMOL/L (ref 135–147)
WBC # BLD AUTO: 8.16 THOUSAND/UL (ref 4.31–10.16)

## 2022-11-24 RX ORDER — KETOROLAC TROMETHAMINE 30 MG/ML
15 INJECTION, SOLUTION INTRAMUSCULAR; INTRAVENOUS EVERY 6 HOURS PRN
Status: DISCONTINUED | OUTPATIENT
Start: 2022-11-24 | End: 2022-11-25 | Stop reason: HOSPADM

## 2022-11-24 RX ADMIN — ACETAMINOPHEN 650 MG: 325 TABLET, FILM COATED ORAL at 05:25

## 2022-11-24 RX ADMIN — METHOCARBAMOL 500 MG: 500 TABLET ORAL at 05:41

## 2022-11-24 RX ADMIN — KETOROLAC TROMETHAMINE 15 MG: 30 INJECTION, SOLUTION INTRAMUSCULAR; INTRAVENOUS at 08:45

## 2022-11-24 RX ADMIN — HEPARIN SODIUM 5000 UNITS: 5000 INJECTION INTRAVENOUS; SUBCUTANEOUS at 21:52

## 2022-11-24 RX ADMIN — SODIUM CHLORIDE, SODIUM LACTATE, POTASSIUM CHLORIDE, AND CALCIUM CHLORIDE 125 ML/HR: 600; 310; 30; 20 INJECTION, SOLUTION INTRAVENOUS at 05:45

## 2022-11-24 RX ADMIN — KETOROLAC TROMETHAMINE 15 MG: 30 INJECTION, SOLUTION INTRAMUSCULAR; INTRAVENOUS at 17:18

## 2022-11-24 RX ADMIN — GABAPENTIN 100 MG: 100 CAPSULE ORAL at 17:18

## 2022-11-24 RX ADMIN — GABAPENTIN 100 MG: 100 CAPSULE ORAL at 08:45

## 2022-11-24 RX ADMIN — GABAPENTIN 100 MG: 100 CAPSULE ORAL at 21:53

## 2022-11-24 NOTE — OCCUPATIONAL THERAPY NOTE
Occupational Therapy Evaluation     Patient Name: Estela Griggs  [id number] Date: 11/24/2022  Problem List  Principal Problem:    Colostomy present [facility])    Past Medical History  Past Medical History:   Diagnosis Date    Asthma     Bronchitis     COPD (chronic obstructive pulmonary disease) ([facility] [address] )     COVID 10/2021    Hepatitis C     Infectious viral hepatitis     Liver disease     Pneumonia     Tinnitus      Past Surgical History  Past Surgical History:   Procedure Laterality Date    COLOSTOMY N/A 09/26/2022    Procedure: COLOSTOMY END;  Surgeon: Dex Stanley MD;  [facility];  Service: General    COLOSTOMY N/A 09/26/2022    Procedure: COLOSTOMY LOOP;  Surgeon: Dex Stanley MD;  [facility];  Service: General    KNEE ARTHROSCOPY Left     LAPAROTOMY N/A 09/26/2022    Procedure: LAPAROTOMY EXPLORATORY, LYSIS OF ADHESIONS, LOW ANTERIOR RESECTION WITH PRIMARY ANASTOMOSIS;  Surgeon: Dex Stanley MD;  [facility];  Service: General    MOUTH SURGERY      ME PART REMOVAL COLON W ANASTOMOSIS N/A 09/26/2022    Procedure: RESECTION COLON SIGMOID;  Surgeon: Dex Stanley MD;  [facility];  Service: General    ME PROCTOSIGMOIDOSCOPY,RIGID,DIAGNOS N/A 09/26/2022    Procedure: Brandon Flaming RIGID;  Surgeon: Dex Stanley MD;  [facility];  Service: General         11/24/22 0808   OT Last Visit   OT Visit Date 11/24/22   Note Type   Note type Evaluation  (Discharge)   Additional Comments Pt presents supine in bed, agreeable to OT/PT eval    Pain Assessment   Pain Assessment Tool 0-10   Pain Score 5   Pain Location/Orientation Location: Abdomen   Hospital Pain Intervention(s) Repositioned; Ambulation/increased activity; Emotional support   Restrictions/Precautions   Other Precautions Multiple lines;Pain   Home Living   Type of Home Other (Comment)  (recovery house)   Home Layout Multi-level;Bed/bath upstairs   Bathroom Shower/Tub Tub/shower unit   [employer] Standard   Additional Comments 0 LEORA  FOS to bedroom  bathroom on both levels   Prior Function   Level of [place] Independent with ADLs; Independent with functional mobility; Independent with IADLS   Lives With Other (Comment)  (several roommates)   IADLs Independent with meal prep; Independent with medication management   Falls in the last 6 months 0   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   Supine to Sit 7  Independent   Sit to Supine 7  Independent   Transfers   Sit to Stand 7  Independent   Stand to Sit 7  Independent   Functional Mobility   Functional Mobility 7  Independent   Balance   Static Sitting Normal   Dynamic Sitting Normal   Static Standing Good   Dynamic Standing Fair +   Ambulatory Fair +   Activity Tolerance   Activity Tolerance Patient tolerated treatment well   Medical Staff Made Aware Uma PT   Nurse Made Aware Karen JORGENSEN   RUE Assessment   RUE Assessment WNL   LUE Assessment   LUE Assessment WNL   Hand Function   Gross Motor Coordination Functional   Fine Motor Coordination Functional   Sensation   Light Touch No apparent deficits   Proprioception   Proprioception No apparent deficits   Vision-Basic Assessment   Current Vision No visual deficits   Vision - Complex Assessment   Ocular Range of Motion Intact   Acuity Able to read employee name badge without difficulty; Able to read clock/calendar on wall without difficulty   Perception Inattention/Neglect Appears intact   Cognition   Overall Cognitive Status WFL   Arousal/Participation Alert; Responsive; Cooperative   Attention Within functional limits   Orientation Level Oriented X4   Memory Within functional limits   Following Commands Follows all commands and directions without difficulty   Assessment   Assessment Patient is a 65y o  year old male seen for OT eval s/p admit to [facility] on 11/23/2022 s/p closure colostomy, lysis of adhesions, rigid sigmoidoscopy  Comorbidities affecting pt’s functional performance include a significant PMH of: Asthma, Hep C, COPD  Patient with active OT orders and activity orders for Activity as tolerated  Prior to admission, patient was (I) with ADLs/IADLs  Pt continues to be functioning at baseline of (I)  No skilled OT need at this time, will DC OT orders  Available for re-consultation if needed  Co treatment with PT secondary to complex medical condition of pt, possible A of 2 required to achieve and maintain transitional movements, requiring the need of skilled therapeutic intervention of 2 therapists to achieve delivery of services     Goals   Patient Goals to feel better   Plan   OT Frequency Eval only   Recommendation   OT Discharge Recommendation No rehabilitation needs   AM-PAC Daily Activity Inpatient   Lower Body Dressing 4   Bathing 4   Toileting 4   Upper Body Dressing 4   Grooming 4   Eating 4   Daily Activity Raw Score 24   Daily Activity Standardized Score (Calc for Raw Score >=11) 57 54   AM-PAC Applied Cognition Inpatient   Following a Speech/Presentation 4   Understanding Ordinary Conversation 4   Taking Medications 4   Remembering Where Things Are Placed or Put Away 4   Remembering List of 4-5 Errands 4   Taking Care of Complicated Tasks 4   Applied Cognition Raw Score 24   Applied Cognition Standardized Score 62 21     Rula Cano

## 2022-11-24 NOTE — PROGRESS NOTES
Progress Note - general Surgery  [place] Gaucher 65 y o  male MRN: [id number]  Unit/Bed#: E5 -01 Encounter: [id number]    Assessment:  65 y o  male with history of hearing abuse, and rectal bleed requiring a bowel resection and formation of colostomy, is now 1 Day Post-Op s/p Procedure(s) (LRB):  CLOSURE COLOSTOMY, LYSIS OF ADEHSIONS, RIGID SIGMOIDOSCOPY (N/A)    Plan:  - Diet Surgical; Clear Liquid; No Carbonation currently  - Pain and Nausea control PRN  Will controlled with nonopioid medication  - Incentive spirometry  - OOB, ambulate  - keep chel until 11/26  -     Subjective/Objective     Subjective:  Patient uncomfortable with pain overnight due to not wanting to take pain medications  Patient was agreeable once it was explained to him that there are non opioid options to control his pain  Denies any nausea or vomiting  No flatus or bowel move    Objective:   Vitals: Blood pressure (!) 89/54, pulse 60, temperature 98 4 °F (36 9 °C), resp  rate 18, height 5' 11" (1 803 m), weight 77 3 kg (170 lb 6 7 oz), SpO2 93 %  ,Body mass index is 23 77 kg/m²  I/O       11/22 0701 11/23 0700 11/23 0701  11/24 0700 11/24 0701 11/25 0700    I V  (mL/kg)  2250 (29 1)     IV Piggyback  100     Total Intake(mL/kg)  2350 (30 4)     Urine (mL/kg/hr)  2 (0)     Total Output  2     Net  +2348                  Physical Exam:  Gen: NAD, Aox3, Comfortable in bed  Chest: Normal work of breathing, no respiratory distress  Abd: Soft, ND, appropriately tender at site of previous ostomy  No rebound or guarding  Ext: No Edema  Skin: Warm, Dry, Intact      Lab, Imaging and other studies:   I have personally reviewed pertinent reports    , CBC with diff:   Lab Results   Component Value Date    WBC 8 16 11/24/2022    HGB 14 0 11/24/2022    HCT 38 8 11/24/2022    MCV 89 11/24/2022     11/24/2022    MCH 32 2 11/24/2022    MCHC 36 1 11/24/2022    RDW 12 5 11/24/2022    MPV 11 2 11/24/2022    NRBC 0 11/24/2022   , BMP/CMP:   Lab Results   Component Value Date    SODIUM 141 11/24/2022    K 3 9 11/24/2022     11/24/2022    CO2 26 11/24/2022    BUN 8 11/24/2022    CREATININE 0 80 11/24/2022    CALCIUM 8 8 11/24/2022    EGFR 99 11/24/2022     VTE Pharmacologic Prophylaxis: Heparin  VTE Mechanical Prophylaxis: sequential compression device        Michelle Macario MD  11/24/2022  7:32 AM

## 2022-11-24 NOTE — PLAN OF CARE
Problem: PAIN - ADULT  Goal: Verbalizes/displays adequate comfort level or baseline comfort level  Description: Interventions:  - Encourage patient to monitor pain and request assistance  - Assess pain using appropriate pain scale  - Administer analgesics based on type and severity of pain and evaluate response  - Implement non-pharmacological measures as appropriate and evaluate response  - Consider cultural and social influences on pain and pain management  - Notify physician/advanced practitioner if interventions unsuccessful or patient reports new pain  11/24/2022 1055 by Carolyn Lainez RN  Outcome: Progressing  11/24/2022 1055 by Carolyn Lainez RN  Outcome: Progressing     Problem: INFECTION - ADULT  Goal: Absence or prevention of progression during hospitalization  Description: INTERVENTIONS:  - Assess and monitor for signs and symptoms of infection  - Monitor lab/diagnostic results  - Monitor all insertion sites, i e  indwelling lines, tubes, and drains  - Monitor endotracheal if appropriate and nasal secretions for changes in amount and color  - [place] appropriate cooling/warming therapies per order  - Administer medications as ordered  - Instruct and encourage patient and family to use good hand hygiene technique  - Identify and instruct in appropriate isolation precautions for identified infection/condition  11/24/2022 1055 by Carolyn Lainez RN  Outcome: Progressing  11/24/2022 1055 by Carolyn Lainez RN  Outcome: Progressing     Problem: SAFETY ADULT  Goal: Patient will remain free of falls  Description: INTERVENTIONS:  - Educate patient/family on patient safety including physical limitations  - Instruct patient to call for assistance with activity   - Consult OT/PT to assist with strengthening/mobility   - Keep Call bell within reach  - Keep bed low and locked with side rails adjusted as appropriate  - Keep care items and personal belongings within reach  - Initiate and maintain comfort rounds  - Make Fall Risk Sign visible to staff  - Apply yellow socks and bracelet for high fall risk patients  - Consider moving patient to room near nurses station  11/24/2022 1055 by Joanna Coelho RN  Outcome: Progressing  11/24/2022 1055 by Joanna Coelho RN  Outcome: Progressing     Problem: DISCHARGE PLANNING  Goal: Discharge to home or other facility with appropriate resources  Description: INTERVENTIONS:  - Identify barriers to discharge w/patient and caregiver  - Arrange for needed discharge resources and transportation as appropriate  - Identify discharge learning needs (meds, wound care, etc )  - Arrange for interpretive services to assist at discharge as needed  - Refer to Case Management Department for coordinating discharge planning if the patient needs post-hospital services based on physician/advanced practitioner order or complex needs related to functional status, cognitive ability, or social support system  11/24/2022 1055 by Joanna Coelho RN  Outcome: Progressing  11/24/2022 1055 by Joanna Coelho RN  Outcome: Progressing     Problem: Knowledge Deficit  Goal: Patient/family/caregiver demonstrates understanding of disease process, treatment plan, medications, and discharge instructions  Description: Complete learning assessment and assess knowledge base    Interventions:  - Provide teaching at level of understanding  - Provide teaching via preferred learning methods  11/24/2022 1055 by Joanna Coelho RN  Outcome: Progressing  11/24/2022 1055 by Joanna Coelho RN  Outcome: Progressing     Problem: GENITOURINARY - ADULT  Goal: Maintains or returns to baseline urinary function  Description: INTERVENTIONS:  - Assess urinary function  - Encourage oral fluids to ensure adequate hydration if ordered  - Administer IV fluids as ordered to ensure adequate hydration  - Administer ordered medications as needed  - Offer frequent toileting  - Follow urinary retention protocol if ordered  11/24/2022 1055 by Shaila Gerard RN  Outcome: Progressing  11/24/2022 1055 by Shaila Gerard RN  Outcome: Progressing  Goal: Absence of urinary retention  Description: INTERVENTIONS:  - Assess patient’s ability to void and empty bladder  - Monitor I/O  - Bladder scan as needed  - Discuss with physician/AP medications to alleviate retention as needed  - Discuss catheterization for long term situations as appropriate  11/24/2022 1055 by Shaila Gerard RN  Outcome: Progressing  11/24/2022 1055 by Shaila Gerard RN  Outcome: Progressing     Problem: SKIN/TISSUE INTEGRITY - ADULT  Goal: Skin Integrity remains intact(Skin Breakdown Prevention)  Description: Assess:  -Perform Matthew assessment every shift  -Clean and moisturize skin every shift  -Inspect skin when repositioning, toileting, and assisting with ADLS  -Assess extremities for adequate circulation and sensation     Bed Management:  -Have minimal linens on bed & keep smooth, unwrinkled  -Change linens as needed when moist or perspiring  -Avoid sitting or lying in one position for more than 2 hours while in bed  -Keep HOB at 30 degrees     Toileting:  -Offer bedside commode    Activity:  -Mobilize patient 4 times a day  -Encourage activity and walks on unit   -Turn and reposition patient every 2 Hours  -Use appropriate equipment to lift or move patient in bed  -Instruct/ Assist with weight shifting every 2 hours when out of bed in chair  -Consider limitation of chair time 2 hour intervals    Skin Care:  -Avoid use of baby powder, tape, friction and shearing, hot water or constrictive clothing    Next Steps:  -Teach patient strategies to minimize risks such as incentive spirometer  11/24/2022 1055 by Shaila Gerard RN  Outcome: Progressing  11/24/2022 1055 by Shaila Gerard RN  Outcome: Progressing  Goal: Incision(s), wounds(s) or drain site(s) healing without S/S of infection  Description: INTERVENTIONS  - Assess and document dressing, incision, wound bed, drain sites and surrounding tissue  - Provide patient and family education  - Perform skin care/dressing changes every day  11/24/2022 1055 by Rachel Merrill RN  Outcome: Progressing  11/24/2022 1055 by Rachel Merrill, RN  Outcome: Progressing

## 2022-11-24 NOTE — PLAN OF CARE
Problem: PAIN - ADULT  Goal: Verbalizes/displays adequate comfort level or baseline comfort level  Description: Interventions:  - Encourage patient to monitor pain and request assistance  - Assess pain using appropriate pain scale  - Administer analgesics based on type and severity of pain and evaluate response  - Implement non-pharmacological measures as appropriate and evaluate response  - Consider cultural and social influences on pain and pain management  - Notify physician/advanced practitioner if interventions unsuccessful or patient reports new pain  Outcome: Progressing     Problem: INFECTION - ADULT  Goal: Absence or prevention of progression during hospitalization  Description: INTERVENTIONS:  - Assess and monitor for signs and symptoms of infection  - Monitor lab/diagnostic results  - Monitor all insertion sites, i e  indwelling lines, tubes, and drains  - Monitor endotracheal if appropriate and nasal secretions for changes in amount and color  - [place] appropriate cooling/warming therapies per order  - Administer medications as ordered  - Instruct and encourage patient and family to use good hand hygiene technique  - Identify and instruct in appropriate isolation precautions for identified infection/condition  Outcome: Progressing     Problem: SAFETY ADULT  Goal: Patient will remain free of falls  Description: INTERVENTIONS:  - Educate patient/family on patient safety including physical limitations  - Instruct patient to call for assistance with activity   - Consult OT/PT to assist with strengthening/mobility   - Keep Call bell within reach  - Keep bed low and locked with side rails adjusted as appropriate  - Keep care items and personal belongings within reach  - Initiate and maintain comfort rounds  - Make Fall Risk Sign visible to staff  - Offer Toileting every  Hours, in advance of need  - Initiate/Maintain alarm  - Obtain necessary fall risk management equipment  - Apply yellow socks and bracelet for high fall risk patients  - Consider moving patient to room near nurses station  Outcome: Progressing     Problem: DISCHARGE PLANNING  Goal: Discharge to home or other facility with appropriate resources  Description: INTERVENTIONS:  - Identify barriers to discharge w/patient and caregiver  - Arrange for needed discharge resources and transportation as appropriate  - Identify discharge learning needs (meds, wound care, etc )  - Arrange for interpretive services to assist at discharge as needed  - Refer to Case Management Department for coordinating discharge planning if the patient needs post-hospital services based on physician/advanced practitioner order or complex needs related to functional status, cognitive ability, or social support system  Outcome: Progressing     Problem: Knowledge Deficit  Goal: Patient/family/caregiver demonstrates understanding of disease process, treatment plan, medications, and discharge instructions  Description: Complete learning assessment and assess knowledge base    Interventions:  - Provide teaching at level of understanding  - Provide teaching via preferred learning methods  Outcome: Progressing     Problem: GENITOURINARY - ADULT  Goal: Maintains or returns to baseline urinary function  Description: INTERVENTIONS:  - Assess urinary function  - Encourage oral fluids to ensure adequate hydration if ordered  - Administer IV fluids as ordered to ensure adequate hydration  - Administer ordered medications as needed  - Offer frequent toileting  - Follow urinary retention protocol if ordered  Outcome: Progressing  Goal: Absence of urinary retention  Description: INTERVENTIONS:  - Assess patient’s ability to void and empty bladder  - Monitor I/O  - Bladder scan as needed  - Discuss with physician/AP medications to alleviate retention as needed  - Discuss catheterization for long term situations as appropriate  Outcome: Progressing     Problem: SKIN/TISSUE INTEGRITY - ADULT  Goal: Skin Integrity remains intact(Skin Breakdown Prevention)  Description: Assess:  -Perform Matthew assessment every shift  -Clean and moisturize skin every shift  -Inspect skin when repositioning, toileting, and assisting with ADLS  -Assess extremities for adequate circulation and sensation     Bed Management:  -Have minimal linens on bed & keep smooth, unwrinkled  -Change linens as needed when moist or perspiring  -Avoid sitting or lying in one position for more than 2 hours while in bed  -Keep HOB at 30 degrees     Toileting:  -Offer bedside commode    Activity:  -Mobilize patient 4 times a day  -Encourage activity and walks on unit   -Turn and reposition patient every 2 Hours  -Use appropriate equipment to lift or move patient in bed  -Instruct/ Assist with weight shifting every 2 hours when out of bed in chair  -Consider limitation of chair time 2 hour intervals    Skin Care:  -Avoid use of baby powder, tape, friction and shearing, hot water or constrictive clothing    Next Steps:  -Teach patient strategies to minimize risks such as incentive spirometer  Outcome: Progressing  Goal: Incision(s), wounds(s) or drain site(s) healing without S/S of infection  Description: INTERVENTIONS  - Assess and document dressing, incision, wound bed, drain sites and surrounding tissue  - Provide patient and family education  - Perform skin care/dressing changes every   Outcome: Progressing

## 2022-11-24 NOTE — PHYSICAL THERAPY NOTE
PHYSICAL THERAPY EVALUATION          Patient Name: Lizzy Patterson  JYRHG'U Date: 11/24/2022  PT EVALUATION    65 y o     [id number]    Perforated rectum ([facility] [address] ) [K63 1]  Colostomy present ([facility] ) [Z93 3]    Past Medical History:   Diagnosis Date    Asthma     Bronchitis     COPD (chronic obstructive pulmonary disease) ([facility] )     COVID 10/2021    Hepatitis C     Infectious viral hepatitis     Liver disease     Pneumonia     Tinnitus          Past Surgical History:   Procedure Laterality Date    COLOSTOMY N/A 09/26/2022    Procedure: COLOSTOMY END;  Surgeon: Festus Smith MD;  [facility];  Service: General    COLOSTOMY N/A 09/26/2022    Procedure: COLOSTOMY LOOP;  Surgeon: Festus Smith MD;  [facility];  Service: General    KNEE ARTHROSCOPY Left     LAPAROTOMY N/A 09/26/2022    Procedure: LAPAROTOMY EXPLORATORY, LYSIS OF ADHESIONS, LOW ANTERIOR RESECTION WITH PRIMARY ANASTOMOSIS;  Surgeon: Festus Smith MD;  [facility];  Service: General    MOUTH SURGERY      CA PART REMOVAL COLON W ANASTOMOSIS N/A 09/26/2022    Procedure: RESECTION COLON SIGMOID;  Surgeon: Festus Smith MD;  [facility];  Service: General    CA PROCTOSIGMOIDOSCOPY,RIGID,DIAGNOS N/A 09/26/2022    Procedure: Zohra Manzo RIGID;  Surgeon: Festus Smith MD;  [facility];  Service: General        11/24/22 0816   PT Last Visit   PT Visit Date 11/24/22   Note Type   Note type Evaluation   Pain Assessment   Pain Assessment Tool 0-10   Pain Score 5   Pain Location/Orientation Location: Abdomen   Hospital Pain Intervention(s) Repositioned; Ambulation/increased activity; Emotional support   Restrictions/Precautions   Other Precautions Multiple lines;Pain   Home Living   Type of Home Other (Comment)  (recovery house)   Home Layout Multi-level;Bed/bath upstairs   Bathroom Shower/Tub Tub/shower unit   Bathroom Toilet Standard   Additional Comments 0 LEORA  FOS to bedroom  bathroom on both levels   Prior Function   Level of [place] Independent with ADLs; Independent with functional mobility; Independent with IADLS   Lives With Other (Comment)  (several roommates)   IADLs Independent with meal prep   Falls in the last 6 months 0   Vocational Unemployed   Comments indep at baseline without an AD  walks as means of transportation   General   Additional Pertinent History pt admitted 11/23/22 for colostomy present, POD#1 CLOSURE COLOSTOMY, LYSIS OF ADEHSIONS, RIGID SIGMOIDOSCOPY  PMHx significant for asthma, COPD, covid, Hep C   Cognition   Overall Cognitive Status WFL   Arousal/Participation Cooperative   Orientation Level Oriented X4   Memory Within functional limits   Following Commands Follows all commands and directions without difficulty   RLE Assessment   RLE Assessment WFL   LLE Assessment   LLE Assessment WFL   Coordination   Sensation WFL   Bed Mobility   Supine to Sit 7  Independent   Sit to Supine 7  Independent   Transfers   Sit to Stand 7  Independent   Stand to Sit 7  Independent   Ambulation/Elevation   Gait pattern WNL; Excessively slow   Gait Assistance 7  Independent   Additional items Assist x 1   Assistive Device None   Distance >300'   Stair Management Assistance 7  Independent   Stair Management Technique No rails; Alternating pattern; Foreward   Number of Stairs 5   Ambulation/Elevation Additional Comments  steps   Balance   Static Standing Good   Dynamic Standing Fair +   Ambulatory Fair +   Endurance Deficit   Endurance Deficit No   Activity Tolerance   Activity Tolerance Patient tolerated treatment well   Medical Staff Made Aware Aiyana OT   Nurse Made Aware Karen JORGENSEN   Assessment   Assessment Jewel Marshall is a 65 y o  male admitted to [email or web address] on 11/23/2022 for Colostomy present ([facility] [address] [facility] )  POD#1 CLOSURE COLOSTOMY, LYSIS OF ADEHSIONS, RIGID SIGMOIDOSCOPY  PT was consulted and pt was seen on 11/24/2022 for mobility assessment and d/c planning   Pt presents w low fall risk, multiple lines  At baseline is indep without an AD  Pt is currently functioning at a independent level for bed mobility, transfers, ambulation and stair negotiation  Pt demonstrated no deficits of BLE strength, balance or endurance  Currently ambulating community distances and negotiating steps to simulate home environment  Does not demonstrate need for further skilled IPPT services  At this time PT recommendations for d/c are home when medically stable     Plan   PT Frequency   (d/c PT)   Recommendation   PT Discharge Recommendation No rehabilitation needs   AM-PAC Basic Mobility Inpatient   Turning in Bed Without Bedrails 4   Lying on Back to Sitting on Edge of Flat Bed 4   Moving Bed to Chair 4   Standing Up From Chair 4   Walk in Room 4   Climb 3-5 Stairs 4   Basic Mobility Inpatient Raw Score 24   Basic Mobility Standardized Score 57 68   Highest Level Of Mobility   JH-HLM Goal 8: Walk 250 feet or more   JH-HLM Achieved 8: Walk 250 feet ot more   History: co - morbidities, multiple lines  Exam: impairments in systems including musculoskeletal (strength), neuromuscular (balance, gait, transfers, motor function and sensation), am-pac, cognition  Clinical: stable/unpredictable  Complexity: moderate      Kiran Ibrahim, PT

## 2022-11-25 ENCOUNTER — TELEPHONE (OUTPATIENT)
Dept: SURGERY | Facility: CLINIC | Age: 57
End: 2022-11-25

## 2022-11-25 VITALS
HEART RATE: 71 BPM | RESPIRATION RATE: 20 BRPM | HEIGHT: 71 IN | BODY MASS INDEX: 23.86 KG/M2 | OXYGEN SATURATION: 92 % | TEMPERATURE: 99.7 F | DIASTOLIC BLOOD PRESSURE: 70 MMHG | WEIGHT: 170.42 LBS | SYSTOLIC BLOOD PRESSURE: 123 MMHG

## 2022-11-25 PROBLEM — Z93.3 COLOSTOMY PRESENT (HCC): Status: RESOLVED | Noted: 2022-10-31 | Resolved: 2022-11-25

## 2022-11-25 RX ADMIN — METHOCARBAMOL 500 MG: 500 TABLET ORAL at 05:27

## 2022-11-25 RX ADMIN — HEPARIN SODIUM 5000 UNITS: 5000 INJECTION INTRAVENOUS; SUBCUTANEOUS at 05:27

## 2022-11-25 RX ADMIN — SODIUM CHLORIDE, SODIUM LACTATE, POTASSIUM CHLORIDE, AND CALCIUM CHLORIDE 75 ML/HR: 600; 310; 30; 20 INJECTION, SOLUTION INTRAVENOUS at 05:31

## 2022-11-25 RX ADMIN — KETOROLAC TROMETHAMINE 15 MG: 30 INJECTION, SOLUTION INTRAMUSCULAR; INTRAVENOUS at 08:21

## 2022-11-25 RX ADMIN — GABAPENTIN 100 MG: 100 CAPSULE ORAL at 08:21

## 2022-11-25 NOTE — PLAN OF CARE
Problem: PAIN - ADULT  Goal: Verbalizes/displays adequate comfort level or baseline comfort level  Description: Interventions:  - Encourage patient to monitor pain and request assistance  - Assess pain using appropriate pain scale  - Administer analgesics based on type and severity of pain and evaluate response  - Implement non-pharmacological measures as appropriate and evaluate response  - Consider cultural and social influences on pain and pain management  - Notify physician/advanced practitioner if interventions unsuccessful or patient reports new pain  Outcome: Progressing     Problem: INFECTION - ADULT  Goal: Absence or prevention of progression during hospitalization  Description: INTERVENTIONS:  - Assess and monitor for signs and symptoms of infection  - Monitor lab/diagnostic results  - Monitor all insertion sites, i e  indwelling lines, tubes, and drains  - Monitor endotracheal if appropriate and nasal secretions for changes in amount and color  - [place] appropriate cooling/warming therapies per order  - Administer medications as ordered  - Instruct and encourage patient and family to use good hand hygiene technique  - Identify and instruct in appropriate isolation precautions for identified infection/condition  Outcome: Progressing     Problem: SAFETY ADULT  Goal: Patient will remain free of falls  Description: INTERVENTIONS:  - Educate patient/family on patient safety including physical limitations  - Instruct patient to call for assistance with activity   - Consult OT/PT to assist with strengthening/mobility   - Keep Call bell within reach  - Keep bed low and locked with side rails adjusted as appropriate  - Keep care items and personal belongings within reach  - Initiate and maintain comfort rounds  - Make Fall Risk Sign visible to staff  - Apply yellow socks and bracelet for high fall risk patients  - Consider moving patient to room near nurses station  Outcome: Progressing     Problem: DISCHARGE PLANNING  Goal: Discharge to home or other facility with appropriate resources  Description: INTERVENTIONS:  - Identify barriers to discharge w/patient and caregiver  - Arrange for needed discharge resources and transportation as appropriate  - Identify discharge learning needs (meds, wound care, etc )  - Arrange for interpretive services to assist at discharge as needed  - Refer to Case Management Department for coordinating discharge planning if the patient needs post-hospital services based on physician/advanced practitioner order or complex needs related to functional status, cognitive ability, or social support system  Outcome: Progressing     Problem: Knowledge Deficit  Goal: Patient/family/caregiver demonstrates understanding of disease process, treatment plan, medications, and discharge instructions  Description: Complete learning assessment and assess knowledge base    Interventions:  - Provide teaching at level of understanding  - Provide teaching via preferred learning methods  Outcome: Progressing     Problem: GENITOURINARY - ADULT  Goal: Maintains or returns to baseline urinary function  Description: INTERVENTIONS:  - Assess urinary function  - Encourage oral fluids to ensure adequate hydration if ordered  - Administer IV fluids as ordered to ensure adequate hydration  - Administer ordered medications as needed  - Offer frequent toileting  - Follow urinary retention protocol if ordered  Outcome: Progressing  Goal: Absence of urinary retention  Description: INTERVENTIONS:  - Assess patient’s ability to void and empty bladder  - Monitor I/O  - Bladder scan as needed  - Discuss with physician/AP medications to alleviate retention as needed  - Discuss catheterization for long term situations as appropriate  Outcome: Progressing     Problem: SKIN/TISSUE INTEGRITY - ADULT  Goal: Skin Integrity remains intact(Skin Breakdown Prevention)  Description: Assess:  -Perform Matthew assessment every shift  -Clean and moisturize skin every shift  -Inspect skin when repositioning, toileting, and assisting with ADLS  -Assess extremities for adequate circulation and sensation     Bed Management:  -Have minimal linens on bed & keep smooth, unwrinkled  -Change linens as needed when moist or perspiring  -Avoid sitting or lying in one position for more than 2 hours while in bed  -Keep HOB at 30 degrees     Toileting:  -Offer bedside commode    Activity:  -Mobilize patient 4 times a day  -Encourage activity and walks on unit   -Turn and reposition patient every 2 Hours  -Use appropriate equipment to lift or move patient in bed  -Instruct/ Assist with weight shifting every 2 hours when out of bed in chair  -Consider limitation of chair time 2 hour intervals    Skin Care:  -Avoid use of baby powder, tape, friction and shearing, hot water or constrictive clothing    Next Steps:  -Teach patient strategies to minimize risks such as incentive spirometer  Outcome: Progressing  Goal: Incision(s), wounds(s) or drain site(s) healing without S/S of infection  Description: INTERVENTIONS  - Assess and document dressing, incision, wound bed, drain sites and surrounding tissue  - Provide patient and family education  - Perform skin care/dressing changes every day  Outcome: Progressing     Problem: Potential for Falls  Goal: Patient will remain free of falls  Description: INTERVENTIONS:  - Educate patient/family on patient safety including physical limitations  - Instruct patient to call for assistance with activity   - Consult OT/PT to assist with strengthening/mobility   - Keep Call bell within reach  - Keep bed low and locked with side rails adjusted as appropriate  - Keep care items and personal belongings within reach  - Initiate and maintain comfort rounds  - Make Fall Risk Sign visible to staff  - Apply yellow socks and bracelet for high fall risk patients  - Consider moving patient to room near nurses station  Outcome: Progressing

## 2022-11-25 NOTE — PROGRESS NOTES
Progress Note - General Surgery   Armen Rehman 65 y o  male MRN: [id number]  Unit/Bed#: E5 -01 Encounter: [id number]    Assessment:  65 y o  male 2 Days Post-Op s/p Procedure(s) (LRB):  CLOSURE COLOSTOMY, LYSIS OF ADEHSIONS, RIGID SIGMOIDOSCOPY (N/A)    Passing flatus, tolerated clears    Plan:  Advanced Cruz diet  Decreased fluids, if tolerating a low residual diet will stop IV fluids    Dayana removed bedside  Wound care to prior colostomy site with dry dressing on the outside    DVT prophylaxis    Of bed with ambulation  Pain control as needed    Anticipate discharge tomorrow versus Sunday    Subjective/Objective   Chief Complaint:     Subjective:  No acute events overnight  The patient is resting comfortably in his bed at this time  He is tolerating a clear liquid diet without any issues  He is passing flatus  Objective:     Blood pressure 123/70, pulse 71, temperature 99 7 °F (37 6 °C), resp  rate 20, height 5' 11" (1 803 m), weight 77 3 kg (170 lb 6 7 oz), SpO2 92 %  ,Body mass index is 23 77 kg/m²  No intake or output data in the 24 hours ending 11/25/22 0733    Invasive Devices     Peripheral Intravenous Line  Duration           Peripheral IV 11/23/22 Right Hand 1 day          Drain  Duration           Colostomy Loop LLQ 60 days                Physical Exam: General: AAOx3  Head: normocephalic, atraumatic  Neck: supple, trachea midline  Respiratory: BS b/l  Abdomen: Soft, NT, no rebound/guarding  Incision c/d/i in LLQ, dayana removed  Heart: RRR, S1s2  Ext: Warm no cyanosis         Lab, Imaging and other studies:I have personally reviewed pertinent lab results    , CBC: No results found for: WBC, HGB, HCT, MCV, PLT, ADJUSTEDWBC, MCH, MCHC, RDW, MPV, NRBC, CMP: No results found for: SODIUM, K, CL, CO2, ANIONGAP, BUN, CREATININE, GLUCOSE, CALCIUM, AST, ALT, ALKPHOS, PROT, BILITOT, EGFR  VTE Pharmacologic Prophylaxis: Heparin  VTE Mechanical Prophylaxis: sequential compression device

## 2022-11-25 NOTE — TELEPHONE ENCOUNTER
S/P = COLOSTOMY CLOSURE = 11/23/2022    Unable to reach patient, he is still admitted  No path pending  Scheduled.  Becca Schwartz MA

## 2022-11-25 NOTE — DISCHARGE INSTRUCTIONS
[facility] Instructions  Dr Shashank East MD, FACS    1  General: You will feel pulling sensations around the wound or funny aches and pains around the incisions  This is normal  Even minor surgery is a change in your body and this is your body’s way of reacting to it  If you have had abdominal surgery, it may help to support the incision with a small pillow or blanket for comfort when moving or coughing  2  Wound care: Make sure to remove the bandage in about 24 hours, unless instructed otherwise  You usually don't have to redress the wound after 24-48 hours, unless for comfort  Keep the incision clean and dry  Let it air out  If the Steri-Strips fall off, just keep the wound clean  3  Water: You may shower over the wound, unless there are drain tubes left in place  You may shower right over the staples or Steri-Strips, let soap and water run over the wounds but do not scrub, and pat dry when you are done  Do not bathe or use a pool or hot tub until cleared by the physician  4  Activity: You may go up and down stairs, walk as much as you are comfortable, but walk at least 3 times each day  If you have had abdominal surgery, do not lift anything heavier than 15 pounds for at least 2-4 weeks, unless cleared by the doctor  5  Diet: You may resume a regular diet  If you had a same-day surgery or overnight stay surgery, you may wish to eat lightly for a few days: soups, crackers, and sandwiches  You may resume a regular diet when ready  6  Medications: Resume all of your previous medications, unless told otherwise by the doctor  Avoid aspirin or ibuprofen (Advil, Motrin, etc ) products for 2-3 days after the date of surgery  You may, at that time, began to take them again  Tylenol is always fine, unless you are taking any narcotic pain medication containing Tylenol (such as Percocet, Darvocet, Vicodin, or anything containing acetaminophen)   Do not take Tylenol if you're taking these medications  You do not need to take the narcotic pain medications unless you are having significant pain and discomfort  7  Driving: You will need someone to drive you home on the day of surgery  Do not drive or make any important decisions while on narcotic pain medication or 24 hours and after anesthesia or sedation for surgery  Generally, you may drive when you're off all narcotic pain medications  8  Upset Stomach: You may take Maalox, Tums, or similar items for an upset stomach  If your narcotic pain medication causes an upset stomach, do not take it on an empty stomach  Try taking it with at least some crackers or toast      9  Constipation: Patients often experience constipation after surgery  You may take over-the-counter medication for this, such as Metamucil, Senokot, Dulcolax, milk of magnesia, etc  You may take a suppository unless you have had anorectal surgery such as a procedure on your hemorrhoids  If you experience significant nausea or vomiting after abdominal surgery, call the office before trying any of these medications  10  Call the office: If you are experiencing any of the following: fevers above 101 5°, significant nausea or vomiting, if the wound develops drainage and/or excessive or spreading redness around the wound, or if you have significant diarrhea or other worsening symptoms  11  Pain: You may be given a prescription for pain  This will be given to you at the hospital, the day of surgery  12  Sexual Activity: You may resume sexual activity when you feel ready and comfortable and your incision is sealed and healed without apparent infection risk  13  Urination: If you haven't urinated in 6 hours, go directly to the ER for evaluation for urinary retention       [address]  [place], [address]   Phone: [phone]

## 2022-11-25 NOTE — UTILIZATION REVIEW
Initial Clinical Review    Elective IP surgical procedure  Age/Sex: 65 y o  male  Surgery Date: 11/23/22  Procedure:   Preop Diagnosis:  Perforated rectum ([facility] ) [K63 1]  Colostomy present ([address] ) [Z93 3]     Post-Op Diagnosis Codes:     * Perforated rectum ([address] ) [K63 1]     * Colostomy present ([address] ) [Z93 3]     Procedure(s) (LRB):  CLOSURE COLOSTOMY, LYSIS OF ADEHSIONS, RIGID SIGMOIDOSCOPY (N/A)     Anesthesia: General       Operative Findings:  Adhesions at loop colostomy site taken down sharply  Good bleeding colon with healthy ends - adequate for anastomosis  Rigid proctoscopy performed and anastomosis visualized, stressed with bubble test and negative    POD#1 Progress Note:   Diet clears, no carbonation, PRN analgesia, antiemetics, keep chel until 11/26  Did not want to take analgesia and was in pain  Discussed need for nonopioid analgesia and pt agreeable  No flatus, BM, N/V   BP a bit low  Afebrile  Date: 11/25  POD #2 - d/c to home, pain improved        Admission Orders: Date/Time/Statement:   Admission Orders (From admission, onward)     Ordered        11/23/22 1205  Inpatient Admission  Once                      Orders Placed This Encounter   Procedures   • Inpatient Admission     Standing Status:   Standing     Number of Occurrences:   1     Order Specific Question:   Level of Care     Answer:   Med Surg [16]     Order Specific Question:   Estimated length of stay     Answer:   Inpatient Only Surgery     Vital Signs: /70   Pulse 71   Temp 99 7 °F (37 6 °C)   Resp 20   Ht 5' 11" (1 803 m)   Wt 77 3 kg (170 lb 6 7 oz)   SpO2 92%   BMI 23 77 kg/m²     Pertinent Labs/Diagnostic Test Results:   No orders to display     Results from last 7 days   Lab Units 11/23/22  0726   SARS-COV-2  Negative     Results from last 7 days   Lab Units 11/24/22  0452 11/23/22  1231   WBC Thousand/uL 8 16 7 87   HEMOGLOBIN g/dL 14 0 16 0   HEMATOCRIT % 38 8 47 7   PLATELETS Thousands/uL 183 147*   NEUTROS ABS Thousands/µL 5 26  --          Results from last 7 days   Lab Units 11/24/22  0452 11/23/22  1231   SODIUM mmol/L 141 140   POTASSIUM mmol/L 3 9 4 2   CHLORIDE mmol/L 106 103   CO2 mmol/L 26 30   ANION GAP mmol/L 9 7   BUN mg/dL 8 8   CREATININE mg/dL 0 80 0 83   EGFR ml/min/1 73sq m 99 98   CALCIUM mg/dL 8 8 8 8             Results from last 7 days   Lab Units 11/24/22  0452 11/23/22  1231   GLUCOSE RANDOM mg/dL 120 88           Results from last 7 days   Lab Units 11/23/22  0726   INFLUENZA A PCR  Negative   INFLUENZA B PCR  Negative   RSV PCR  Negative     Diet: GI Lo residue/fiber, no carbonation  Mobility: OOB   DVT Prophylaxis: SCDs, Lovenox sq, heparin SQ    Medications/Pain Control:   Scheduled Medications:  Tylenol  Lovenox sq daily  Neurontin TID  Heparin sq  Robaxin   IV Flagyl q 8 hr     Continuous IV Infusions:    IV LR d/c 11/25 @ 1415    PRN Meds:    IV Toradol x 2 11/24, x 1 11/25  Oxycodone 10 mg x 1 11/24    Network Utilization Review Department  ATTENTION: Please call with any questions or concerns to [phone] and carefully listen to the prompts so that you are directed to the right person  All voicemails are confidential   Marcelo Gaviria all requests for admission clinical reviews, approved or denied determinations and any other requests to dedicated fax number below belonging to the campus where the patient is receiving treatment   List of dedicated fax numbers for the Facilities:  [address] [place] [address] DENIALS (Administrative/Medical Necessity) [phone]   [address] [place] [address] (Maternity/NICU/Pediatrics) [phone]   [address] [phone]   [address] [address] [address] [place] [place] [address] [place] [address] Rd [phone] [address] [address] [phone]   [address] [phone]

## 2022-11-25 NOTE — PLAN OF CARE
Problem: SAFETY ADULT  Goal: Patient will remain free of falls  Description: INTERVENTIONS:  - Educate patient/family on patient safety including physical limitations  - Instruct patient to call for assistance with activity   - Consult OT/PT to assist with strengthening/mobility   - Keep Call bell within reach  - Keep bed low and locked with side rails adjusted as appropriate  - Keep care items and personal belongings within reach  - Initiate and maintain comfort rounds  - Make Fall Risk Sign visible to staff  - Apply yellow socks and bracelet for high fall risk patients  - Consider moving patient to room near nurses station  Outcome: Progressing     Problem: Potential for Falls  Goal: Patient will remain free of falls  Description: INTERVENTIONS:  - Educate patient/family on patient safety including physical limitations  - Instruct patient to call for assistance with activity   - Consult OT/PT to assist with strengthening/mobility   - Keep Call bell within reach  - Keep bed low and locked with side rails adjusted as appropriate  - Keep care items and personal belongings within reach  - Initiate and maintain comfort rounds  - Make Fall Risk Sign visible to staff  - Apply yellow socks and bracelet for high fall risk patients  - Consider moving patient to room near nurses station  Outcome: Progressing

## 2022-11-28 NOTE — TELEPHONE ENCOUNTER
11/23/22 = S/P Ostomy Reversal    Discharged: 11/25/22    Called and spoke to patient  He reports no F/V/N/C  He states that he feels okay - pretty sore  Appetite is returning, bowel movements are become more normal (he's getting used to them)  He'll call office if he has any questions/issues  He's aware that he'll be called when pathology has been finalized and verified by provider  Confirmed upcoming POPV  Path pending

## 2022-11-29 NOTE — UTILIZATION REVIEW
Initial Clinical Review    Elective IP surgical procedure  Age/Sex: 65 y o  male  Surgery Date: 11/23/22  Procedure:   Preop Diagnosis:  Perforated rectum ([facility] [address] ) [K63 1]  Colostomy present ([address] ) [Z93 3]     Post-Op Diagnosis Codes:     * Perforated rectum ([address] ) [K63 1]     * Colostomy present ([address] ) [Z93 3]     Procedure(s) (LRB):  CLOSURE COLOSTOMY, LYSIS OF ADEHSIONS, RIGID SIGMOIDOSCOPY (N/A)     Anesthesia: General       Operative Findings:  Adhesions at loop colostomy site taken down sharply  Good bleeding colon with healthy ends - adequate for anastomosis  Rigid proctoscopy performed and anastomosis visualized, stressed with bubble test and negative    POD#1 Progress Note:   Diet clears, no carbonation, PRN analgesia, antiemetics, keep chel until 11/26  Did not want to take analgesia and was in pain  Discussed need for nonopioid analgesia and pt agreeable  No flatus, BM, N/V   BP a bit low  Afebrile  Date: 11/25  POD #2 - d/c to home, pain improved        Admission Orders: Date/Time/Statement:   Admission Orders (From admission, onward)     Ordered        11/23/22 1205  Inpatient Admission  Once                      Orders Placed This Encounter   Procedures   • Inpatient Admission     Standing Status:   Standing     Number of Occurrences:   1     Order Specific Question:   Level of Care     Answer:   Med Surg [16]     Order Specific Question:   Estimated length of stay     Answer:   Inpatient Only Surgery     Vital Signs: /70   Pulse 71   Temp 99 7 °F (37 6 °C)   Resp 20   Ht 5' 11" (1 803 m)   Wt 77 3 kg (170 lb 6 7 oz)   SpO2 92%   BMI 23 77 kg/m²     Pertinent Labs/Diagnostic Test Results:   No orders to display     Results from last 7 days   Lab Units 11/23/22  0726   SARS-COV-2  Negative     Results from last 7 days   Lab Units 11/24/22  0452 11/23/22  1231   WBC Thousand/uL 8 16 7 87   HEMOGLOBIN g/dL 14 0 16 0   HEMATOCRIT % 38 8 47 7   PLATELETS Thousands/uL 183 147*   NEUTROS ABS Thousands/µL 5 26  --          Results from last 7 days   Lab Units 11/24/22  0452 11/23/22  1231   SODIUM mmol/L 141 140   POTASSIUM mmol/L 3 9 4 2   CHLORIDE mmol/L 106 103   CO2 mmol/L 26 30   ANION GAP mmol/L 9 7   BUN mg/dL 8 8   CREATININE mg/dL 0 80 0 83   EGFR ml/min/1 73sq m 99 98   CALCIUM mg/dL 8 8 8 8             Results from last 7 days   Lab Units 11/24/22  0452 11/23/22  1231   GLUCOSE RANDOM mg/dL 120 88           Results from last 7 days   Lab Units 11/23/22  0726   INFLUENZA A PCR  Negative   INFLUENZA B PCR  Negative   RSV PCR  Negative     Diet: GI Lo residue/fiber, no carbonation  Mobility: OOB   DVT Prophylaxis: SCDs, Lovenox sq, heparin SQ    Medications/Pain Control:   Scheduled Medications:  Tylenol  Lovenox sq daily  Neurontin TID  Heparin sq  Robaxin   IV Flagyl q 8 hr     Continuous IV Infusions:    IV LR d/c 11/25 @ 1415    PRN Meds:    IV Toradol x 2 11/24, x 1 11/25  Oxycodone 10 mg x 1 11/24  NOTIFICATION OF ADMISSION DISCHARGE   This is a Notification of Discharge from [facility]  Please be advised that this patient has been discharge from our facility  Below you will find the admission and discharge date and time including the patient’s disposition  UTILIZATION REVIEW CONTACT:  Vidhi Morgan  Utilization   Network Utilization Review Department  Phone: [phone] x carefully listen to the prompts  All voicemails are confidential   Email: [email or web address]     ADMISSION INFORMATION  PRESENTATION DATE: 11/23/2022  6:56 AM    INPATIENT ADMISSION DATE: 11/23/22 12:05 PM   DISCHARGE DATE: 11/25/2022 12:15 PM   DISPOSITION:Home/Self Care    IMPORTANT INFORMATION:  Send all requests for admission clinical reviews, approved or denied determinations and any other requests to dedicated fax number below belonging to the campus where the patient is receiving treatment   List of dedicated fax numbers:  Mitzi Mitchell NUMBER   ADMISSION DENIALS (Administrative/Medical Necessity) [address] [facility] (Maternity/NICU/Pediatrics) [phone]   [facility] [address] [phone]   [address] [phone]   [address] [facility] [phone]   [address] [facility] [place] [address] [phone]   [facility]  [phone]   [address] [facility] [phone]   [address] [facility] [address] [phone]

## 2022-12-09 NOTE — PROGRESS NOTES
Assessment/Plan:   Sanjuanita Kat is a 65 y o male who comes in today for postoperative check after     Reversal of a loop colostomy on 11/23/2022  He did quite well from surgery and made a great recovery  Colonoscopy showed this polyp in the colon just proximal to the anastomosis  I would recommend a 1 year follow-up colonoscopy  Pathology:  Reviewed with patient, all questions answered  Final Diagnosis   A  Polyp, Colorectal, polyp at 15cm:  - Tubular adenoma, fragments  - Negative for high grade dysplasia/ carcinoma  Postoperative restrictions reviewed  All questions answered  Patient does have erythema around old abdominal surgical site for his original ileostomy surgery as well as his ileostomy reversal surgery from 11/23/22  He states no fevers, or chills, nausea, or vomiting  Prescribed Augmentin for cellulitis prevention  Patient did just start [address] for Hep c treatment      ______________________________________________________  HPI:  Sanjuanita Kat is a 65 y o male who comes in today for postoperative check after recent Reversal of a loop colostomy on 11/23/2022 with Dr Leonides Lynn  Currently doing well without problems, no fever or chills,no nausea and no vomiting  Reports some itching on his abdomen after starting [address] for his Hepatitis C     ROS:  General ROS: negative for - chills, fatigue, fever or night sweats, weight loss  Respiratory ROS: no cough, shortness of breath, or wheezing  Cardiovascular ROS: no chest pain or dyspnea on exertion  Genito-Urinary ROS: no dysuria, trouble voiding, or hematuria  Musculoskeletal ROS: negative for - gait disturbance, joint pain or muscle pain  Neurological ROS: no TIA or stroke symptoms  GI ROS: see HPI  Skin ROS: no new rashes or lesions   Lymphatic ROS: no new adenopathy noted by pt     GYN ROS: see HPI, no new GYN history or bleeding noted  Psy ROS: no new mental or behavioral disturbances         Patient Active Problem List   Diagnosis   • Asthma   • Hepatitis C   • COPD (chronic obstructive pulmonary disease) ([facility])       Allergies:  Bactrim [sulfamethoxazole-trimethoprim], Ciprofloxacin, Dust mite extract, and Sulfa antibiotics      Current Outpatient Medications:   •  amoxicillin-clavulanate (AUGMENTIN) 500-125 mg per tablet, Take 1 tablet by mouth every 12 (twelve) hours for 7 days, Disp: 14 tablet, Rfl: 0  •  Mavyret 100-40 MG TABS, 3 pills daily, Disp: , Rfl:     Past Medical History:   Diagnosis Date   • Asthma    • Bronchitis    • COPD (chronic obstructive pulmonary disease) ([facility] [address] )    • COVID 10/2021   • Hepatitis C    • Infectious viral hepatitis    • Liver disease    • Pneumonia    • Tinnitus        Past Surgical History:   Procedure Laterality Date   • COLOSTOMY N/A 09/26/2022    Procedure: COLOSTOMY END;  Surgeon: Cari Sanchez MD;  [facility];  Service: General   • COLOSTOMY N/A 09/26/2022    Procedure: COLOSTOMY LOOP;  Surgeon: Cari Sanchez MD;  [facility];  Service: General   • KNEE ARTHROSCOPY Left    • LAPAROTOMY N/A 09/26/2022    Procedure: LAPAROTOMY EXPLORATORY, LYSIS OF ADHESIONS, LOW ANTERIOR RESECTION WITH PRIMARY ANASTOMOSIS;  Surgeon: Cari Sanchez MD;  [facility];  Service: General   • MOUTH SURGERY     • IA CLOSE ENTEROSTOMY N/A 11/23/2022    Procedure: CLOSURE COLOSTOMY, LYSIS OF ADEHSIONS, RIGID SIGMOIDOSCOPY;  Surgeon: Cari Sanchez MD;  [facility];  Service: General   • IA PART REMOVAL COLON W ANASTOMOSIS N/A 09/26/2022    Procedure: RESECTION COLON SIGMOID;  Surgeon: Cari Sanchez MD;  [facility];  Service: General   • IA PROCTOSIGMOIDOSCOPY,RIGID,DIAGNOS N/A 09/26/2022    Procedure: Joanna Medin RIGID;  Surgeon: Cari Sanchez MD;  [facility];  Service: General       History reviewed  No pertinent family history  reports that he has been smoking cigarettes  He has been smoking an average of  25 packs per day   He has never used smokeless tobacco  He reports that he does not currently use alcohol  He reports that he does not currently use drugs after having used the following drugs: Marijuana, Heroin, Methamphetamines, and Fentanyl  Vitals:    12/15/22 1319   BP: 120/80   Pulse: 87   Temp: (!) 96 8 °F (36 °C)       PHYSICAL EXAM  General: normal, cooperative, no distress  Abdominal: soft, nondistended or nontender  Incision: clean, dry, and intact and healing well; patient does have erythema around old abdominal surgical site for his original ileostomy surgery as well as his ileostomy reversal surgery from 11/23/22  Patient does have a slight rash on his upper abdomen       Aurther Friday PA-MCKENZIE  Date: 12/15/2022 Time: 1:51 PM

## 2022-12-15 ENCOUNTER — OFFICE VISIT (OUTPATIENT)
Dept: SURGERY | Facility: CLINIC | Age: 57
End: 2022-12-15

## 2022-12-15 VITALS
DIASTOLIC BLOOD PRESSURE: 80 MMHG | BODY MASS INDEX: 24.81 KG/M2 | HEIGHT: 71 IN | HEART RATE: 87 BPM | SYSTOLIC BLOOD PRESSURE: 120 MMHG | WEIGHT: 177.2 LBS | TEMPERATURE: 96.8 F

## 2022-12-15 DIAGNOSIS — Z09 POSTOP CHECK: ICD-10-CM

## 2022-12-15 DIAGNOSIS — L03.311 CELLULITIS OF ABDOMINAL WALL: Primary | ICD-10-CM

## 2022-12-15 RX ORDER — GLECAPREVIR AND PIBRENTASVIR 40; 100 MG/1; MG/1
TABLET, FILM COATED ORAL
COMMUNITY
Start: 2022-12-13

## 2022-12-15 RX ORDER — AMOXICILLIN AND CLAVULANATE POTASSIUM 250; 125 MG/1; MG/1
1 TABLET, FILM COATED ORAL EVERY 8 HOURS SCHEDULED
Qty: 21 TABLET | Refills: 0 | Status: SHIPPED | OUTPATIENT
Start: 2022-12-15 | End: 2022-12-15

## 2022-12-15 RX ORDER — AMOXICILLIN AND CLAVULANATE POTASSIUM 500; 125 MG/1; MG/1
1 TABLET, FILM COATED ORAL EVERY 12 HOURS SCHEDULED
Qty: 14 TABLET | Refills: 0 | Status: SHIPPED | OUTPATIENT
Start: 2022-12-15 | End: 2022-12-19 | Stop reason: SDUPTHER

## 2022-12-19 ENCOUNTER — OFFICE VISIT (OUTPATIENT)
Dept: SURGERY | Facility: CLINIC | Age: 57
End: 2022-12-19

## 2022-12-19 VITALS
BODY MASS INDEX: 24.64 KG/M2 | DIASTOLIC BLOOD PRESSURE: 70 MMHG | SYSTOLIC BLOOD PRESSURE: 130 MMHG | WEIGHT: 176 LBS | HEIGHT: 71 IN | HEART RATE: 74 BPM | TEMPERATURE: 95 F

## 2022-12-19 DIAGNOSIS — L03.311 CELLULITIS OF ABDOMINAL WALL: Primary | ICD-10-CM

## 2022-12-19 RX ORDER — AMOXICILLIN AND CLAVULANATE POTASSIUM 500; 125 MG/1; MG/1
1 TABLET, FILM COATED ORAL EVERY 12 HOURS SCHEDULED
Qty: 14 TABLET | Refills: 0 | Status: SHIPPED | OUTPATIENT
Start: 2022-12-19 | End: 2022-12-26

## 2022-12-19 NOTE — PROGRESS NOTES
Assessment/Plan:   Melodie Mena is a 65 y o male who comes in today for postoperative check after     Reversal of a loop colostomy on 11/23/2022  He did quite well from surgery and made a great recovery  Colonoscopy showed this polyp in the colon just proximal to the anastomosis  I would recommend a 1 year follow-up colonoscopy  Pathology:  Reviewed with patient, all questions answered  Final Diagnosis   A  Polyp, Colorectal, polyp at 15cm:  - Tubular adenoma, fragments  - Negative for high grade dysplasia/ carcinoma  Postoperative restrictions reviewed  All questions answered  Patient does have erythema around old abdominal surgical site for his original ileostomy surgery as well as his ileostomy reversal surgery from 11/23/22  He states no fevers, or chills, nausea, or vomiting  Prescribed Augmentin for cellulitis prevention  Patient did just start [address] for Hep c treatment  Patient did not get his antibiotics started as he states it was sent to the wrong pharmacy but never called  We will send it to his updated pharmacy today and he knows to start treatment ASAP  Patient is acutely aware this can turn into an abscess if antibiotics are not started right away and to call if needs to be seen earlier than Thursday     ______________________________________________________  HPI:  Melodie Mena is a 65 y o male who comes in today for postoperative check after recent Reversal of a loop colostomy on 11/23/2022 with Dr More Jose  Currently doing well without problems, no fever or chills,no nausea and no vomiting  Reports some itching on his abdomen after starting [address] for his Hepatitis C  Patient did not pick his antibiotics because he states it was not sent to the correct pharmacy and didn't call because it was after 4:30  Denies fevers, nausea, vomiting      ROS:  General ROS: negative for - chills, fatigue, fever or night sweats, weight loss  Respiratory ROS: no cough, shortness of breath, or wheezing  Cardiovascular ROS: no chest pain or dyspnea on exertion  Genito-Urinary ROS: no dysuria, trouble voiding, or hematuria  Musculoskeletal ROS: negative for - gait disturbance, joint pain or muscle pain  Neurological ROS: no TIA or stroke symptoms  GI ROS: see HPI  Skin ROS: no new rashes or lesions   Lymphatic ROS: no new adenopathy noted by pt     GYN ROS: see HPI, no new GYN history or bleeding noted  Psy ROS: no new mental or behavioral disturbances         Patient Active Problem List   Diagnosis   • Asthma   • Hepatitis C   • COPD (chronic obstructive pulmonary disease) ([facility])       Allergies:  Bactrim [sulfamethoxazole-trimethoprim], Ciprofloxacin, Dust mite extract, and Sulfa antibiotics      Current Outpatient Medications:   •  amoxicillin-clavulanate (AUGMENTIN) 500-125 mg per tablet, Take 1 tablet by mouth every 12 (twelve) hours for 7 days, Disp: 14 tablet, Rfl: 0  •  Mavyret 100-40 MG TABS, 3 pills daily, Disp: , Rfl:     Past Medical History:   Diagnosis Date   • Asthma    • Bronchitis    • [facility]    • COVID 10/2021   • Hepatitis C    • Infectious viral hepatitis    • Liver disease    • Pneumonia    • Tinnitus        Past Surgical History:   Procedure Laterality Date   • COLOSTOMY N/A 09/26/2022    Procedure: COLOSTOMY END;  Surgeon: Terrie Mallory MD;  [facility];  Service: General   • COLOSTOMY N/A 09/26/2022    Procedure: COLOSTOMY LOOP;  Surgeon: Terrie Mallory MD;  [facility];  Service: General   • KNEE ARTHROSCOPY Left    • LAPAROTOMY N/A 09/26/2022    Procedure: LAPAROTOMY EXPLORATORY, LYSIS OF ADHESIONS, LOW ANTERIOR RESECTION WITH PRIMARY ANASTOMOSIS;  Surgeon: Terrie Mallory MD;  [facility];  Service: General   • MOUTH SURGERY     • DE CLOSE ENTEROSTOMY N/A 11/23/2022    Procedure: CLOSURE COLOSTOMY, LYSIS OF ADEHSIONS, RIGID SIGMOIDOSCOPY;  Surgeon: Terrie Mallory MD;  [facility];  Service: General   • WV PART REMOVAL COLON W ANASTOMOSIS N/A 09/26/2022    Procedure: RESECTION COLON SIGMOID;  Surgeon: Promise House MD;  [facility];  Service: General   • WV PROCTOSIGMOIDOSCOPY,RIGID,DIAGNOS N/A 09/26/2022    Procedure: SIGMOIDOSCOPY RIGID;  Surgeon: Promise House MD;  [facility];  Service: General       History reviewed  No pertinent family history  reports that he has been smoking cigarettes  He has been smoking an average of  25 packs per day  He has never used smokeless tobacco  He reports that he does not currently use alcohol  He reports that he does not currently use drugs after having used the following drugs: Marijuana, Heroin, Methamphetamines, and Fentanyl  Vitals:    12/19/22 0837   BP: 130/70   Pulse: 74   Temp: (!) 95 °F (35 °C)       PHYSICAL EXAM  General: normal, cooperative, no distress  Abdominal: soft, nondistended or nontender  Incision: clean, dry, and intact and healing well; patient does have erythema around old abdominal surgical site for his original ileostomy surgery as well as his ileostomy reversal surgery from 11/23/22  Patient does have a slight rash on his upper abdomen               Jack Barcenas PA-C  Date: 12/19/2022 Time: 8:51 AM

## 2022-12-20 NOTE — PROGRESS NOTES
Assessment/Plan:   Mikaela Kwon is a 65 y o male who comes in today for postoperative check after     Reversal of a loop colostomy on 11/23/2022  He did quite well from surgery and made a great recovery  Colonoscopy showed this polyp in the colon just proximal to the anastomosis  I would recommend a 1 year follow-up colonoscopy  Pathology:  Reviewed with patient, all questions answered  Final Diagnosis   A  Polyp, Colorectal, polyp at 15cm:  - Tubular adenoma, fragments  - Negative for high grade dysplasia/ carcinoma  Postoperative restrictions reviewed  All questions answered  Patient does have erythema around old abdominal surgical site for his original ileostomy surgery as well as his ileostomy reversal surgery from 11/23/22  He states no fevers, or chills, nausea, or vomiting  Prescribed Augmentin for cellulitis prevention  Patient started Augmentin Monday  Yesterday he went to the ER because the center of his incision drained clear drainiage  ______________________________________________________  HPI:  Mikaela Kwon is a 65 y o male who comes in today for postoperative check after recent Reversal of a loop colostomy on 11/23/2022 with Dr Cele Ann  Currently doing well without problems, no fever or chills,no nausea and no vomiting  Reports some itching on his abdomen after starting [address] for his Hepatitis C  Patient did not pick his antibiotics because he states it was not sent to the correct pharmacy and didn't call because it was after 4:30  Denies fevers, nausea, vomiting  Started antibiotics on Monday and states was feeling better but then noticed clear drainage of the middle of his incision and went to the ER  They said to follow up with our office today  Today he states minimal drainage  No fevers, chills, of purulence  Erythema is decreasing around incision       ROS:  General ROS: negative for - chills, fatigue, fever or night sweats, weight loss  Respiratory ROS: no cough, shortness of breath, or wheezing  Cardiovascular ROS: no chest pain or dyspnea on exertion  Genito-Urinary ROS: no dysuria, trouble voiding, or hematuria  Musculoskeletal ROS: negative for - gait disturbance, joint pain or muscle pain  Neurological ROS: no TIA or stroke symptoms  GI ROS: see HPI  Skin ROS: no new rashes or lesions   Lymphatic ROS: no new adenopathy noted by pt  GYN ROS: see HPI, no new GYN history or bleeding noted  Psy ROS: no new mental or behavioral disturbances         Patient Active Problem List   Diagnosis   • Asthma   • Hepatitis C   • COPD (chronic obstructive pulmonary disease) ([facility])       Allergies:  Bactrim [sulfamethoxazole-trimethoprim], Ciprofloxacin, Dust mite extract, and Sulfa antibiotics      Current Outpatient Medications:   •  amoxicillin-clavulanate (AUGMENTIN) 500-125 mg per tablet, Take 1 tablet by mouth every 12 (twelve) hours for 7 days, Disp: 14 tablet, Rfl: 0  •  Mavyret 100-40 MG TABS, 3 pills daily, Disp: , Rfl:   No current facility-administered medications for this visit      Past Medical History:   Diagnosis Date   • Asthma    • Bronchitis    • COPD (chronic obstructive pulmonary disease) ([facility] [address] )    • COVID 10/2021   • Hepatitis C    • Infectious viral hepatitis    • Liver disease    • Pneumonia    • Tinnitus        Past Surgical History:   Procedure Laterality Date   • COLOSTOMY N/A 09/26/2022    Procedure: COLOSTOMY END;  Surgeon: Viviana Preciado MD;  [facility];  Service: General   • COLOSTOMY N/A 09/26/2022    Procedure: COLOSTOMY LOOP;  Surgeon: Viviana Preciado MD;  [facility];  Service: General   • KNEE ARTHROSCOPY Left    • LAPAROTOMY N/A 09/26/2022    Procedure: LAPAROTOMY EXPLORATORY, LYSIS OF ADHESIONS, LOW ANTERIOR RESECTION WITH PRIMARY ANASTOMOSIS;  Surgeon: Viviana Preciado MD;  [facility];  Service: General   • MOUTH SURGERY     • MD CLOSE ENTEROSTOMY N/A 11/23/2022    Procedure: CLOSURE COLOSTOMY, LYSIS OF Marcie Reyes;  Surgeon: Genny Good MD;  [facility];  Service: General   • MI PART REMOVAL COLON W ANASTOMOSIS N/A 09/26/2022    Procedure: RESECTION COLON SIGMOID;  Surgeon: Genny Good MD;  [facility];  Service: General   • MI PROCTOSIGMOIDOSCOPY,RIGID,DIAGNOS N/A 09/26/2022    Procedure: SIGMOIDOSCOPY RIGID;  Surgeon: Genny Good MD;  [facility];  Service: General       History reviewed  No pertinent family history  reports that he has been smoking cigarettes  He has been smoking an average of  25 packs per day  He has never used smokeless tobacco  He reports that he does not currently use alcohol  He reports that he does not currently use drugs after having used the following drugs: Marijuana, Heroin, Methamphetamines, and Fentanyl  Vitals:    12/22/22 0845   BP: 120/80   Pulse: 74   Temp: (!) 96 7 °F (35 9 °C)       PHYSICAL EXAM  General: normal, cooperative, no distress  Abdominal: soft, nondistended or nontender  Incision: clean, dry, and intact and healing well; patient does have erythema around old abdominal surgical site for his original ileostomy surgery as well as his ileostomy reversal surgery from 11/23/22  Mild erythema around patient's surgical sites over the abdomen  No significant TTP  No fluctuant mass or purulent discharge appreciated  No other abdominal TTP           Maribel Landeros PA-C  Date: 12/22/2022 Time: 9:03 AM

## 2022-12-21 ENCOUNTER — HOSPITAL ENCOUNTER (EMERGENCY)
Facility: HOSPITAL | Age: 57
Discharge: HOME | End: 2022-12-21
Attending: EMERGENCY MEDICINE

## 2022-12-21 VITALS
DIASTOLIC BLOOD PRESSURE: 71 MMHG | TEMPERATURE: 97.8 F | RESPIRATION RATE: 16 BRPM | OXYGEN SATURATION: 95 % | HEART RATE: 75 BPM | WEIGHT: 179.68 LBS | SYSTOLIC BLOOD PRESSURE: 125 MMHG | BODY MASS INDEX: 25.06 KG/M2

## 2022-12-21 DIAGNOSIS — Z51.89 VISIT FOR WOUND CHECK: Primary | ICD-10-CM

## 2022-12-21 DIAGNOSIS — R05.9 COUGH: ICD-10-CM

## 2022-12-21 LAB
FLUAV RNA RESP QL NAA+PROBE: NEGATIVE
FLUBV RNA RESP QL NAA+PROBE: NEGATIVE
RSV RNA RESP QL NAA+PROBE: NEGATIVE
SARS-COV-2 RNA RESP QL NAA+PROBE: NEGATIVE

## 2022-12-21 RX ORDER — DEXAMETHASONE 4 MG/1
10 TABLET ORAL ONCE
Status: COMPLETED | OUTPATIENT
Start: 2022-12-21 | End: 2022-12-21

## 2022-12-21 RX ORDER — ALBUTEROL SULFATE 90 UG/1
2 AEROSOL, METERED RESPIRATORY (INHALATION) ONCE
Status: COMPLETED | OUTPATIENT
Start: 2022-12-21 | End: 2022-12-21

## 2022-12-21 RX ADMIN — DEXAMETHASONE 10 MG: 4 TABLET ORAL at 21:48

## 2022-12-21 RX ADMIN — ALBUTEROL SULFATE 2 PUFF: 90 AEROSOL, METERED RESPIRATORY (INHALATION) at 21:48

## 2022-12-22 ENCOUNTER — OFFICE VISIT (OUTPATIENT)
Dept: SURGERY | Facility: CLINIC | Age: 57
End: 2022-12-22

## 2022-12-22 VITALS
HEART RATE: 74 BPM | TEMPERATURE: 96.7 F | DIASTOLIC BLOOD PRESSURE: 80 MMHG | BODY MASS INDEX: 24.81 KG/M2 | HEIGHT: 71 IN | WEIGHT: 177.2 LBS | SYSTOLIC BLOOD PRESSURE: 120 MMHG

## 2022-12-22 DIAGNOSIS — L03.311 CELLULITIS OF ABDOMINAL WALL: Primary | ICD-10-CM

## 2022-12-22 NOTE — DISCHARGE INSTRUCTIONS
Please return to the ED for any concerns as outlined in the AVS or for any other concerns  Please follow-up with your primary care provider in 2 days for re-evaluation and further management  Continue to follow-up with your surgeon at your scheduled appointment tomorrow morning  Continue antibiotics as prescribed

## 2022-12-22 NOTE — ED PROVIDER NOTES
History  Chief Complaint   Patient presents with   • Medical Problem     Patient reports recent surgery, has open wound on abdomen, patient states taking abx     Patient is a 50-year-old male with a reported past medical history significant for asthma, COPD, hepatitis C and s/p recent colon resection, ileostomy and ileostomy reversal presenting to the ED with a complaint of opening over his abdominal surgical scar  Patient reports he had his ileostomy reversal surgery on 11/23/22  States he was seen in the office on 12/15/22 and diagnosed with cellulitis, was prescribed Augmentin at that time but was unable to get the prescription filled  He had another visit with his surgeon on 12/19/2022 where he informed them that he was unable to get the antibiotics, they sent his antibiotics to the correct pharmacy and he started taking the abx that day  States since Monday the redness has since been improving  Reports today he did notice that a small part of the wound opened up and has been draining clear drainage  States the area is tender however he denies any significant change in his pain today  Denies fever, chills, nausea, vomiting, diarrhea, constipation, melena, BRBPR and pus per rectum  Denies any other abdominal pain  Denies any urinary complaints  States he did sweep about 1 week ago in a martin basement and since has been coughing up white sputum  He denies associated shortness of breath, wheezing, stridor, chest pain, palpitations or diaphoresis  Reports he does have a history of asthma and COPD but does not have albuterol treatments at home  Reports he is otherwise been well denies any other complaints today  Prior to Admission Medications   Prescriptions Last Dose Informant Patient Reported? Taking?    Mavyret 100-40 MG TABS   Yes No   Sig: 3 pills daily   amoxicillin-clavulanate (AUGMENTIN) 500-125 mg per tablet   No No   Sig: Take 1 tablet by mouth every 12 (twelve) hours for 7 days Facility-Administered Medications: None       Past Medical History:   Diagnosis Date   • Asthma    • Bronchitis    • COPD (chronic obstructive pulmonary disease) ([facility] [address] )    • COVID 10/2021   • Hepatitis C    • Infectious viral hepatitis    • Liver disease    • Pneumonia    • Tinnitus        Past Surgical History:   Procedure Laterality Date   • COLOSTOMY N/A 09/26/2022    Procedure: COLOSTOMY END;  Surgeon: Jorgito Rosario MD;  [facility];  Service: General   • COLOSTOMY N/A 09/26/2022    Procedure: COLOSTOMY LOOP;  Surgeon: Jorgito Rosario MD;  [facility];  Service: General   • KNEE ARTHROSCOPY Left    • LAPAROTOMY N/A 09/26/2022    Procedure: LAPAROTOMY EXPLORATORY, LYSIS OF ADHESIONS, LOW ANTERIOR RESECTION WITH PRIMARY ANASTOMOSIS;  Surgeon: Jorgito Rosario MD;  [facility];  Service: General   • MOUTH SURGERY     • RI CLOSE ENTEROSTOMY N/A 11/23/2022    Procedure: CLOSURE COLOSTOMY, LYSIS OF ADEHSIONS, RIGID SIGMOIDOSCOPY;  Surgeon: Jorgito Rosario MD;  [facility];  Service: General   • RI PART REMOVAL COLON W ANASTOMOSIS N/A 09/26/2022    Procedure: RESECTION COLON SIGMOID;  Surgeon: Jorgito Rosario MD;  [facility];  Service: General   • RI PROCTOSIGMOIDOSCOPY,RIGID,DIAGNOS N/A 09/26/2022    Procedure: Verlene Spoon RIGID;  Surgeon: Jorgito Rosario MD;  [facility];  Service: General       History reviewed  No pertinent family history  I have reviewed and agree with the history as documented      E-Cigarette/Vaping   • E-Cigarette Use Never User      E-Cigarette/Vaping Substances     Social History     Tobacco Use   • Smoking status: Every Day     Packs/day: 0 25     Types: Cigarettes   • Smokeless tobacco: Never   • Tobacco comments:     Last smoked today 11/23 --> 2-5 cigs daily   Vaping Use   • Vaping Use: Never used   Substance Use Topics   • Alcohol use: Not Currently   • Drug use: Not Currently     Types: Marijuana, Heroin, Methamphetamines, Fentanyl     Comment: nothing since 12/20/2020       Review of Systems   Constitutional: Negative for chills and fever  HENT: Negative for ear pain and sore throat  Eyes: Negative for pain and visual disturbance  Respiratory: Positive for cough  Negative for shortness of breath  Cardiovascular: Negative for chest pain and palpitations  Gastrointestinal: Negative for abdominal pain, diarrhea, nausea and vomiting  Genitourinary: Negative for dysuria and hematuria  Musculoskeletal: Negative for arthralgias and back pain  Skin: Positive for wound  Negative for color change and rash  Neurological: Negative for seizures, syncope, weakness and headaches  Psychiatric/Behavioral: Negative for confusion  All other systems reviewed and are negative  Physical Exam  Physical Exam  Vitals and nursing note reviewed  Constitutional:       General: He is not in acute distress  Appearance: He is well-developed  HENT:      Head: Normocephalic and atraumatic  Eyes:      Conjunctiva/sclera: Conjunctivae normal    Cardiovascular:      Rate and Rhythm: Normal rate and regular rhythm  Heart sounds: No murmur heard  Pulmonary:      Effort: Pulmonary effort is normal  No respiratory distress  Breath sounds: Normal breath sounds  Comments: Mild expiratory wheeze throughout all lung fields bilaterally  Adequate air movement  No rhonchi, rales, increased work of breathing or respiratory distress  O2 sat-95%    Abdominal:      Palpations: Abdomen is soft  Tenderness: There is no abdominal tenderness  Comments: Mild erythema around patient's surgical sites over the abdomen  No significant TTP  Small open area where pictured  Mild amount of clear discharge  No fluctuant mass or purulent discharge appreciated  No other abdominal TTP  Musculoskeletal:         General: No swelling  Cervical back: Neck supple  Skin:     General: Skin is warm and dry        Capillary Refill: Capillary refill takes less than 2 seconds  Neurological:      Mental Status: He is alert  Psychiatric:         Mood and Affect: Mood normal                Vital Signs  ED Triage Vitals [12/21/22 2117]   Temperature Pulse Respirations Blood Pressure SpO2   97 8 °F (36 6 °C) 75 16 125/71 95 %      Temp Source Heart Rate Source Patient Position - Orthostatic VS BP Location FiO2 (%)   Oral Monitor Sitting Right arm --      Pain Score       --           Vitals:    12/21/22 2117   BP: 125/71   Pulse: 75   Patient Position - Orthostatic VS: Sitting         Visual Acuity      ED Medications  Medications   albuterol (PROVENTIL HFA,VENTOLIN HFA) inhaler 2 puff (2 puffs Inhalation Given 12/21/22 2148)   dexamethasone (DECADRON) tablet 10 mg (10 mg Oral Given 12/21/22 2148)       Diagnostic Studies  Results Reviewed     Procedure Component Value Units Date/Time    FLU/RSV/COVID - if FLU/RSV clinically relevant [[id number]]  (Normal) Collected: 12/21/22 2147    Lab Status: Final result Specimen: Nares from Nose Updated: 12/21/22 2230     SARS-CoV-2 Negative     INFLUENZA A PCR Negative     INFLUENZA B PCR Negative     RSV PCR Negative    Narrative:      FOR PEDIATRIC PATIENTS - copy/paste COVID Guidelines URL to browser: [email or web address]  [email or web address]    SARS-CoV-2 assay is a Nucleic Acid Amplification assay intended for the  qualitative detection of nucleic acid from SARS-CoV-2 in nasopharyngeal  swabs  Results are for the presumptive identification of SARS-CoV-2 RNA  Positive results are indicative of infection with SARS-CoV-2, the virus  causing COVID-19, but do not rule out bacterial infection or co-infection  with other viruses  Laboratories within the [place] and its  territories are required to report all positive results to the appropriate  public health authorities   Negative results do not preclude SARS-CoV-2  infection and should not be used as the sole basis for treatment or other  patient management decisions  Negative results must be combined with  clinical observations, patient history, and epidemiological information  This test has not been FDA cleared or approved  This test has been authorized by FDA under an Emergency Use Authorization  (EUA)  This test is only authorized for the duration of time the  declaration that circumstances exist justifying the authorization of the  emergency use of an in vitro diagnostic tests for detection of SARS-CoV-2  virus and/or diagnosis of COVID-19 infection under section 564(b)(1) of  the Act, 21 U  S C  989VWJ-5(F)(4), unless the authorization is terminated  or revoked sooner  The test has been validated but independent review by FDA  and CLIA is pending  Test performed using "[employer] GeneXpert: This RT-PCR assay targets N2,  a region unique to SARS-CoV-2  A conserved region in the E-gene was chosen  for pan-Sarbecovirus detection which includes SARS-CoV-2  According to CMS-2020-01-R, this platform meets the definition of high-throughput technology  No orders to display              Procedures  Procedures         ED Course                                             MDM  Number of Diagnoses or Management Options     Amount and/or Complexity of Data Reviewed  Clinical lab tests: ordered and reviewed    Risk of Complications, Morbidity, and/or Mortality  General comments: Patient is a 60-year-old male with recent abdominal surgery for partial bowel resection, had ileostomy and then had his ileostomy reversed on 11/23/22  Has been on antibiotics since Monday 12/19 for cellulitis around surgical site  States that he had a small opening around the wound which concerned him, bringing him into the ED today  Also reports he is coughing up white sputum after sweeping a martin basement  No other complaints today and reports he has otherwise been well    Patient does have a small opening over the vertical incision site over the abdomen however no fluctuant mass, purulent discharge, significant erythema or significant TTP  Patient is already on Augmentin and taking as prescribed  He does have a follow-up appointment with his surgeon at 845am tomorrow  He also has a very slight wheeze with exhalation  Otherwise no other concerning respiratory findings  O2 sat-95%  Will provide patient with albuterol inhaler to take now and will be able to take home with him as he ran out of at [facility] albuterol treatments  Will give 1 dose of Decadron in light of possible asthma  In a patient with no purulent sputum production, no complaints of worsening shortness of breath, no fevers or chills will defer antibiotics at this time  Will test for c/f/r as well in lgiht of cough  Advised patient to follow-up with his surgeon tomorrow morning as scheduled and to follow-up with his PCP as soon as reasonably possible for reevaluation and further management  Strict return precautions verbally communicated to the patient as outlined in the AVS   All patient questions and concerns were answered  Patient verbally communicated their understanding and agreement to the above plan  Patient stable discharge  Disposition  Final diagnoses:   Visit for wound check   Cough     Time reflects when diagnosis was documented in both MDM as applicable and the Disposition within this note     Time User Action Codes Description Comment    12/21/2022  9:56 PM Christofer Nisha Add [Z51 [id number]] Visit for wound check     12/21/2022  9:56 PM Christofer Nisha Add [R05 [id number]] Cough       ED Disposition     ED Disposition   Discharge    Condition   Stable    Date/Time   Wed Dec 21, 2022  9:56 PM    Comment   Melissa Hailetise discharge to home/self care                 Follow-up Information     Follow up With Specialties Details Why Contact Info Additional [address] [facility] Emergency Medicine Go to  As needed, If symptoms worsen [facility] [id number]  [address] [facility], [address] [facility] [address]  , [place], [place], [address]          Discharge Medication List as of 12/21/2022  9:58 PM      CONTINUE these medications which have NOT CHANGED    Details   amoxicillin-clavulanate (AUGMENTIN) 500-125 mg per tablet Take 1 tablet by mouth every 12 (twelve) hours for 7 days, Starting Mon 12/19/2022, Until Mon 12/26/2022, Normal      Mavyret 100-40 MG TABS 3 pills daily, Starting Tue 12/13/2022, Historical Med             No discharge procedures on file      PDMP Review       Value Time User    PDMP Reviewed  Yes 9/29/2022  8:45 AM Charles Dill PA-C          ED Provider  Electronically Signed by           Sedrick Olivera PA-C  12/22/22 9250

## 2023-01-16 NOTE — PROGRESS NOTES
Assessment/Plan:   Jax Broderick is a 65 y o male who comes in today for postoperative check after     Here for a wound check: The wound did open up into little spots and he does have red subcutaneous nodules like small infections around the wound almost like sebaceous cyst   These are like pimples starting around the satellites of the wound  There is not a central erythematous spot  Chart Silvadene cream and local wound care  See back in 3 weeks  Clindamycin as well  Plan :     2  Colono Fall 2023     Reversal of a loop colostomy on 11/23/2022  He did quite well from surgery and made a great recovery  Colonoscopy showed this polyp in the colon just proximal to the anastomosis  I would recommend a 1 year follow-up colonoscopy  Pathology:  Reviewed with patient, all questions answered  Final Diagnosis   A  Polyp, Colorectal, polyp at 15cm:  - Tubular adenoma, fragments  - Negative for high grade dysplasia/ carcinoma  Postoperative restrictions reviewed  All questions answered  Patient does have erythema around old abdominal surgical site for his original ileostomy surgery as well as his ileostomy reversal surgery from 11/23/22  He states no fevers, or chills, nausea, or vomiting  Prescribed Augmentin for cellulitis prevention  Patient started Augmentin Monday  Yesterday he went to the ER because the center of his incision drained clear drainiage  ______________________________________________________  HPI:  Jax Broderick is a 65 y o male who comes in today for postoperative check after recent Reversal of a loop colostomy on 11/23/2022 with Dr Yana Esparza  Currently doing well without problems, no fever or chills,no nausea and no vomiting  Reports some itching on his abdomen after starting [address] for his Hepatitis C  Patient did not pick his antibiotics because he states it was not sent to the correct pharmacy and didn't call because it was after 4:30  Denies fevers, nausea, vomiting  Started antibiotics on Monday and states was feeling better but then noticed clear drainage of the middle of his incision and went to the ER  They said to follow up with our office today  Today he states minimal drainage  No fevers, chills, of purulence  Erythema is decreasing around incision  ROS:  General ROS: negative for - chills, fatigue, fever or night sweats, weight loss  Respiratory ROS: no cough, shortness of breath, or wheezing  Cardiovascular ROS: no chest pain or dyspnea on exertion  Genito-Urinary ROS: no dysuria, trouble voiding, or hematuria  Musculoskeletal ROS: negative for - gait disturbance, joint pain or muscle pain  Neurological ROS: no TIA or stroke symptoms  GI ROS: see HPI  Skin ROS: no new rashes or lesions   Lymphatic ROS: no new adenopathy noted by pt     GYN ROS: see HPI, no new GYN history or bleeding noted  Psy ROS: no new mental or behavioral disturbances         Patient Active Problem List   Diagnosis   • Asthma   • Hepatitis C   • COPD (chronic obstructive pulmonary disease) ([facility])       Allergies:  Bactrim [sulfamethoxazole-trimethoprim], Ciprofloxacin, Dust mite extract, and Sulfa antibiotics      Current Outpatient Medications:   •  albuterol (PROVENTIL HFA,VENTOLIN HFA) 90 mcg/act inhaler, INHALE 2 PUFFS EVERY 6 HOURS AS NEEDED FOR WHEEZING, Disp: , Rfl:   •  Mavyret 100-40 MG TABS, 3 pills daily, Disp: , Rfl:   •  clindamycin (CLEOCIN) 300 MG capsule, Take 1 capsule (300 mg total) by mouth 3 (three) times a day for 10 days, Disp: 30 capsule, Rfl: 0    Past Medical History:   Diagnosis Date   • Asthma    • Bronchitis    • COPD (chronic obstructive pulmonary disease) ([facility] [place] [address] )    • COVID 10/2021   • Hepatitis C    • Infectious viral hepatitis    • Liver disease    • Pneumonia    • Tinnitus        Past Surgical History:   Procedure Laterality Date   • COLONOSCOPY W/ POLYPECTOMY  11/22/2022    6 MONTHS = MUST   • COLOSTOMY N/A 09/26/2022    Procedure: COLOSTOMY END;  Surgeon: Koko Tran MD;  [facility];  Service: General   • COLOSTOMY N/A 09/26/2022    Procedure: COLOSTOMY LOOP;  Surgeon: Koko Tran MD;  [facility];  Service: General   • KNEE ARTHROSCOPY Left    • LAPAROTOMY N/A 09/26/2022    Procedure: LAPAROTOMY EXPLORATORY, LYSIS OF ADHESIONS, LOW ANTERIOR RESECTION WITH PRIMARY ANASTOMOSIS;  Surgeon: Koko Tran MD;  [facility];  Service: General   • MOUTH SURGERY     • ID CLOSURE ENTEROSTOMY LG/SMALL INTESTINE N/A 11/23/2022    Procedure: CLOSURE COLOSTOMY, LYSIS OF ADEHSIONS, RIGID SIGMOIDOSCOPY;  Surgeon: Koko Tran MD;  [facility];  Service: General   • ID COLECTOMY PARTIAL W/ANASTOMOSIS N/A 09/26/2022    Procedure: RESECTION COLON SIGMOID;  Surgeon: Koko Tran MD;  [facility];  Service: General   • ID [address] RGD DX W/WO [facility] N/A 09/26/2022    Procedure: SIGMOIDOSCOPY RIGID;  Surgeon: Koko Tran MD;  [facility];  Service: General       History reviewed  No pertinent family history  reports that he has been smoking cigarettes  He has been smoking an average of  25 packs per day  He has never used smokeless tobacco  He reports that he does not currently use alcohol  He reports that he does not currently use drugs after having used the following drugs: Marijuana, Heroin, Methamphetamines, and Fentanyl  Vitals:    01/19/23 1334   BP: 122/72   Pulse: 82   Temp: (!) 96 9 °F (36 1 °C)       PHYSICAL EXAM  General: normal, cooperative, no distress  Abdominal: soft, nondistended or nontender  Incision: clean, dry, and intact and healing well; patient does have erythema around old abdominal surgical site for his original ileostomy surgery as well as his ileostomy reversal surgery from 11/23/22  Mild erythema around patient's surgical sites over the abdomen  No significant TTP  No fluctuant mass or purulent discharge appreciated    No other abdominal TTP  See photo up above  Multiple satellite pimples of unclear etiology  Placed him back on clindamycin and Silvadene cream for dressing      Nishant Herrera PA-C  Date: 1/19/2023 Time: 1:52 PM

## 2023-01-19 ENCOUNTER — OFFICE VISIT (OUTPATIENT)
Dept: SURGERY | Facility: CLINIC | Age: 58
End: 2023-01-19

## 2023-01-19 VITALS
DIASTOLIC BLOOD PRESSURE: 72 MMHG | BODY MASS INDEX: 24.56 KG/M2 | HEIGHT: 71 IN | HEART RATE: 82 BPM | TEMPERATURE: 96.9 F | WEIGHT: 175.4 LBS | SYSTOLIC BLOOD PRESSURE: 122 MMHG

## 2023-01-19 DIAGNOSIS — Z51.89 ENCOUNTER FOR WOUND CARE: Primary | ICD-10-CM

## 2023-01-19 RX ORDER — CLINDAMYCIN HYDROCHLORIDE 300 MG/1
300 CAPSULE ORAL 3 TIMES DAILY
Qty: 30 CAPSULE | Refills: 0 | Status: SHIPPED | OUTPATIENT
Start: 2023-01-19 | End: 2023-01-29

## 2023-01-19 RX ORDER — ALBUTEROL SULFATE 90 UG/1
AEROSOL, METERED RESPIRATORY (INHALATION)
COMMUNITY
Start: 2023-01-05

## 2023-02-09 ENCOUNTER — OFFICE VISIT (OUTPATIENT)
Dept: SURGERY | Facility: CLINIC | Age: 58
End: 2023-02-09

## 2023-02-09 VITALS
HEART RATE: 76 BPM | BODY MASS INDEX: 25.62 KG/M2 | TEMPERATURE: 97.5 F | HEIGHT: 71 IN | DIASTOLIC BLOOD PRESSURE: 80 MMHG | WEIGHT: 183 LBS | SYSTOLIC BLOOD PRESSURE: 130 MMHG

## 2023-02-09 DIAGNOSIS — K43.2 INCISIONAL HERNIA: Primary | ICD-10-CM

## 2023-02-09 DIAGNOSIS — K43.2 INCISIONAL HERNIA, WITHOUT OBSTRUCTION OR GANGRENE: Primary | ICD-10-CM

## 2023-02-09 NOTE — PROGRESS NOTES
Assessment/Plan:   Mikaela Kwon is a 65 y o male who comes in today for a possible hernia s/p reversal of a loop colostomy on 11/23/2022  Plan :    1  CT abd/pelvis with contrast to distinguish how large the hernia is to plan surgical intervention  We dd review strangulation/incarceration symptoms and when we go to ER for care  2  Colono Fall 2023     Reversal of a loop colostomy on 11/23/2022  He did quite well from surgery and made a great recovery  Colonoscopy showed this polyp in the colon just proximal to the anastomosis  I would recommend a 1 year follow-up colonoscopy  Pathology:  Reviewed with patient, all questions answered  Final Diagnosis   A  Polyp, Colorectal, polyp at 15cm:  - Tubular adenoma, fragments  - Negative for high grade dysplasia/ carcinoma  Patient is aware if he continue to smoke tobacco he is at risk for hernia reoccurrence  Postoperative restrictions reviewed  All questions answered  ______________________________________________________  HPI:  Mikaela Kwon is a 65 y o male who comes in today for postoperative check for a abdominal lump after recent Reversal of a loop colostomy on 11/23/2022 with Dr Cele Ann  Currently doing well without problems, no fever or chills,no nausea and no vomiting  Reports some itching on his abdomen after starting [address] for his Hepatitis C  Patient did not pick his antibiotics because he states it was not sent to the correct pharmacy and didn't call because it was after 4:30  Denies fevers, nausea, vomiting  Started antibiotics on Monday and states was feeling better but then noticed clear drainage of the middle of his incision and went to the ER  They said to follow up with our office today  Today he states minimal drainage  No fevers, chills, of purulence  Erythema is decreasing around incision       2/9/23: Patient states over the last few weeks he has felt his abdomen enlarging and bulging especially lower right abdomen that is painful especially with coughing, lifting, standing, moving  He says the pain is better when he is not lifting  He states he is trying to quit smoking and only smokes a few cigarettes a day  No nuasea, vomiting, fevers  ROS:  General ROS: negative for - chills, fatigue, fever or night sweats, weight loss  Respiratory ROS: no cough, shortness of breath, or wheezing  Cardiovascular ROS: no chest pain or dyspnea on exertion  Genito-Urinary ROS: no dysuria, trouble voiding, or hematuria  Musculoskeletal ROS: negative for - gait disturbance, joint pain or muscle pain  Neurological ROS: no TIA or stroke symptoms  GI ROS: see HPI  Skin ROS: no new rashes or lesions   Lymphatic ROS: no new adenopathy noted by pt     GYN ROS: see HPI, no new GYN history or bleeding noted  Psy ROS: no new mental or behavioral disturbances         Patient Active Problem List   Diagnosis   • Asthma   • Hepatitis C   • COPD (chronic obstructive pulmonary disease) (HCC)       Allergies:  Bactrim [sulfamethoxazole-trimethoprim], Ciprofloxacin, Dust mite extract, and Sulfa antibiotics      Current Outpatient Medications:   •  albuterol (PROVENTIL HFA,VENTOLIN HFA) 90 mcg/act inhaler, INHALE 2 PUFFS EVERY 6 HOURS AS NEEDED FOR WHEEZING, Disp: , Rfl:   •  Mavyret 100-40 MG TABS, 3 pills daily, Disp: , Rfl:     Past Medical History:   Diagnosis Date   • Asthma    • Bronchitis    • COPD (chronic obstructive pulmonary disease) (HCC)    • COVID 10/2021   • Hepatitis C    • Infectious viral hepatitis    • Liver disease    • Pneumonia    • Tinnitus        Past Surgical History:   Procedure Laterality Date   • COLONOSCOPY W/ POLYPECTOMY  11/22/2022    6 MONTHS = MUST   • COLOSTOMY N/A 09/26/2022    Procedure: COLOSTOMY END;  Surgeon: Jake Ruiz MD;  [facility];  Service: General   • COLOSTOMY N/A 09/26/2022    Procedure: COLOSTOMY LOOP;  Surgeon: Jake Ruiz MD;  [facility];  Service: General   • KNEE ARTHROSCOPY Left    • LAPAROTOMY N/A 09/26/2022    Procedure: LAPAROTOMY EXPLORATORY, LYSIS OF ADHESIONS, LOW ANTERIOR RESECTION WITH PRIMARY ANASTOMOSIS;  Surgeon: Francisca Coto MD;  [facility];  Service: General   • MOUTH SURGERY     • ID CLOSURE ENTEROSTOMY LG/SMALL INTESTINE N/A 11/23/2022    Procedure: CLOSURE COLOSTOMY, LYSIS OF ADEHSIONS, RIGID SIGMOIDOSCOPY;  Surgeon: Francisca Coto MD;  [facility];  Service: General   • ID COLECTOMY PARTIAL W/ANASTOMOSIS N/A 09/26/2022    Procedure: RESECTION COLON SIGMOID;  Surgeon: Francisca Coto MD;  [facility];  Service: General   • ID [address] RGD DX W/WO COLLJ [address] [facility] [address] [facility] N/A 09/26/2022    Procedure: SIGMOIDOSCOPY RIGID;  Surgeon: Francisca Coto MD;  [facility];  Service: General       No family history on file  reports that he has been smoking cigarettes  He has been smoking an average of  25 packs per day  He has never used smokeless tobacco  He reports that he does not currently use alcohol  He reports that he does not currently use drugs after having used the following drugs: Marijuana, Heroin, Methamphetamines, and Fentanyl  There were no vitals filed for this visit  PHYSICAL EXAM  General: normal, cooperative, no distress  Abdominal: soft, nondistended or non-tender - There is bulging of this lower abdomen especially left lower side - reducible - fascial edges are hard to appreciate  Incision: clean, dry, and intact and healing well; patient does have erythema around old abdominal surgical site for his original ileostomy surgery as well as his ileostomy reversal surgery from 11/23/22  No significant TTP  No fluctuant mass or purulent discharge appreciated  No other abdominal TTP       Maricel Huntley PA-C  Date: 2/9/2023 Time: 2:27 PM

## 2023-02-25 ENCOUNTER — HOSPITAL ENCOUNTER (OUTPATIENT)
Dept: CT IMAGING | Facility: HOSPITAL | Age: 58
Discharge: HOME | End: 2023-02-25
Attending: SURGERY

## 2023-02-25 DIAGNOSIS — K43.2 INCISIONAL HERNIA: ICD-10-CM

## 2023-02-25 RX ADMIN — IOHEXOL 100 ML: 350 INJECTION, SOLUTION INTRAVENOUS at 14:37

## 2023-03-02 ENCOUNTER — OFFICE VISIT (OUTPATIENT)
Dept: SURGERY | Facility: CLINIC | Age: 58
End: 2023-03-02

## 2023-03-02 VITALS
SYSTOLIC BLOOD PRESSURE: 110 MMHG | OXYGEN SATURATION: 94 % | HEIGHT: 71 IN | RESPIRATION RATE: 18 BRPM | HEART RATE: 87 BPM | DIASTOLIC BLOOD PRESSURE: 60 MMHG | BODY MASS INDEX: 26.18 KG/M2 | TEMPERATURE: 97.6 F | WEIGHT: 187 LBS

## 2023-03-02 DIAGNOSIS — K43.2 INCISIONAL HERNIA, WITHOUT OBSTRUCTION OR GANGRENE: Primary | ICD-10-CM

## 2023-03-02 NOTE — PROGRESS NOTES
Assessment/Plan:   José Luis Hurley is a 65 y o male who comes in today for hernia follow up to schedule surgery  Plan :    1  Diagnostic lap possible robotic possible open for incisional hernia repair  This was reviewed with dr Nilda Patel and planned  This is a complicated incisional hernia and does include some bladder  2  Colono Fall 2023   3  Patient has finished and has cured of his Hep c      Reversal of a loop colostomy on 11/23/2022  He did quite well from surgery and made a great recovery  Colonoscopy showed this polyp in the colon just proximal to the anastomosis  I would recommend a 1 year follow-up colonoscopy  Pathology:  Reviewed with patient, all questions answered  Final Diagnosis   A  Polyp, Colorectal, polyp at 15cm:  - Tubular adenoma, fragments  - Negative for high grade dysplasia/ carcinoma  Patient is aware if he continue to smoke tobacco he is at risk for hernia reoccurrence  Postoperative restrictions reviewed  All questions answered  ______________________________________________________  HPI:  José Luis Hurley is a 65 y o male who comes in today for postoperative check for a abdominal lump after recent Reversal of a loop colostomy on 11/23/2022 with Dr Nilda Patel  Currently doing well without problems, no fever or chills,no nausea and no vomiting  Reports some itching on his abdomen after starting [address] for his Hepatitis C, now finished treamtnet    Patient did not pick his antibiotics because he states it was not sent to the correct pharmacy and didn't call because it was after 4:30  Denies fevers, nausea, vomiting  Started antibiotics on Monday and states was feeling better but then noticed clear drainage of the middle of his incision and went to the ER  They said to follow up with our office today  Today he states minimal drainage  No fevers, chills, of purulence  Erythema is decreasing around incision       2/9/23: Patient states over the last few weeks he has felt his abdomen enlarging and bulging especially lower right abdomen that is painful especially with coughing, lifting, standing, moving  He says the pain is better when he is not lifting  He states he is trying to quit smoking and only smokes a few cigarettes a day  No nuasea, vomiting, fevers  ROS:  General ROS: negative for - chills, fatigue, fever or night sweats, weight loss  Respiratory ROS: no cough, shortness of breath, or wheezing  Cardiovascular ROS: no chest pain or dyspnea on exertion  Genito-Urinary ROS: no dysuria, trouble voiding, or hematuria  Musculoskeletal ROS: negative for - gait disturbance, joint pain or muscle pain  Neurological ROS: no TIA or stroke symptoms  GI ROS: see HPI  Skin ROS: no new rashes or lesions   Lymphatic ROS: no new adenopathy noted by pt  GYN ROS: see HPI, no new GYN history or bleeding noted  Psy ROS: no new mental or behavioral disturbances         Patient Active Problem List   Diagnosis   • Asthma   • Hepatitis C   • COPD (chronic obstructive pulmonary disease) ([facility])       Allergies:  Bactrim [sulfamethoxazole-trimethoprim], Ciprofloxacin, Dust mite extract, and Sulfa antibiotics    No current outpatient medications on file      Past Medical History:   Diagnosis Date   • Asthma    • Bronchitis    • COPD (chronic obstructive pulmonary disease) ([facility] [address] )    • COVID 10/2021   • Hepatitis C    • Infectious viral hepatitis    • Liver disease    • Pneumonia    • Tinnitus        Past Surgical History:   Procedure Laterality Date   • COLONOSCOPY W/ POLYPECTOMY  11/22/2022    6 MONTHS = MUST   • COLOSTOMY N/A 09/26/2022    Procedure: COLOSTOMY END;  Surgeon: Koko Tran MD;  [facility];  Service: General   • COLOSTOMY N/A 09/26/2022    Procedure: COLOSTOMY LOOP;  Surgeon: Koko Tran MD;  [facility];  Service: General   • KNEE ARTHROSCOPY Left    • LAPAROTOMY N/A 09/26/2022    Procedure: LAPAROTOMY EXPLORATORY, LYSIS OF ADHESIONS, LOW ANTERIOR RESECTION WITH PRIMARY ANASTOMOSIS;  Surgeon: Yesenia Morton MD;  [facility];  Service: General   • MOUTH SURGERY     • [place] CLOSURE ENTEROSTOMY LG/SMALL INTESTINE N/A 11/23/2022    Procedure: CLOSURE COLOSTOMY, LYSIS OF ADEHSIONS, RIGID SIGMOIDOSCOPY;  Surgeon: Yesenia Morton MD;  [facility];  Service: General   • AZ COLECTOMY PARTIAL W/ANASTOMOSIS N/A 09/26/2022    Procedure: RESECTION COLON SIGMOID;  Surgeon: Yesenia Morton MD;  [facility];  Service: General   • AZ [address] RGD DX W/WO COLLJ [facility] N/A 09/26/2022    Procedure: SIGMOIDOSCOPY RIGID;  Surgeon: Yesenia Morton MD;  [facility];  Service: General       History reviewed  No pertinent family history  reports that he has been smoking cigarettes  He has been smoking an average of  25 packs per day  He has never used smokeless tobacco  He reports that he does not currently use alcohol  He reports that he does not currently use drugs after having used the following drugs: Marijuana, Heroin, Methamphetamines, and Fentanyl  Vitals:    03/02/23 1435   BP: 110/60   Pulse: 87   Resp: 18   Temp: 97 6 °F (36 4 °C)   SpO2: 94%       PHYSICAL EXAM  General: normal, cooperative, no distress    Lungs: normal bilaterally  Heart sounds normal S1 and S2  Abdominal: soft, nondistended or non-tender - There is bulging of this lower abdomen especially left lower side - reducible - fascial edges are hard to appreciate  Incision: clean, dry, and intact and healing well; patient does have erythema around old abdominal surgical site for his original ileostomy surgery as well as his ileostomy reversal surgery from 11/23/22  No significant TTP  No fluctuant mass or purulent discharge appreciated  No other abdominal TTP        Carmelo Green PA-C  Date: 3/2/2023 Time: 4:29 PM

## 2023-03-12 ENCOUNTER — ANESTHESIA EVENT (OUTPATIENT)
Dept: PERIOP | Facility: HOSPITAL | Age: 58
End: 2023-03-12

## 2023-03-14 NOTE — H&P (VIEW-ONLY)
Assessment/Plan:   Avelina Owen is a 65 y o male who comes in today for hernia follow up to schedule surgery  H&P today  Plan :    1  Diagnostic lap possible robotic possible open for incisional hernia repair  This was reviewed with dr Smith Speaker and planned  This is a complicated incisional hernia and does include some bladder  2  Colono Fall 2023   3  Patient has finished and has cured of his Hep c      Reversal of a loop colostomy on 11/23/2022  He did quite well from surgery and made a great recovery  Colonoscopy showed this polyp in the colon just proximal to the anastomosis  I would recommend a 1 year follow-up colonoscopy  Pathology:  Reviewed with patient, all questions answered  Final Diagnosis   A  Polyp, Colorectal, polyp at 15cm:  - Tubular adenoma, fragments  - Negative for high grade dysplasia/ carcinoma  Patient is aware if he continue to smoke tobacco he is at risk for hernia reoccurrence  Postoperative restrictions reviewed  All questions answered  ______________________________________________________  HPI:  Avelina Owen is a 65 y o male who comes in today for postoperative check for a abdominal lump after recent Reversal of a loop colostomy on 11/23/2022 with Dr Smith Speaker  Currently doing well without problems, no fever or chills,no nausea and no vomiting  Reports some itching on his abdomen after starting [address] for his Hepatitis C, now finished treamtnet    Patient did not pick his antibiotics because he states it was not sent to the correct pharmacy and didn't call because it was after 4:30  Denies fevers, nausea, vomiting  Started antibiotics on Monday and states was feeling better but then noticed clear drainage of the middle of his incision and went to the ER  They said to follow up with our office today  Today he states minimal drainage  No fevers, chills, of purulence  Erythema is decreasing around incision       2/9/23: Patient states over the last few weeks he has felt his abdomen enlarging and bulging especially lower right abdomen that is painful especially with coughing, lifting, standing, moving  He says the pain is better when he is not lifting  He states he is trying to quit smoking and only smokes a few cigarettes a day  No nuasea, vomiting, fevers  ROS:  General ROS: negative for - chills, fatigue, fever or night sweats, weight loss  Respiratory ROS: no cough, shortness of breath, or wheezing  Cardiovascular ROS: no chest pain or dyspnea on exertion  Genito-Urinary ROS: no dysuria, trouble voiding, or hematuria  Musculoskeletal ROS: negative for - gait disturbance, joint pain or muscle pain  Neurological ROS: no TIA or stroke symptoms  GI ROS: see HPI  Skin ROS: no new rashes or lesions   Lymphatic ROS: no new adenopathy noted by pt  GYN ROS: see HPI, no new GYN history or bleeding noted  Psy ROS: no new mental or behavioral disturbances         Patient Active Problem List   Diagnosis   • Asthma   • Hepatitis C   • COPD (chronic obstructive pulmonary disease) ([facility])       Allergies:  Bactrim [sulfamethoxazole-trimethoprim], Ciprofloxacin, Dust mite extract, and Sulfa antibiotics    No current outpatient medications on file      Past Medical History:   Diagnosis Date   • Asthma    • Bronchitis    • COPD (chronic obstructive pulmonary disease) ([facility] [address] )    • COVID 10/2021   • Hepatitis C    • Infectious viral hepatitis    • Liver disease    • Pneumonia    • Tinnitus        Past Surgical History:   Procedure Laterality Date   • COLONOSCOPY W/ POLYPECTOMY  11/22/2022    6 MONTHS = MUST   • COLOSTOMY N/A 09/26/2022    Procedure: COLOSTOMY END;  Surgeon: Roxanne Miles MD;  [facility];  Service: General   • COLOSTOMY N/A 09/26/2022    Procedure: COLOSTOMY LOOP;  Surgeon: Roxanne Miles MD;  [facility];  Service: General   • KNEE ARTHROSCOPY Left    • LAPAROTOMY N/A 09/26/2022    Procedure: LAPAROTOMY EXPLORATORY, LYSIS OF ADHESIONS, LOW ANTERIOR RESECTION WITH PRIMARY ANASTOMOSIS;  Surgeon: Juan Welch MD;  [facility];  Service: General   • MOUTH SURGERY     • [place] CLOSURE ENTEROSTOMY LG/SMALL INTESTINE N/A 11/23/2022    Procedure: CLOSURE COLOSTOMY, LYSIS OF ADEHSIONS, RIGID SIGMOIDOSCOPY;  Surgeon: Juan Welch MD;  [facility];  Service: General   • [place] COLECTOMY PARTIAL W/ANASTOMOSIS N/A 09/26/2022    Procedure: RESECTION COLON SIGMOID;  Surgeon: Juan Welch MD;  [facility];  Service: General   • [place] [address] RGD DX W/WO COLLJ [address] [facility] [address]/ [facility] N/A 09/26/2022    Procedure: SIGMOIDOSCOPY RIGID;  Surgeon: Juan Welch MD;  [facility];  Service: General       History reviewed  No pertinent family history  reports that he has been smoking cigarettes  He has been smoking an average of  25 packs per day  He has never used smokeless tobacco  He reports that he does not currently use alcohol  He reports that he does not currently use drugs after having used the following drugs: Marijuana, Heroin, Methamphetamines, and Fentanyl  Vitals:    03/21/23 1030   BP: 130/70   Pulse: 72   Temp: (!) 95 4 °F (35 2 °C)       PHYSICAL EXAM  General: normal, cooperative, no distress    Lungs: normal bilaterally  Heart sounds normal S1 and S2  Abdominal: soft, nondistended or non-tender - There is bulging of this lower abdomen especially left lower side - reducible - fascial edges are hard to appreciate  Incision: clean, dry, and intact and healing well; patient does have erythema around old abdominal surgical site for his original ileostomy surgery as well as his ileostomy reversal surgery from 11/23/22  No significant TTP  No fluctuant mass or purulent discharge appreciated  No other abdominal TTP        Hamzah Hardy PA-C  Date: 3/21/2023 Time: 10:39 AM

## 2023-03-14 NOTE — PROGRESS NOTES
Assessment/Plan:   Hannah Munson is a 65 y o male who comes in today for hernia follow up to schedule surgery  H&P today  Plan :    1  Diagnostic lap possible robotic possible open for incisional hernia repair  This was reviewed with dr Dhara Mendoza and planned  This is a complicated incisional hernia and does include some bladder  2  Colono Fall 2023   3  Patient has finished and has cured of his Hep c      Reversal of a loop colostomy on 11/23/2022  He did quite well from surgery and made a great recovery  Colonoscopy showed this polyp in the colon just proximal to the anastomosis  I would recommend a 1 year follow-up colonoscopy  Pathology:  Reviewed with patient, all questions answered  Final Diagnosis   A  Polyp, Colorectal, polyp at 15cm:  - Tubular adenoma, fragments  - Negative for high grade dysplasia/ carcinoma  Patient is aware if he continue to smoke tobacco he is at risk for hernia reoccurrence  Postoperative restrictions reviewed  All questions answered  ______________________________________________________  HPI:  Hannah Munson is a 65 y o male who comes in today for postoperative check for a abdominal lump after recent Reversal of a loop colostomy on 11/23/2022 with Dr Dhara Mendoza  Currently doing well without problems, no fever or chills,no nausea and no vomiting  Reports some itching on his abdomen after starting [address] for his Hepatitis C, now finished treamtnet    Patient did not pick his antibiotics because he states it was not sent to the correct pharmacy and didn't call because it was after 4:30  Denies fevers, nausea, vomiting  Started antibiotics on Monday and states was feeling better but then noticed clear drainage of the middle of his incision and went to the ER  They said to follow up with our office today  Today he states minimal drainage  No fevers, chills, of purulence  Erythema is decreasing around incision       2/9/23: Patient states over the last few weeks he has felt his abdomen enlarging and bulging especially lower right abdomen that is painful especially with coughing, lifting, standing, moving  He says the pain is better when he is not lifting  He states he is trying to quit smoking and only smokes a few cigarettes a day  No nuasea, vomiting, fevers  ROS:  General ROS: negative for - chills, fatigue, fever or night sweats, weight loss  Respiratory ROS: no cough, shortness of breath, or wheezing  Cardiovascular ROS: no chest pain or dyspnea on exertion  Genito-Urinary ROS: no dysuria, trouble voiding, or hematuria  Musculoskeletal ROS: negative for - gait disturbance, joint pain or muscle pain  Neurological ROS: no TIA or stroke symptoms  GI ROS: see HPI  Skin ROS: no new rashes or lesions   Lymphatic ROS: no new adenopathy noted by pt  GYN ROS: see HPI, no new GYN history or bleeding noted  Psy ROS: no new mental or behavioral disturbances         Patient Active Problem List   Diagnosis   • Asthma   • Hepatitis C   • COPD (chronic obstructive pulmonary disease) ([facility])       Allergies:  Bactrim [sulfamethoxazole-trimethoprim], Ciprofloxacin, Dust mite extract, and Sulfa antibiotics    No current outpatient medications on file      Past Medical History:   Diagnosis Date   • Asthma    • Bronchitis    • COPD (chronic obstructive pulmonary disease) ([facility] [address] )    • COVID 10/2021   • Hepatitis C    • Infectious viral hepatitis    • Liver disease    • Pneumonia    • Tinnitus        Past Surgical History:   Procedure Laterality Date   • COLONOSCOPY W/ POLYPECTOMY  11/22/2022    6 MONTHS = MUST   • COLOSTOMY N/A 09/26/2022    Procedure: COLOSTOMY END;  Surgeon: Jaret Mckeon MD;  [facility];  Service: General   • COLOSTOMY N/A 09/26/2022    Procedure: COLOSTOMY LOOP;  Surgeon: Jaret Mckeon MD;  [facility];  Service: General   • KNEE ARTHROSCOPY Left    • LAPAROTOMY N/A 09/26/2022    Procedure: LAPAROTOMY EXPLORATORY, LYSIS OF ADHESIONS, LOW ANTERIOR RESECTION WITH PRIMARY ANASTOMOSIS;  Surgeon: Reynaldo Alberto MD;  [facility];  Service: General   • MOUTH SURGERY     • SC CLOSURE ENTEROSTOMY LG/SMALL INTESTINE N/A 11/23/2022    Procedure: CLOSURE COLOSTOMY, LYSIS OF ADEHSIONS, RIGID SIGMOIDOSCOPY;  Surgeon: Reynaldo Alberto MD;  [facility];  Service: General   • SC COLECTOMY PARTIAL W/ANASTOMOSIS N/A 09/26/2022    Procedure: RESECTION COLON SIGMOID;  Surgeon: Reynaldo Alberto MD;  [facility];  Service: General   • SC [address] RGD DX W/WO COLLJ [address] [facility] [address] [facility] N/A 09/26/2022    Procedure: SIGMOIDOSCOPY RIGID;  Surgeon: Reynaldo Alberto MD;  [facility];  Service: General       History reviewed  No pertinent family history  reports that he has been smoking cigarettes  He has been smoking an average of  25 packs per day  He has never used smokeless tobacco  He reports that he does not currently use alcohol  He reports that he does not currently use drugs after having used the following drugs: Marijuana, Heroin, Methamphetamines, and Fentanyl  Vitals:    03/21/23 1030   BP: 130/70   Pulse: 72   Temp: (!) 95 4 °F (35 2 °C)       PHYSICAL EXAM  General: normal, cooperative, no distress    Lungs: normal bilaterally  Heart sounds normal S1 and S2  Abdominal: soft, nondistended or non-tender - There is bulging of this lower abdomen especially left lower side - reducible - fascial edges are hard to appreciate  Incision: clean, dry, and intact and healing well; patient does have erythema around old abdominal surgical site for his original ileostomy surgery as well as his ileostomy reversal surgery from 11/23/22  No significant TTP  No fluctuant mass or purulent discharge appreciated  No other abdominal TTP        Jarrett Ornelas PA-C  Date: 3/21/2023 Time: 10:39 AM

## 2023-03-15 ENCOUNTER — TELEPHONE (OUTPATIENT)
Dept: SURGERY | Facility: CLINIC | Age: 58
End: 2023-03-15

## 2023-03-15 NOTE — TELEPHONE ENCOUNTER
Completed on-line request through [facility] to initiate prior authorization for upcoming surgery

## 2023-03-21 ENCOUNTER — OFFICE VISIT (OUTPATIENT)
Dept: SURGERY | Facility: CLINIC | Age: 58
End: 2023-03-21

## 2023-03-21 VITALS
HEIGHT: 71 IN | TEMPERATURE: 95.4 F | SYSTOLIC BLOOD PRESSURE: 130 MMHG | DIASTOLIC BLOOD PRESSURE: 70 MMHG | HEART RATE: 72 BPM | WEIGHT: 186.4 LBS | BODY MASS INDEX: 26.1 KG/M2

## 2023-03-21 DIAGNOSIS — K43.2 INCISIONAL HERNIA, WITHOUT OBSTRUCTION OR GANGRENE: Primary | ICD-10-CM

## 2023-03-22 ENCOUNTER — TELEPHONE (OUTPATIENT)
Dept: OTHER | Facility: HOSPITAL | Age: 58
End: 2023-03-22

## 2023-03-22 ENCOUNTER — APPOINTMENT (OUTPATIENT)
Dept: RADIOLOGY | Facility: HOSPITAL | Age: 58
End: 2023-03-22

## 2023-03-22 ENCOUNTER — HOSPITAL ENCOUNTER (INPATIENT)
Facility: HOSPITAL | Age: 58
LOS: 1 days | Discharge: HOME HEALTH | End: 2023-03-23
Attending: SURGERY | Admitting: SURGERY

## 2023-03-22 DIAGNOSIS — S37.20XA INJURY OF BLADDER, INITIAL ENCOUNTER: ICD-10-CM

## 2023-03-22 DIAGNOSIS — N99.72 BLADDER PERFORATION, INTRAOPERATIVE: ICD-10-CM

## 2023-03-22 DIAGNOSIS — K43.2 INCISIONAL HERNIA, WITHOUT OBSTRUCTION OR GANGRENE: Primary | ICD-10-CM

## 2023-03-22 PROCEDURE — 0TQB4ZZ REPAIR BLADDER, PERCUTANEOUS ENDOSCOPIC APPROACH: ICD-10-PCS | Performed by: SURGERY

## 2023-03-22 PROCEDURE — 8E0W4CZ ROBOTIC ASSISTED PROCEDURE OF TRUNK REGION, PERCUTANEOUS ENDOSCOPIC APPROACH: ICD-10-PCS | Performed by: SURGERY

## 2023-03-22 PROCEDURE — 0WUF4JZ SUPPLEMENT ABDOMINAL WALL WITH SYNTHETIC SUBSTITUTE, PERCUTANEOUS ENDOSCOPIC APPROACH: ICD-10-PCS | Performed by: SURGERY

## 2023-03-22 DEVICE — VENTRALIGHT ST MESH WITH ECHO 2 POSITIONING SYSTEM 10 CM X 15 CM (4" X  6") ELLIPSE
Type: IMPLANTABLE DEVICE | Site: ABDOMEN | Status: FUNCTIONAL
Brand: VENTRALIGHT ST MESH WITH ECHO 2 POSITIONING SYSTEM

## 2023-03-22 RX ORDER — KETAMINE HCL IN NACL, ISO-OSM 100MG/10ML
SYRINGE (ML) INJECTION AS NEEDED
Status: DISCONTINUED | OUTPATIENT
Start: 2023-03-22 | End: 2023-03-22

## 2023-03-22 RX ORDER — KETOROLAC TROMETHAMINE 30 MG/ML
INJECTION, SOLUTION INTRAMUSCULAR; INTRAVENOUS AS NEEDED
Status: DISCONTINUED | OUTPATIENT
Start: 2023-03-22 | End: 2023-03-22

## 2023-03-22 RX ORDER — MIDAZOLAM HYDROCHLORIDE 2 MG/2ML
INJECTION, SOLUTION INTRAMUSCULAR; INTRAVENOUS AS NEEDED
Status: DISCONTINUED | OUTPATIENT
Start: 2023-03-22 | End: 2023-03-22

## 2023-03-22 RX ORDER — DEXMEDETOMIDINE HYDROCHLORIDE 100 UG/ML
INJECTION, SOLUTION INTRAVENOUS AS NEEDED
Status: DISCONTINUED | OUTPATIENT
Start: 2023-03-22 | End: 2023-03-22

## 2023-03-22 RX ORDER — HYDROMORPHONE HCL/PF 1 MG/ML
0.5 SYRINGE (ML) INJECTION
Status: DISCONTINUED | OUTPATIENT
Start: 2023-03-22 | End: 2023-03-22 | Stop reason: HOSPADM

## 2023-03-22 RX ORDER — ALBUTEROL SULFATE 90 UG/1
2 AEROSOL, METERED RESPIRATORY (INHALATION) EVERY 6 HOURS PRN
COMMUNITY

## 2023-03-22 RX ORDER — SODIUM CHLORIDE, SODIUM LACTATE, POTASSIUM CHLORIDE, CALCIUM CHLORIDE 600; 310; 30; 20 MG/100ML; MG/100ML; MG/100ML; MG/100ML
INJECTION, SOLUTION INTRAVENOUS CONTINUOUS PRN
Status: DISCONTINUED | OUTPATIENT
Start: 2023-03-22 | End: 2023-03-22

## 2023-03-22 RX ORDER — MAGNESIUM HYDROXIDE 1200 MG/15ML
LIQUID ORAL AS NEEDED
Status: DISCONTINUED | OUTPATIENT
Start: 2023-03-22 | End: 2023-03-22 | Stop reason: HOSPADM

## 2023-03-22 RX ORDER — HEPARIN SODIUM 5000 [USP'U]/ML
5000 INJECTION, SOLUTION INTRAVENOUS; SUBCUTANEOUS EVERY 8 HOURS SCHEDULED
Status: DISCONTINUED | OUTPATIENT
Start: 2023-03-22 | End: 2023-03-23 | Stop reason: HOSPADM

## 2023-03-22 RX ORDER — PROMETHAZINE HYDROCHLORIDE 25 MG/ML
12.5 INJECTION, SOLUTION INTRAMUSCULAR; INTRAVENOUS ONCE
Status: DISCONTINUED | OUTPATIENT
Start: 2023-03-22 | End: 2023-03-22 | Stop reason: HOSPADM

## 2023-03-22 RX ORDER — ALBUTEROL SULFATE 2.5 MG/3ML
2.5 SOLUTION RESPIRATORY (INHALATION) ONCE AS NEEDED
Status: DISCONTINUED | OUTPATIENT
Start: 2023-03-22 | End: 2023-03-22 | Stop reason: HOSPADM

## 2023-03-22 RX ORDER — FENTANYL CITRATE 50 UG/ML
INJECTION, SOLUTION INTRAMUSCULAR; INTRAVENOUS AS NEEDED
Status: DISCONTINUED | OUTPATIENT
Start: 2023-03-22 | End: 2023-03-22

## 2023-03-22 RX ORDER — NALOXONE HYDROCHLORIDE 0.4 MG/ML
0.1 INJECTION, SOLUTION INTRAMUSCULAR; INTRAVENOUS; SUBCUTANEOUS
Status: DISCONTINUED | OUTPATIENT
Start: 2023-03-22 | End: 2023-03-23 | Stop reason: HOSPADM

## 2023-03-22 RX ORDER — ACETAMINOPHEN 325 MG/1
975 TABLET ORAL ONCE
Status: COMPLETED | OUTPATIENT
Start: 2023-03-22 | End: 2023-03-22

## 2023-03-22 RX ORDER — FENTANYL CITRATE/PF 50 MCG/ML
25 SYRINGE (ML) INJECTION
Status: DISCONTINUED | OUTPATIENT
Start: 2023-03-22 | End: 2023-03-22 | Stop reason: HOSPADM

## 2023-03-22 RX ORDER — HYDROMORPHONE HCL 110MG/55ML
PATIENT CONTROLLED ANALGESIA SYRINGE INTRAVENOUS AS NEEDED
Status: DISCONTINUED | OUTPATIENT
Start: 2023-03-22 | End: 2023-03-22

## 2023-03-22 RX ORDER — PROPOFOL 10 MG/ML
INJECTION, EMULSION INTRAVENOUS AS NEEDED
Status: DISCONTINUED | OUTPATIENT
Start: 2023-03-22 | End: 2023-03-22

## 2023-03-22 RX ORDER — ROCURONIUM BROMIDE 10 MG/ML
INJECTION, SOLUTION INTRAVENOUS AS NEEDED
Status: DISCONTINUED | OUTPATIENT
Start: 2023-03-22 | End: 2023-03-22

## 2023-03-22 RX ORDER — ONDANSETRON 2 MG/ML
INJECTION INTRAMUSCULAR; INTRAVENOUS AS NEEDED
Status: DISCONTINUED | OUTPATIENT
Start: 2023-03-22 | End: 2023-03-22

## 2023-03-22 RX ORDER — ACETAMINOPHEN 325 MG/1
650 TABLET ORAL EVERY 6 HOURS PRN
Status: DISCONTINUED | OUTPATIENT
Start: 2023-03-22 | End: 2023-03-23

## 2023-03-22 RX ORDER — LEVALBUTEROL 1.25 MG/.5ML
1.25 SOLUTION, CONCENTRATE RESPIRATORY (INHALATION) ONCE
Status: COMPLETED | OUTPATIENT
Start: 2023-03-22 | End: 2023-03-22

## 2023-03-22 RX ORDER — SODIUM CHLORIDE 9 MG/ML
125 INJECTION, SOLUTION INTRAVENOUS CONTINUOUS
Status: DISCONTINUED | OUTPATIENT
Start: 2023-03-22 | End: 2023-03-22

## 2023-03-22 RX ORDER — IPRATROPIUM BROMIDE AND ALBUTEROL SULFATE 2.5; .5 MG/3ML; MG/3ML
3 SOLUTION RESPIRATORY (INHALATION) EVERY 6 HOURS PRN
Status: DISCONTINUED | OUTPATIENT
Start: 2023-03-22 | End: 2023-03-23 | Stop reason: HOSPADM

## 2023-03-22 RX ORDER — DEXAMETHASONE SODIUM PHOSPHATE 10 MG/ML
INJECTION, SOLUTION INTRAMUSCULAR; INTRAVENOUS AS NEEDED
Status: DISCONTINUED | OUTPATIENT
Start: 2023-03-22 | End: 2023-03-22

## 2023-03-22 RX ORDER — EPHEDRINE SULFATE 50 MG/ML
INJECTION INTRAVENOUS AS NEEDED
Status: DISCONTINUED | OUTPATIENT
Start: 2023-03-22 | End: 2023-03-22

## 2023-03-22 RX ORDER — SODIUM CHLORIDE, SODIUM LACTATE, POTASSIUM CHLORIDE, CALCIUM CHLORIDE 600; 310; 30; 20 MG/100ML; MG/100ML; MG/100ML; MG/100ML
125 INJECTION, SOLUTION INTRAVENOUS CONTINUOUS
Status: DISCONTINUED | OUTPATIENT
Start: 2023-03-22 | End: 2023-03-23

## 2023-03-22 RX ORDER — ONDANSETRON 2 MG/ML
4 INJECTION INTRAMUSCULAR; INTRAVENOUS EVERY 6 HOURS PRN
Status: DISCONTINUED | OUTPATIENT
Start: 2023-03-22 | End: 2023-03-23 | Stop reason: HOSPADM

## 2023-03-22 RX ORDER — CEFAZOLIN SODIUM 2 G/50ML
2000 SOLUTION INTRAVENOUS ONCE
Status: COMPLETED | OUTPATIENT
Start: 2023-03-22 | End: 2023-03-22

## 2023-03-22 RX ORDER — LORAZEPAM 2 MG/ML
1 INJECTION INTRAMUSCULAR ONCE
Status: DISCONTINUED | OUTPATIENT
Start: 2023-03-22 | End: 2023-03-22 | Stop reason: HOSPADM

## 2023-03-22 RX ADMIN — ROCURONIUM BROMIDE 10 MG: 10 INJECTION, SOLUTION INTRAVENOUS at 13:27

## 2023-03-22 RX ADMIN — SODIUM CHLORIDE 125 ML/HR: 0.9 INJECTION, SOLUTION INTRAVENOUS at 10:38

## 2023-03-22 RX ADMIN — SODIUM CHLORIDE, SODIUM LACTATE, POTASSIUM CHLORIDE, AND CALCIUM CHLORIDE 125 ML/HR: .6; .31; .03; .02 INJECTION, SOLUTION INTRAVENOUS at 18:33

## 2023-03-22 RX ADMIN — Medication 10 MG: at 12:25

## 2023-03-22 RX ADMIN — CEFAZOLIN SODIUM 2000 MG: 2 SOLUTION INTRAVENOUS at 15:59

## 2023-03-22 RX ADMIN — SODIUM CHLORIDE, SODIUM LACTATE, POTASSIUM CHLORIDE, AND CALCIUM CHLORIDE: .6; .31; .03; .02 INJECTION, SOLUTION INTRAVENOUS at 15:46

## 2023-03-22 RX ADMIN — FENTANYL CITRATE 50 MCG: 50 INJECTION INTRAMUSCULAR; INTRAVENOUS at 13:32

## 2023-03-22 RX ADMIN — DEXAMETHASONE SODIUM PHOSPHATE 5 MG: 10 INJECTION INTRAMUSCULAR; INTRAVENOUS at 12:37

## 2023-03-22 RX ADMIN — MIDAZOLAM 2 MG: 1 INJECTION INTRAMUSCULAR; INTRAVENOUS at 12:18

## 2023-03-22 RX ADMIN — HYDROMORPHONE HYDROCHLORIDE 0.5 MG: 2 INJECTION INTRAMUSCULAR; INTRAVENOUS; SUBCUTANEOUS at 15:53

## 2023-03-22 RX ADMIN — SODIUM CHLORIDE, SODIUM LACTATE, POTASSIUM CHLORIDE, AND CALCIUM CHLORIDE: .6; .31; .03; .02 INJECTION, SOLUTION INTRAVENOUS at 13:35

## 2023-03-22 RX ADMIN — Medication 20 MG: at 12:28

## 2023-03-22 RX ADMIN — KETOROLAC TROMETHAMINE 30 MG: 30 INJECTION, SOLUTION INTRAMUSCULAR at 16:28

## 2023-03-22 RX ADMIN — ROCURONIUM BROMIDE 100 MG: 10 INJECTION, SOLUTION INTRAVENOUS at 12:28

## 2023-03-22 RX ADMIN — DEXMEDETOMIDINE HCL 8 MCG: 100 INJECTION INTRAVENOUS at 12:24

## 2023-03-22 RX ADMIN — HYDROMORPHONE HYDROCHLORIDE 1 MG: 2 INJECTION INTRAMUSCULAR; INTRAVENOUS; SUBCUTANEOUS at 12:52

## 2023-03-22 RX ADMIN — DEXMEDETOMIDINE HCL 8 MCG: 100 INJECTION INTRAVENOUS at 12:17

## 2023-03-22 RX ADMIN — SODIUM CHLORIDE, SODIUM LACTATE, POTASSIUM CHLORIDE, AND CALCIUM CHLORIDE: .6; .31; .03; .02 INJECTION, SOLUTION INTRAVENOUS at 12:34

## 2023-03-22 RX ADMIN — DEXMEDETOMIDINE HCL 12 MCG: 100 INJECTION INTRAVENOUS at 12:35

## 2023-03-22 RX ADMIN — LIDOCAINE HYDROCHLORIDE 100 MG: 20 INJECTION INTRAVENOUS at 12:28

## 2023-03-22 RX ADMIN — LEVALBUTEROL 1.25 MG: 1.25 SOLUTION, CONCENTRATE RESPIRATORY (INHALATION) at 18:03

## 2023-03-22 RX ADMIN — HEPARIN SODIUM 5000 UNITS: 5000 INJECTION INTRAVENOUS; SUBCUTANEOUS at 22:41

## 2023-03-22 RX ADMIN — ONDANSETRON 4 MG: 2 INJECTION INTRAMUSCULAR; INTRAVENOUS at 16:17

## 2023-03-22 RX ADMIN — FENTANYL CITRATE 25 MCG: 50 INJECTION INTRAMUSCULAR; INTRAVENOUS at 15:14

## 2023-03-22 RX ADMIN — FENTANYL CITRATE 25 MCG: 50 INJECTION INTRAMUSCULAR; INTRAVENOUS at 15:01

## 2023-03-22 RX ADMIN — Medication 10 MG: at 12:19

## 2023-03-22 RX ADMIN — PROPOFOL 50 MG: 10 INJECTION, EMULSION INTRAVENOUS at 16:38

## 2023-03-22 RX ADMIN — EPHEDRINE SULFATE 10 MG: 50 INJECTION, SOLUTION INTRAVENOUS at 12:40

## 2023-03-22 RX ADMIN — ONDANSETRON 4 MG: 2 INJECTION INTRAMUSCULAR; INTRAVENOUS at 12:37

## 2023-03-22 RX ADMIN — DEXMEDETOMIDINE HCL 8 MCG: 100 INJECTION INTRAVENOUS at 12:20

## 2023-03-22 RX ADMIN — PROPOFOL 50 MG: 10 INJECTION, EMULSION INTRAVENOUS at 16:35

## 2023-03-22 RX ADMIN — ROCURONIUM BROMIDE 10 MG: 10 INJECTION, SOLUTION INTRAVENOUS at 15:16

## 2023-03-22 RX ADMIN — SUGAMMADEX 200 MG: 100 INJECTION, SOLUTION INTRAVENOUS at 16:25

## 2023-03-22 RX ADMIN — ACETAMINOPHEN 325MG 975 MG: 325 TABLET ORAL at 10:58

## 2023-03-22 RX ADMIN — DEXMEDETOMIDINE HCL 12 MCG: 100 INJECTION INTRAVENOUS at 12:28

## 2023-03-22 RX ADMIN — Medication 10 MG: at 13:19

## 2023-03-22 RX ADMIN — CEFAZOLIN SODIUM 2000 MG: 2 SOLUTION INTRAVENOUS at 12:29

## 2023-03-22 RX ADMIN — ROCURONIUM BROMIDE 20 MG: 10 INJECTION, SOLUTION INTRAVENOUS at 14:09

## 2023-03-22 RX ADMIN — HYDROMORPHONE HYDROCHLORIDE 1 MG: 2 INJECTION INTRAMUSCULAR; INTRAVENOUS; SUBCUTANEOUS at 13:27

## 2023-03-22 RX ADMIN — PROPOFOL 150 MG: 10 INJECTION, EMULSION INTRAVENOUS at 12:28

## 2023-03-22 RX ADMIN — ROCURONIUM BROMIDE 10 MG: 10 INJECTION, SOLUTION INTRAVENOUS at 15:48

## 2023-03-22 RX ADMIN — DEXMEDETOMIDINE HCL 12 MCG: 100 INJECTION INTRAVENOUS at 12:32

## 2023-03-22 RX ADMIN — HYDROMORPHONE HYDROCHLORIDE 0.5 MG: 2 INJECTION INTRAMUSCULAR; INTRAVENOUS; SUBCUTANEOUS at 15:57

## 2023-03-22 RX ADMIN — HYDROMORPHONE HYDROCHLORIDE 1 MG: 2 INJECTION INTRAMUSCULAR; INTRAVENOUS; SUBCUTANEOUS at 12:59

## 2023-03-22 RX ADMIN — SODIUM CHLORIDE, SODIUM LACTATE, POTASSIUM CHLORIDE, AND CALCIUM CHLORIDE: .6; .31; .03; .02 INJECTION, SOLUTION INTRAVENOUS at 14:37

## 2023-03-22 NOTE — OP NOTE
REPAIR COMPLEX INCISIONAL HERNIA  LAPAROSCOPIC WITH MESH, ROBOTIC ASSISTED, AND BLADDER PERFERATION REPAIR  Postoperative Note  PATIENT NAME: Vanesa Campos  : 1965  MRN: [id number]  [facility] OR ROOM 08    Surgery Date: 3/22/2023    Consent:  The risks, benefits, and alternatives to the surgery were discussed with the patient and with the family prior to surgery, personally by Dr Sheryl Wu  If the consent was obtained by the physician assistant or other representative, the consent was reviewed once again personally by the operating physician  Common complications particular for this procedure as well as unusual complications were discussed, including but not limited to:  bleeding, wound infection, prolonged wound healing, open wounds, reoperation, leak from the bowel or viscus, leak from the bile duct or injury to adjacent or other organs or blood vessels in the abdomen  If the surgery was laparoscopic, it was discussed that possible open surgery could also occur during that same surgery and is always an option in laparoscopic surgery  A  was used if necessary  The patient expressed understanding of the issues discussed and wished and consented to the procedure to proceed  All questions were answered  Dr Sheryl Wu personally discussed the informed consent with this patient          Pre operative diagnosis:   Incisional hernia, without obstruction or gangrene [K43 2]    Operative Indications:  Symptomatic Ventral Hernia with incisional hernia and bladder involvement    Operative Findings:    Hernia final  fascia defect size:  (cm)  7 5      Post operative diagnosis:  Post-Op Diagnosis Codes:     * Incisional hernia, without obstruction or gangrene [K43 2]    Procedure:  Procedure(s):  REPAIR COMPLEX INCISIONAL HERNIA  LAPAROSCOPIC WITH MESH, ROBOTIC ASSISTED, AND BLADDER PERFERATION REPAIR    Surgeon(s) and Role:     * Reynaldo Alberto MD - Primary     * Jomar Hardin PA-C - Assisting     * Alyssa Rachna Christine, [address] first assistant     * Jason Winchester DO - Assisting resident learner    The first assistant/PA was medically necessary for surgical safety the case including suturing, retraction, and hemostasis  A qualified resident was not available to first assist    I provided direct and immediate supervision  I was present for the entire procedure  Drains:  Closed/Suction Drain RLQ 10 Fr  (Active)   Number of days: 0       Urethral Catheter 16 Fr  (Active)   Number of days: 0       Specimens:  * No specimens in log *    Estimated Blood Loss:   Minimal    Anesthesia Type:   Choice     Procedure: The patient was seen in the Holding Room  The risks, benefits, complications, treatment options, and expected outcomes were discussed with the patient  The possibilities of reaction to medication, pulmonary aspiration, perforation of viscus, bleeding, recurrent infection, the need for additional procedures, failure to diagnose a condition, and creating a complication requiring transfusion or operation were discussed with the patient  The patient concurred with the proposed plan, giving informed consent  The site of surgery properly noted/marked  The patient was taken to Operating Room, identified as [facility]  Staff confirmed patient name, , and procedure  A Time Out was held and the above information confirmed            Operative Findings:  Diastases in the lower abdomen of the midline, with some bladder incorporated into this around the pubic tubercle  After taking down the flap and sewing in the mesh, a small pinpoint hole was seen in the bladder and the preperitoneal space after instillation of methylene blue in the bladder  This was easily oversewn with figure-of-eight Vicryl suture and overlay second suture  Post repair methylene blue with 300 cc showed no evidence of leak      A preperitoneal drain was placed through a separate stab incision between the bladder and the mesh  Intraoperative curbside consult with urology was carried out by phone and this confirmed appropriate course of action          Procedure and Technique:     A incision was made at epigastric area under  direct vision used to enter the abdomen  Pneumoperitoneum created and the camera inserted  There was no intra-abdominal injury or bleeding  Using appropriate spacing techniques, a 8 mm port was placed in the right upper quadrant and left upper quadrant to face the lower midline  The robot was docked      There were no significant adhesions to the midline      An incision was made in the peritoneum approximately 8-10 cm away from the defect  This was along the peritoneum on the left lateral side from cephalad to caudad, dissecting a preperitoneal plane  This was then dissected to the right lateral side beyond the hernia sac taking care to make no holes into the peritoneum      This was started in the left side of the incision and carried to the midline  It was clear that the midline peritoneum would not be coming down with the flap so a open book technique was used, incising the peritoneum along the hernia and creating flaps along the right and left rectus muscles moving laterally on each side  When approaching the pubic tubercle, a flap was taken down to ensure the bladder was well below the pubic tubercle      The defect was closed using a #1  strata Fix PDS suture in a running fashion and overlapping this running suture  This allowed for full closure of the defect was overlapped using 2 separate sutures from top to bottom and bottom to top          The entire preperitoneal space was freed up for approximately to provide significant overlap for the hernia mesh  A 10 x 15 cm coated ST dual sided mesh was used to fit this space to at least 5 cm of overlap were encountered circumferentially    The probe was then placed with the rough side against the anterior abdominal wall       This mesh was sutured in place usin 0 V lock suture in a circumferential fashion x3      Methylene blue was instilled into the bladder to 300 cc  Upon examining the preperitoneal flap,  a small tiny pinhole extruded methylene blue  This was oversewn using 2-0 Vicryl suture and imbricated with an additional 2-0 Vicryl suture for 2 layer closure  Reinstillation of the methylene blue revealed no evidence of leak even with over 300 cc in the bladder      Intraoperative curbside consult was carried out with urology who felt this was an appropriate course of action      A separate 5 mm port was placed in the right lower quadrant for suction irrigation during this part of the procedure  A flat CHITRA drain was placed through this port site through a separate stab incision through the peritoneum into the preperitoneal space between the bladder and the mesh  This was secured          The peritoneum was then reapproximated using 2 0 V lock suture along the open book midline      There were no holes or defects in the peritoneum, upon closure of the peritoneum  The drain entered through the peritoneum in the right lower quadrant and stayed in the preperitoneal space  However the peritoneum was secured up to the anterior abdominal wall throughout its length         The pneumoperitoneum was released  The robot was undocked  All ports were removed under direct vision       All skin incisions were closed using 4 Monocryl subcuticular sutures  The induction of local anesthesia was also given  The drain was sutured in place using a nylon suture       A abdominal binder was placed         The patient tolerated the procedure well in excellent condition    The Saleem remained in place postoperatively per urology instructions          I was present for the entire procedure    Instrument, sponge, and needle counts were correct prior to closure and at the conclusion of the case          Some portions of this records may have been generated with voice recognition software  There may be translation, syntax,  or grammatical errors  Occasional wrong word or "sound-a-like" substitutions may have occurred due to the inherent limitations of the voice recognition software  Read the chart carefully and recognize, using context, where substations may have occurred   If you have any questions, please contact the dictating provider for clarification or correction, as needed       Complications: None     Condition: Stable to PACU     SIGNATURE: Cari Sanchez MD   DATE: March 22, 2023   TIME: 4:28 PM

## 2023-03-22 NOTE — INTERVAL H&P NOTE
H&P reviewed  After examining the patient I find no changes in the patients condition since the H&P had been written  Vitals:    03/22/23 1015   BP: 112/74   Pulse: 80   Resp: 16   Temp: 98 2 °F (36 8 °C)   SpO2: 93%       Patient is aware that we will try diagnostic laparoscopy, determine if this is possible robotically due to his body habitus, and repair open, laparoscopic or robotic depending on surgical findings  This is a complex incisional hernia  He understands and agrees to the plan

## 2023-03-22 NOTE — ANESTHESIA POSTPROCEDURE EVALUATION
Post-Op Assessment Note    CV Status:  Stable    Pain management: adequate     Mental Status:  Alert and awake   Hydration Status:  Euvolemic   PONV Controlled:  Controlled   Airway Patency:  Patent      Post Op Vitals Reviewed: Yes      Staff: Anesthesiologist         No notable events documented      /54 (03/22/23 1931)    Temp (!) 97 2 °F (36 2 °C) (03/22/23 1931)    Pulse 87 (03/22/23 1931)   Resp 20 (03/22/23 1931)    SpO2 95 % (03/22/23 1931)

## 2023-03-22 NOTE — QUICK NOTE
Saw patient in PACU and discussed operative findings and procedure  O2 sats are in the high 80s even with nebulizers  He looks comfortable  He admitted to smoking this morning  He understands that continued smoking will impair and his healing and likely result in a recurrence  We will admit overnight not only for Saleem management but for pulmonary issues  We will need respiratory therapy/respiratory toilet, possible medicine consult  Continue Saleem for 7 to 10 days and urology consult postop  Full disclosure with the patient      Signature:   Eulogio Granados MD  Date: 3/22/2023 Time: 5:52 PM

## 2023-03-22 NOTE — ANESTHESIA PREPROCEDURE EVALUATION
Procedure:  LAPAROSCOPY DIAGNOSTIC (Abdomen)  REPAIR HERNIA INCISIONAL (Abdomen)  REPAIR HERNIA INCISIONAL LAPAROSCOPIC W/ ROBOTICS (Abdomen)    Relevant Problems   ANESTHESIA (within normal limits)      GI/HEPATIC   (+) Hepatitis C      NEURO/PSYCH  polysubstance abuse      PULMONARY   (+) Asthma   (+) COPD (chronic obstructive pulmonary disease) (HCC)        Physical Exam    Airway    Mallampati score: II  TM Distance: >3 FB  Neck ROM: full     Dental   Comment: 8 teeth, all on bottom,     Cardiovascular  Rhythm: regular, Rate: normal, Cardiovascular exam normal    Pulmonary  Pulmonary exam normal Breath sounds clear to auscultation,     Other Findings        Anesthesia Plan  ASA Score- 3     Anesthesia Type- general with ASA Monitors  Additional Monitors:   Airway Plan: ETT  Comment: History of substance use, clean since 12/2020, substances of misuse were marijuana, methamphetamines, heroin and fentanyl          Plan Factors-    Chart reviewed  EKG reviewed  Existing labs reviewed  Patient summary reviewed  Patient is a current smoker  Patient instructed to abstain from smoking on day of procedure  Patient smoked on day of surgery  Obstructive sleep apnea risk education given perioperatively  Induction- intravenous  Postoperative Plan- Plan for postoperative opioid use  Planned trial extubation    Informed Consent- Anesthetic plan and risks discussed with patient

## 2023-03-23 ENCOUNTER — TELEPHONE (OUTPATIENT)
Dept: SURGERY | Facility: CLINIC | Age: 58
End: 2023-03-23

## 2023-03-23 VITALS
OXYGEN SATURATION: 90 % | WEIGHT: 185.63 LBS | HEIGHT: 71 IN | BODY MASS INDEX: 25.99 KG/M2 | HEART RATE: 83 BPM | RESPIRATION RATE: 20 BRPM | DIASTOLIC BLOOD PRESSURE: 76 MMHG | SYSTOLIC BLOOD PRESSURE: 117 MMHG | TEMPERATURE: 98.1 F

## 2023-03-23 PROBLEM — N99.72 BLADDER PERFORATION, INTRAOPERATIVE: Status: ACTIVE | Noted: 2023-03-23

## 2023-03-23 LAB
ALBUMIN SERPL BCP-MCNC: 3.5 G/DL (ref 3.5–5)
ALP SERPL-CCNC: 33 U/L (ref 34–104)
ALT SERPL W P-5'-P-CCNC: 12 U/L (ref 7–52)
ANION GAP SERPL CALCULATED.3IONS-SCNC: 5 MMOL/L (ref 4–13)
AST SERPL W P-5'-P-CCNC: 18 U/L (ref 13–39)
BASOPHILS # BLD AUTO: 0.02 THOUSANDS/ÂΜL (ref 0–0.1)
BASOPHILS NFR BLD AUTO: 0 % (ref 0–1)
BILIRUB SERPL-MCNC: 0.91 MG/DL (ref 0.2–1)
BUN SERPL-MCNC: 11 MG/DL (ref 5–25)
CALCIUM SERPL-MCNC: 8.5 MG/DL (ref 8.4–10.2)
CHLORIDE SERPL-SCNC: 108 MMOL/L (ref 96–108)
CO2 SERPL-SCNC: 26 MMOL/L (ref 21–32)
CREAT SERPL-MCNC: 0.64 MG/DL (ref 0.6–1.3)
EOSINOPHIL # BLD AUTO: 0.02 THOUSAND/ÂΜL (ref 0–0.61)
EOSINOPHIL NFR BLD AUTO: 0 % (ref 0–6)
ERYTHROCYTE [DISTWIDTH] IN BLOOD BY AUTOMATED COUNT: 11.6 % (ref 11.6–15.1)
GFR SERPL CREATININE-BSD FRML MDRD: 109 ML/MIN/1.73SQ M
GLUCOSE SERPL-MCNC: 95 MG/DL (ref 65–140)
HCT VFR BLD AUTO: 38.7 % (ref 36.5–49.3)
HGB BLD-MCNC: 13.3 G/DL (ref 12–17)
IMM GRANULOCYTES # BLD AUTO: 0.03 THOUSAND/UL (ref 0–0.2)
IMM GRANULOCYTES NFR BLD AUTO: 0 % (ref 0–2)
LYMPHOCYTES # BLD AUTO: 1.52 THOUSANDS/ÂΜL (ref 0.6–4.47)
LYMPHOCYTES NFR BLD AUTO: 16 % (ref 14–44)
MAGNESIUM SERPL-MCNC: 1.6 MG/DL (ref 1.9–2.7)
MCH RBC QN AUTO: 31.8 PG (ref 26.8–34.3)
MCHC RBC AUTO-ENTMCNC: 34.4 G/DL (ref 31.4–37.4)
MCV RBC AUTO: 93 FL (ref 82–98)
MONOCYTES # BLD AUTO: 0.78 THOUSAND/ÂΜL (ref 0.17–1.22)
MONOCYTES NFR BLD AUTO: 8 % (ref 4–12)
NEUTROPHILS # BLD AUTO: 6.89 THOUSANDS/ÂΜL (ref 1.85–7.62)
NEUTS SEG NFR BLD AUTO: 76 % (ref 43–75)
NRBC BLD AUTO-RTO: 0 /100 WBCS
PHOSPHATE SERPL-MCNC: 2.7 MG/DL (ref 2.7–4.5)
PLATELET # BLD AUTO: 156 THOUSANDS/UL (ref 149–390)
PMV BLD AUTO: 10.8 FL (ref 8.9–12.7)
POTASSIUM SERPL-SCNC: 4.1 MMOL/L (ref 3.5–5.3)
PROT SERPL-MCNC: 5.7 G/DL (ref 6.4–8.4)
RBC # BLD AUTO: 4.18 MILLION/UL (ref 3.88–5.62)
SODIUM SERPL-SCNC: 139 MMOL/L (ref 135–147)
WBC # BLD AUTO: 9.26 THOUSAND/UL (ref 4.31–10.16)

## 2023-03-23 RX ORDER — ACETAMINOPHEN 325 MG/1
975 TABLET ORAL EVERY 8 HOURS SCHEDULED
Refills: 0
Start: 2023-03-23

## 2023-03-23 RX ORDER — KETOROLAC TROMETHAMINE 30 MG/ML
15 INJECTION, SOLUTION INTRAMUSCULAR; INTRAVENOUS EVERY 6 HOURS PRN
Status: DISCONTINUED | OUTPATIENT
Start: 2023-03-23 | End: 2023-03-23 | Stop reason: HOSPADM

## 2023-03-23 RX ORDER — ACETAMINOPHEN 325 MG/1
975 TABLET ORAL EVERY 8 HOURS SCHEDULED
Status: DISCONTINUED | OUTPATIENT
Start: 2023-03-23 | End: 2023-03-23 | Stop reason: HOSPADM

## 2023-03-23 RX ORDER — MAGNESIUM SULFATE HEPTAHYDRATE 40 MG/ML
2 INJECTION, SOLUTION INTRAVENOUS ONCE
Status: COMPLETED | OUTPATIENT
Start: 2023-03-23 | End: 2023-03-23

## 2023-03-23 RX ORDER — OXYBUTYNIN CHLORIDE 10 MG/1
10 TABLET, EXTENDED RELEASE ORAL DAILY PRN
Qty: 12 TABLET | Refills: 0 | Status: SHIPPED | OUTPATIENT
Start: 2023-03-23

## 2023-03-23 RX ORDER — METHOCARBAMOL 750 MG/1
750 TABLET, FILM COATED ORAL EVERY 6 HOURS SCHEDULED
Qty: 28 TABLET | Refills: 0 | Status: SHIPPED | OUTPATIENT
Start: 2023-03-23 | End: 2023-03-30

## 2023-03-23 RX ORDER — METHOCARBAMOL 750 MG/1
750 TABLET, FILM COATED ORAL EVERY 6 HOURS SCHEDULED
Status: DISCONTINUED | OUTPATIENT
Start: 2023-03-23 | End: 2023-03-23 | Stop reason: HOSPADM

## 2023-03-23 RX ORDER — IBUPROFEN 200 MG
600 TABLET ORAL EVERY 6 HOURS PRN
Refills: 0
Start: 2023-03-23

## 2023-03-23 RX ADMIN — METHOCARBAMOL 750 MG: 750 TABLET ORAL at 13:07

## 2023-03-23 RX ADMIN — METHOCARBAMOL 750 MG: 750 TABLET ORAL at 07:57

## 2023-03-23 RX ADMIN — MAGNESIUM SULFATE HEPTAHYDRATE 2 G: 40 INJECTION, SOLUTION INTRAVENOUS at 07:57

## 2023-03-23 RX ADMIN — HEPARIN SODIUM 5000 UNITS: 5000 INJECTION INTRAVENOUS; SUBCUTANEOUS at 06:15

## 2023-03-23 RX ADMIN — ACETAMINOPHEN 325MG 975 MG: 325 TABLET ORAL at 07:57

## 2023-03-23 RX ADMIN — POTASSIUM & SODIUM PHOSPHATES POWDER PACK 280-160-250 MG 2 PACKET: 280-160-250 PACK at 13:07

## 2023-03-23 NOTE — PROGRESS NOTES
General Surgery  Progress Note   Amol Aldridge 65 y o  male MRN: [id number]  Unit/Bed#: E2 -01 Encounter: [id number]    Assessment:  59-year-old male status post robotic ventral hernia repair C/B bladder injury primarily repaired with intraoperative urology consult     Afebrile, VSS  WBC: 9; Hb 3    UOP: 2 6   CHITRA: 70 cc serosanguineous    Plan:  - Advance to regular diet  - CHITRA drain in place; monitor output and character  - Saleem in place; appreciate urology recommendations  - Pain and nausea control as needed  - DVT prophylaxis  - Encourage ambulation    Subjective/Objective     Subjective: Patient seen and examined at bedside, in no acute distress  No acute events overnight  Patient's pain is well controlled  Pt denies nausea or vomiting  Denies bowel function  Saleem in place with clear yellow urine  Objective:     Vitals:Blood pressure 139/83, pulse 81, temperature 98 1 °F (36 7 °C), temperature source Temporal, resp  rate 20, height 5' 11" (1 803 m), weight 84 2 kg (185 lb 10 oz), SpO2 97 %  ,Body mass index is 25 89 kg/m²  Temp (24hrs), Av 9 °F (36 6 °C), Min:97 1 °F (36 2 °C), Max:99 3 °F (37 4 °C)  Current: Temperature: 98 1 °F (36 7 °C)      Intake/Output Summary (Last 24 hours) at 3/23/2023 0729  Last data filed at 3/23/2023 0600  Gross per 24 hour   Intake 5011 25 ml   Output 2690 ml   Net 2321 25 ml       Invasive Devices     Peripheral Intravenous Line  Duration           Peripheral IV 23 Dorsal (posterior); Left Forearm <1 day          Drain  Duration           Closed/Suction Drain RLQ 10 Fr  <1 day    Urethral Catheter 16 Fr  <1 day                Physical Exam:  General: No acute distress, alert and oriented  CV: Well perfused, regular rate and rhythm  Lungs: Normal work of breathing, no increased respiratory effort  Abdomen: Soft, appropriately tender, nondistended; abdominal binder in place, once removed surgical dressings appeared clean without saturation, CHITRA in place as above  Extremities: No edema, clubbing or cyanosis  Skin: Warm, dry    Lab Results: Results: I have personally reviewed all pertinent laboratory/tests results  VTE Prophylaxis: Sequential compression device (Venodyne)  and Enoxaparin (Lovenox)    Catrina Hdez MD  3/23/2023

## 2023-03-23 NOTE — PLAN OF CARE
Problem: INFECTION - ADULT  Goal: Absence or prevention of progression during hospitalization  Description: INTERVENTIONS:  - Assess and monitor for signs and symptoms of infection  - Monitor lab/diagnostic results  - Monitor all insertion sites, i e  indwelling lines, tubes, and drains  - Monitor endotracheal if appropriate and nasal secretions for changes in amount and color  - [place] appropriate cooling/warming therapies per order  - Administer medications as ordered  - Instruct and encourage patient and family to use good hand hygiene technique  - Identify and instruct in appropriate isolation precautions for identified infection/condition  Outcome: Progressing     Problem: GASTROINTESTINAL - ADULT  Goal: Maintains or returns to baseline bowel function  Description: INTERVENTIONS:  - Assess bowel function  - Encourage oral fluids to ensure adequate hydration  - Administer IV fluids if ordered to ensure adequate hydration  - Administer ordered medications as needed  - Encourage mobilization and activity  - Consider nutritional services referral to assist patient with adequate nutrition and appropriate food choices  Outcome: Progressing     Problem: GENITOURINARY - ADULT  Goal: Maintains or returns to baseline urinary function  Description: INTERVENTIONS:  - Assess urinary function  - Encourage oral fluids to ensure adequate hydration if ordered  - Administer IV fluids as ordered to ensure adequate hydration  - Administer ordered medications as needed  - Offer frequent toileting  - Follow urinary retention protocol if ordered  Outcome: Progressing     Problem: SKIN/TISSUE INTEGRITY - ADULT  Goal: Incision(s), wounds(s) or drain site(s) healing without S/S of infection  Description: INTERVENTIONS  - Assess and document dressing, incision, wound bed, drain sites and surrounding tissue  - Provide patient and family education  Outcome: Progressing     Problem: GENITOURINARY - ADULT  Goal: Urinary catheter remains patent  Description: INTERVENTIONS:  - Assess patency of urinary catheter  - If patient has a chronic montejo, consider changing catheter if non-functioning  - Follow guidelines for intermittent irrigation of non-functioning urinary catheter  Outcome: Progressing     Problem: GENITOURINARY - ADULT  Goal: Maintains or returns to baseline urinary function  Description: INTERVENTIONS:  - Assess urinary function  - Encourage oral fluids to ensure adequate hydration if ordered  - Administer IV fluids as ordered to ensure adequate hydration  - Administer ordered medications as needed  - Offer frequent toileting  - Follow urinary retention protocol if ordered  Outcome: Progressing

## 2023-03-23 NOTE — TELEPHONE ENCOUNTER
Farideh Mercado is a 71-year-old male with intraoperative bladder injury during general surgery, closed by Dr Phuong Mackay  Patient will require cystogram at 2 weeks and visit to determine if bladder has sealed off for trial of void  Please contact patient/caregiver with office visit between 2-3 weeks  Cystogram must be obtained prior  Already ordered  Thank you

## 2023-03-23 NOTE — ASSESSMENT & PLAN NOTE
· Surgically repaired by Dr Fransisca Weber during patient's hernia repair procedure  · Montejo catheter placed  · Currently patent with clear yellow output  · Patient to maintain montejo catheter x 2 weeks with cystogram prior  · Reviewed nature of cystogram and plan with patient  Also reviewed montejo catheter care as well  He is agreeable  · Oxybutynin XL 10 mg po daily prn sent to pharmacy for bladder spasms secondary to indwelling montejo catheter  · Patient is aware to discontinue this medication 24-48 hours prior to montejo catheter removal    · Our office will contact patient once discharged to schedule his follow up appt  · Clear for discharge from Urology standpoint  · Urology will sign off but remain available for any further inpatient needs  Please feel free to contact the provider currently covering the Urology [facility] role for this campus with questions or concerns

## 2023-03-23 NOTE — TELEPHONE ENCOUNTER
Attempted to contact [facility] to check on status of prior auth  They were unable to provide me with any information over the phone  Unable to log in at this time  Account is locked (AB)  Generated/created new account  Need to wait for account to be activated  Received in mail request for more records   Immediately faxed to try to get hernia surgery approval

## 2023-03-23 NOTE — PLAN OF CARE
Problem: PAIN - ADULT  Goal: Verbalizes/displays adequate comfort level or baseline comfort level  Description: Interventions:  - Encourage patient to monitor pain and request assistance  - Assess pain using appropriate pain scale  - Administer analgesics based on type and severity of pain and evaluate response  - Implement non-pharmacological measures as appropriate and evaluate response  - Consider cultural and social influences on pain and pain management  - Notify physician/advanced practitioner if interventions unsuccessful or patient reports new pain  Outcome: Progressing     Problem: INFECTION - ADULT  Goal: Absence or prevention of progression during hospitalization  Description: INTERVENTIONS:  - Assess and monitor for signs and symptoms of infection  - Monitor lab/diagnostic results  - Monitor all insertion sites, i e  indwelling lines, tubes, and drains  - Monitor endotracheal if appropriate and nasal secretions for changes in amount and color  - [place] appropriate cooling/warming therapies per order  - Administer medications as ordered  - Instruct and encourage patient and family to use good hand hygiene technique  - Identify and instruct in appropriate isolation precautions for identified infection/condition  Outcome: Progressing     Problem: DISCHARGE PLANNING  Goal: Discharge to home or other facility with appropriate resources  Description: INTERVENTIONS:  - Identify barriers to discharge w/patient and caregiver  - Arrange for needed discharge resources and transportation as appropriate  - Identify discharge learning needs (meds, montejo care, drain care)  - Refer to Case Management Department for coordinating discharge planning if the patient needs post-hospital services based on physician/advanced practitioner order or complex needs related to functional status, cognitive ability, or social support system  Outcome: Progressing     Problem: Knowledge Deficit  Goal: Patient/family/caregiver demonstrates understanding of disease process, treatment plan, medications, and discharge instructions  Description: Complete learning assessment and assess knowledge base  Interventions:  - Provide teaching at level of understanding  - Provide teaching via preferred learning methods  Outcome: Progressing     Problem: SAFETY ADULT  Goal: Patient will remain free of falls  Description: INTERVENTIONS:  - Educate patient/family on patient safety including physical limitations  - Instruct patient to call for assistance with activity   - Consult OT/PT to assist with strengthening/mobility   - Keep Call bell within reach  - Keep bed low and locked with side rails adjusted as appropriate  - Keep care items and personal belongings within reach  - Initiate and maintain comfort rounds  - Make Fall Risk Sign visible to staff  - Offer Toileting every 2 Hours, in advance of need  - Initiate/Maintain Bed alarm  - Apply yellow socks and bracelet for high fall risk patients  - Consider moving patient to room near nurses station  Outcome: Progressing     Problem: GASTROINTESTINAL - ADULT  Goal: Maintains or returns to baseline bowel function  Description: INTERVENTIONS:  - Assess bowel function  - Encourage oral fluids to ensure adequate hydration  - Administer IV fluids if ordered to ensure adequate hydration  - Administer ordered medications as needed  - Encourage mobilization and activity  - Consider nutritional services referral to assist patient with adequate nutrition and appropriate food choices  Outcome: Progressing     Problem: GENITOURINARY - ADULT  Goal: Urinary catheter remains patent  Description: INTERVENTIONS:  - Assess patency of urinary catheter  - Saleem catheter to remain in for 7-10 days    Follow-up with urology outpt  - Follow guidelines for intermittent irrigation of non-functioning urinary catheter  Outcome: Progressing

## 2023-03-23 NOTE — CASE MANAGEMENT
Case Management Discharge Planning Note    Patient name Sukhwinder Madrid  Location [facility] 2 /E2 -* MRN [id number]  : 1965 Date 3/23/2023       Current Admission Date: 3/22/2023  Current Admission Diagnosis:Incisional hernia  Patient Active Problem List    Diagnosis Date Noted   • Incisional hernia 2023   • Asthma    • Hepatitis C    • COPD (chronic obstructive pulmonary disease) ([facility] )       LOS (days): 1  Geometric Mean LOS (GMLOS) (days):   Days to GMLOS:     OBJECTIVE:  Risk of Unplanned Readmission Score: 7 87         Current admission status: Inpatient   Preferred Pharmacy:   [address] [place] [address] [facility]  Phone: [phone] Fax: [phone]    Primary Care Provider: Ashlyn Pryor MD    Primary Insurance: [employer] [place]  Secondary Insurance:     DISCHARGE DETAILS:    Discharge planning discussed with[de-identified] Patient  Freedom of Choice: Yes  Comments - Freedom of Choice: Agreeable to home health care for SN for CHITRA drain care  [facility] decided on    CM contacted family/caregiver?: No- see comments (declined need)  Were Treatment Team discharge recommendations reviewed with patient/caregiver?: Yes  Did patient/caregiver verbalize understanding of patient care needs?: Yes  Were patient/caregiver advised of the risks associated with not following Treatment Team discharge recommendations?: Yes         [facility]         Is the patient interested in [facility] at discharge?: Yes  [address] requested[de-identified] [address] Name[de-identified] [facility] Provider[de-identified] PCP  Home Health Services Needed[de-identified] Other (comment) (CHITRA drain)  Homebound Criteria Met[de-identified] Requires the Assistance of Another Person for Safe Ambulation or to Leave the Home  Supporting Clincal Findings[de-identified] Limited Endurance, Fatigues Easliy in [employer]    DME Referral Provided  Referral made for DME?: No    Other Referral/Resources/Interventions Provided:  Financial Resources Provided: Indigent Transportation  Interventions: [facility]  Referral Comments: Referrals placed via aidin         Treatment Team Recommendation: Home with [employer]  Discharge Destination Plan[de-identified] Home with [employer]  Transport at Discharge : Ride Share                                      Additional Comments: CM met with pt at the bedside  Pt reports that he does not have anyone that can provide him with transportation home today  CM reviewed lyft policy and pt is agreeable to a lyft  Pt also agreeable to [facility]  Referrals placed via aidin  Reviewed choice list  Pt agreeable to using [facility] for SN care   CM provided pt with SNAP application per his request

## 2023-03-23 NOTE — PHYSICAL THERAPY NOTE
Physical Therapy Evaluation    Performed at least 2 patient identifiers during session:  Patient Active Problem List   Diagnosis    Asthma    Hepatitis C    COPD (chronic obstructive pulmonary disease) ([facility] )    Incisional hernia    Bladder perforation, intraoperative       Past Medical History:   Diagnosis Date    Asthma     Bronchitis     COPD (chronic obstructive pulmonary disease) ([facility] )     COVID 10/2021    Hepatitis C     Infectious viral hepatitis     Liver disease     Pneumonia     Tinnitus        Past Surgical History:   Procedure Laterality Date    COLONOSCOPY W/ POLYPECTOMY  11/22/2022    6 MONTHS = MUST    COLOSTOMY N/A 09/26/2022    Procedure: COLOSTOMY END;  Surgeon: Ashly Alexandra MD;  [facility];  Service: General    COLOSTOMY N/A 09/26/2022    Procedure: COLOSTOMY LOOP;  Surgeon: Ashly Alexandra MD;  [facility];  Service: General    KNEE ARTHROSCOPY Left     LAPAROTOMY N/A 09/26/2022    Procedure: LAPAROTOMY EXPLORATORY, LYSIS OF ADHESIONS, LOW ANTERIOR RESECTION WITH PRIMARY ANASTOMOSIS;  Surgeon: Ashly Alexandra MD;  [facility];  Service: General    MOUTH SURGERY      [place] CLOSURE ENTEROSTOMY LG/SMALL INTESTINE N/A 11/23/2022    Procedure: CLOSURE COLOSTOMY, LYSIS OF ADEHSIONS, RIGID SIGMOIDOSCOPY;  Surgeon: Ashly Alexandra MD;  [facility];  Service: General    TX COLECTOMY PARTIAL W/ANASTOMOSIS N/A 09/26/2022    Procedure: RESECTION COLON SIGMOID;  Surgeon: Ashly Alexandra MD;  [facility];  Service: General    TX [address] RGD DX W/WO [address]LLJ [address] [facility] [address] BR/ [facility] N/A 09/26/2022    Procedure: Rocky Sequin RIGID;  Surgeon: Ashly Alexandra MD;  [facility];  Service: General        03/23/23 0923   PT Last Visit   PT Visit Date 03/23/23   Note Type   Note type Evaluation   Pain Assessment   Pain Assessment Tool 0-10   Pain Score 6   Pain Location/Orientation Location: Abdomen   Hospital Pain Intervention(s) Repositioned; Ambulation/increased activity; Emotional support   Restrictions/Precautions   Weight Bearing Precautions Per Order No   Braces or Orthoses   (abd binder)   Other Precautions Multiple lines; Fall Risk  (lifting restriction 10#)   Home Living   Type of [address] [place] [address] Multi-level;Stairs to enter with rails  (5 stairs to enter)   Prior Function   Level of [place] Independent with ADLs   Lives With   (4 other men)   Receives Help From Friend(s)   IADLs Independent with driving; Independent with meal prep; Independent with medication management   Comments pt lives with 4 other men in multilevel dwelling  pt indep , no falls  does not drive and walks for transportation  no dme   General   Family/Caregiver Present No   Cognition   Overall Cognitive Status WFL   Orientation Level Oriented X4   Subjective   Subjective has been up and walking to bathroom  RUE Assessment   RUE Assessment WFL   LUE Assessment   LUE Assessment WFL   RLE Assessment   RLE Assessment WNL   LLE Assessment   LLE Assessment WNL   Coordination   Movements are Fluid and Coordinated 1   Sensation WFL   Bed Mobility   Rolling R 7  Independent   Rolling L 7  Independent   Supine to Sit 7  Independent   Sit to Supine 7  Independent   Transfers   Sit to Stand 5  Supervision   Stand to Sit 5  Supervision   Ambulation/Elevation   Gait pattern WNL   Gait Assistance 7  Independent   Assistive Device None   Distance 300'   Stair Management Assistance 5  Supervision   Stair Management Technique One rail R;Alternating pattern; Foreward   Number of Stairs 5   Balance   Static Sitting Normal   Dynamic Sitting Good   Static Standing Good   Dynamic Standing Fair +   Ambulatory Fair +   Endurance Deficit   Endurance Deficit No   Activity Tolerance   Activity Tolerance Patient tolerated treatment well   Medical Staff Made Aware OT   Nurse Made Aware yes   Assessment   Assessment pt admitted with incisional hernia from prior surgery   pt underwent laparoscopic hernia repair, complicated by bladder perforatoin  pt has montejo, drains  pt referred to PT  pt was indep PTA, no assistive devices  pt does not drive, but walks to destinations  pt lives with 4 other men  pt does not own any dme  pt demonstrated indep mobility without assistive device  pt was using abdominal binder for incisional support  pt was able to amb long distance and perform stairs  pt velez current deficits in skin integrity, bladder integrity, pain control, balance but is intact with coordination, sensation and locomotion  pt does not need further skilled IPPT and will be able to return home  d/c PT   Barriers to Discharge None   Goals   Patient Goals find out why he needs montejo   Recommendation   PT Discharge Recommendation No rehabilitation needs   AM-PAC Basic Mobility Inpatient   Turning in Flat Bed Without Bedrails 4   Lying on Back to Sitting on Edge of Flat Bed Without Bedrails 4   Moving Bed to Chair 4   Standing Up From Chair Using Arms 4   Walk in Room 4   Climb 3-5 Stairs With Railing 4   Basic Mobility Inpatient Raw Score 24   Basic Mobility Standardized Score 57 68   Highest Level Of Mobility   JH-HLM Goal 8: Walk 250 feet or more   JH-HLM Achieved 8: Walk 250 feet ot more       An AM-PAC Basic Mobility standardized score less than 42 9 suggests the patient may benefit from discharge to post-acute rehab services      History: co - morbidities, fall risk, montejo, stairs, lifting restriction, multiple lines  Exam: systems evaluated: locomotion, musculoskeletal, balance,posture, joint integrity, skin integrity, cardiac, pulmonary, cognition   Clinical: unstable/unpredictable- surgery, restrictions  Complexity:high  Radha Gobble, PT

## 2023-03-23 NOTE — QUICK NOTE
General Surgery  Postop check  Saturnino Dapper 65 y o  male MRN: [id number]  Unit/Bed#: E2 -01 Encounter: [id number]    Assessment:  50-year-old male status post robotic ventral hernia repair C/B bladder injury primarily repaired with intraoperative urology consult    Afebrile, VSS    UOP: 370 cc  CHITRA: 20 cc serosanguineous    Plan:  - Clear liquid diet  - CHITRA drain in place; monitor output and character  - Saleem in place; appreciate urology recommendations  - Pain and nausea control as needed  - DVT prophylaxis  - Encourage ambulation    Subjective/Objective     Subjective: Patient seen and examined at bedside, in no acute distress  No acute events overnight  Patient's pain is well controlled  Pt denies nausea or vomiting  Denies bowel function  Saleem in place with clear yellow urine  Objective:     Vitals:Blood pressure 133/67, pulse 89, temperature 97 7 °F (36 5 °C), temperature source Temporal, resp  rate 20, height 5' 11" (1 803 m), weight 84 2 kg (185 lb 10 oz), SpO2 95 %  ,Body mass index is 25 89 kg/m²  Temp (24hrs), Av 8 °F (36 6 °C), Min:97 1 °F (36 2 °C), Max:99 3 °F (37 4 °C)  Current: Temperature: 97 7 °F (36 5 °C)      Intake/Output Summary (Last 24 hours) at 3/22/2023 2248  Last data filed at 3/22/2023 2033  Gross per 24 hour   Intake 3580 ml   Output 390 ml   Net 3190 ml       Invasive Devices     Peripheral Intravenous Line  Duration           Peripheral IV 23 Dorsal (posterior); Left Forearm <1 day          Drain  Duration           Closed/Suction Drain RLQ 10 Fr  <1 day    Urethral Catheter 16 Fr  <1 day                Physical Exam:  General: No acute distress, alert and oriented  CV: Well perfused, regular rate and rhythm  Lungs: Normal work of breathing, no increased respiratory effort  Abdomen: Soft, appropriately tender, nondistended; abdominal binder in place, once removed surgical dressings appeared clean without saturation, CHITRA in place as above  Extremities: No edema, clubbing or cyanosis  Skin: Warm, dry    Lab Results: Results: I have personally reviewed all pertinent laboratory/tests results  VTE Prophylaxis: Sequential compression device (Venodyne)  and Enoxaparin (Lovenox)    Mikey Farrell MD  3/22/2023

## 2023-03-23 NOTE — OCCUPATIONAL THERAPY NOTE
Occupational Therapy Evaluation/Discharge     Patient Name: Sanjuanita MONGE Date: 3/23/2023  Problem List  Principal Problem:    Incisional hernia  Active Problems:    Bladder perforation, intraoperative    Past Medical History  Past Medical History:   Diagnosis Date    Asthma     Bronchitis     COPD (chronic obstructive pulmonary disease) ([facility] )     COVID 10/2021    Hepatitis C     Infectious viral hepatitis     Liver disease     Pneumonia     Tinnitus      Past Surgical History  Past Surgical History:   Procedure Laterality Date    COLONOSCOPY W/ POLYPECTOMY  11/22/2022    6 MONTHS = MUST    COLOSTOMY N/A 09/26/2022    Procedure: COLOSTOMY END;  Surgeon: Zoey Argueta MD;  [facility];  Service: General    COLOSTOMY N/A 09/26/2022    Procedure: COLOSTOMY LOOP;  Surgeon: Zoey Argueta MD;  [facility];  Service: General    KNEE ARTHROSCOPY Left     LAPAROTOMY N/A 09/26/2022    Procedure: LAPAROTOMY EXPLORATORY, LYSIS OF ADHESIONS, LOW ANTERIOR RESECTION WITH PRIMARY ANASTOMOSIS;  Surgeon: Zoey Argueta MD;  [facility];  Service: General    MOUTH SURGERY      [place] CLOSURE ENTEROSTOMY LG/SMALL INTESTINE N/A 11/23/2022    Procedure: CLOSURE COLOSTOMY, LYSIS OF ADEHSIONS, RIGID SIGMOIDOSCOPY;  Surgeon: Zoey Argueta MD;  [facility];  Service: General    [place] COLECTOMY PARTIAL W/ANASTOMOSIS N/A 09/26/2022    Procedure: RESECTION COLON SIGMOID;  Surgeon: Zoey Argueta MD;  [facility];  Service: General    [place] [address] RGD DX W/WO [facility] BR/ [facility] N/A 09/26/2022    Procedure: Nicklas Friday RIGID;  Surgeon: Zoey Argueta MD;  [facility];  Service: General         03/23/23 0912   OT Last Visit   OT Visit Date 03/23/23   Note Type   Note type Evaluation   Pain Assessment   Pain Assessment Tool 0-10   Pain Score 6   Pain Location/Orientation Location: Abdomen   Hospital Pain Intervention(s) Repositioned; Ambulation/increased activity; Emotional support   Restrictions/Precautions   Weight Bearing Precautions Per Order No   Braces or Orthoses Other (Comment)  (abdominal binder)   Other Precautions Multiple lines; Fall Risk  (lifting restriction 10#)   Home Living   Type of [address] [place] [address] Multi-level;Stairs to enter with rails   Bathroom Shower/Tub Tub/shower unit   Bathroom Toilet Standard   Bathroom Equipment Other (Comment)  (none pet pt)   Additional Comments no AD at baseline   Prior Function   Level of [place] Independent with ADLs; Independent with functional mobility; Independent with IADLS   Lives With Other (Comment)  (four male roommates)   Receives Help From Friend(s)   IADLs Independent with driving; Independent with meal prep; Independent with medication management   Falls in the last 6 months 0   Subjective   Subjective "This isn't my first surgery"   ADL   Where Assessed Edge of bed   Eating Assistance 7  Independent   Grooming Assistance 7  Independent   UB Bathing Assistance 7  Independent   LB Bathing Assistance 6  [facility] 6  Modified independent   [address] 6  Modified independent   [address]  6  Modified independent   Bed Mobility   Rolling R 7  Independent   Rolling L 7  Independent   Supine to Sit 7  Independent   Sit to Supine 7  Independent   Transfers   Sit to Stand 7  Independent   Stand to Sit 7  Independent   Functional Mobility   Functional Mobility 5  Supervision   Additional Comments IV Pole   Balance   Static Sitting Normal   Dynamic Sitting Good   Static Standing Good   Dynamic Standing Fair +   Ambulatory Fair +   Activity Tolerance   Activity Tolerance Patient tolerated treatment well   Medical Staff Made Aware PT, CM, MD   Nurse Made Aware Yes   RUE Assessment   RUE Assessment WFL   LUE Assessment   LUE Assessment WFL   Hand Function   Gross Motor Coordination Functional   Fine Motor Coordination Functional   Sensation   Light Touch No apparent deficits   Proprioception   Proprioception No apparent deficits   Vision-Basic Assessment   Current Vision No visual deficits   Vision - Complex Assessment   Ocular Range of Motion Intact   Acuity Able to read clock/calendar on wall without difficulty; Able to read employee name badge without difficulty   Perception   Inattention/Neglect Appears intact   Cognition   Overall Cognitive Status [facility]   Arousal/Participation Alert; Responsive; Cooperative   Attention Within functional limits   Orientation Level Oriented X4   Memory Within functional limits   Following Commands Follows all commands and directions without difficulty   Assessment   Limitation Decreased endurance   Assessment Patient is a 65y o  year old male seen for OT eval s/p admit to [facility] on 3/22/2023 with incisional hernia with bladder perforation during procedure  Comorbidities affecting pt’s functional performance include a significant PMH of: hep C, asthma, COPD  Patient with active OT orders and activity orders for Activity as tolerated  Personal factors affecting pt at time of IE include: difficulty performing ADLs and IADLs, difficulty with functional mobility/transfers  Prior to admission, patient was (I) with ADLs/IADLs  Upon evaluation, he is functioning near baseline, education provided on alternative methods for LB dressing to decrease incisional strain/pain, verbalized understanding  Based on the aforementioned OT evaluation, functional performance deficits, and assessments, pt has been identified as a moderate complexity evaluation    Co treatment with PT secondary to complex medical condition of pt, possible A of 2 required to achieve and maintain transitional movements, requiring the need of skilled therapeutic intervention of 2 therapists to achieve delivery of services  The patient's raw score on the AM-PAC Daily Activity Inpatient Short Form is 24   A raw score of greater than or equal to 19 suggests the patient may benefit from discharge to home  Please refer to the recommendation of the Occupational Therapist for safe discharge planning     Goals   Patient Goals find out why he needs montejo   LT Time Frame 10-14   Plan   OT Frequency Eval only   Recommendation   OT Discharge Recommendation No rehabilitation needs   AM-PAC Daily Activity Inpatient   Lower Body Dressing 4   Bathing 4   Toileting 4   Upper Body Dressing 4   Grooming 4   Eating 4   Daily Activity Raw Score 24   Daily Activity Standardized Score (Calc for Raw Score >=11) 57 54   AM-PAC Applied Cognition Inpatient   Following a Speech/Presentation 4   Understanding Ordinary Conversation 4   Taking Medications 4   Remembering Where Things Are Placed or Put Away 4   Remembering List of 4-5 Errands 4   Taking Care of Complicated Tasks 4   Applied Cognition Raw Score 24   Applied Cognition Standardized Score 62 21     Janey Else

## 2023-03-23 NOTE — UTILIZATION REVIEW
Elective outpatient procedure, converted to inpatient admission due to  Bladder perf    Initial Clinical Review    Elective   OP surgical procedure  CONVERTED TO IP ADMISSIOn    Age/Sex: 65 y o  male     Surgery Date:     3/22/23    Procedure: REPAIR COMPLEX INCISIONAL HERNIA  LAPAROSCOPIC WITH MESH, ROBOTIC ASSISTED, AND BLADDER PERFERATION REPAIR       Anesthesia:    choice    Operative Findings:   Diastases in the lower abdomen of the midline, with some bladder incorporated into this around the pubic tubercle  After taking down the flap and sewing in the mesh, a small pinpoint hole was seen in the bladder and the preperitoneal space after instillation of methylene blue in the bladder   This was easily oversewn with figure-of-eight Vicryl suture and overlay second suture   Post repair methylene blue with 300 cc showed no evidence of leak      A preperitoneal drain was placed through a separate stab incision between the bladder and the mesh   Intraoperative curbside consult with urology was carried out by phone and this confirmed appropriate course of action  Post op   3/22  Sats in the high 80's  Even with nebulizers  Admits to smoking the morning of admission  Looks  Comfortable  Saleem to remain in place for  7 - 10 days  POD#1 Progress Note:   3/23  Urology consult  Saleem catheter intact,  Draining clear yellow  Urine  Maintain for 2 weeks        Admission Orders: Date/Time/Statement:   Admission Orders (From admission, onward)     Ordered        03/22/23 1638  Inpatient Admission  Once                      Orders Placed This Encounter   Procedures   • Inpatient Admission     Standing Status:   Standing     Number of Occurrences:   1     Order Specific Question:   Level of Care     Answer:   Med Surg [16]     Order Specific Question:   Estimated length of stay     Answer:   More than 2 Midnights     Order Specific Question:   Certification     Answer:   I certify that inpatient services are medically necessary for this patient for a duration of greater than two midnights  See H&P and MD Progress Notes for additional information about the patient's course of treatment       Vital Signs: /76 (BP Location: Left arm)   Pulse 83   Temp 98 1 °F (36 7 °C) (Temporal)   Resp 20   Ht 5' 11" (1 803 m)   Wt 84 2 kg (185 lb 10 oz)   SpO2 90%   BMI 25 89 kg/m²     Pertinent Labs/Diagnostic Test Results:   XR chest portable    (Results Pending)   FL cystogram    (Results Pending)         Results from last 7 days   Lab Units 03/23/23  0507   WBC Thousand/uL 9 26   HEMOGLOBIN g/dL 13 3   HEMATOCRIT % 38 7   PLATELETS Thousands/uL 156   NEUTROS ABS Thousands/µL 6 89         Results from last 7 days   Lab Units 03/23/23  0507   SODIUM mmol/L 139   POTASSIUM mmol/L 4 1   CHLORIDE mmol/L 108   CO2 mmol/L 26   ANION GAP mmol/L 5   BUN mg/dL 11   CREATININE mg/dL 0 64   EGFR ml/min/1 73sq m 109   CALCIUM mg/dL 8 5   MAGNESIUM mg/dL 1 6*   PHOSPHORUS mg/dL 2 7     Results from last 7 days   Lab Units 03/23/23  0507   AST U/L 18   ALT U/L 12   ALK PHOS U/L 33*   TOTAL PROTEIN g/dL 5 7*   ALBUMIN g/dL 3 5   TOTAL BILIRUBIN mg/dL 0 91         Results from last 7 days   Lab Units 03/23/23  0507   GLUCOSE RANDOM mg/dL 95             Diet:    regular    Mobility:   Up as  tolerated    DVT Prophylaxis:    SCD'S    Medications/Pain Control:   Scheduled Medications:  acetaminophen, 975 mg, Oral, Q8H ESCOBAR  heparin (porcine), 5,000 Units, Subcutaneous, Q8H ESCOBAR  methocarbamol, 750 mg, Oral, Q6H [address]  potassium-sodium phosphates, 2 packet, Oral, Once      Continuous IV Infusions:     PRN Meds:  ipratropium-albuterol, 3 mL, Nebulization, Q6H PRN  ketorolac, 15 mg, Intravenous, Q6H PRN  lactated ringers, 1,000 mL, Intravenous, Once PRN   And  lactated ringers, 1,000 mL, Intravenous, Once PRN  naloxone, 0 1 mg, Intravenous, Q3 min PRN  ondansetron, 4 mg, Intravenous, Q6H PRN  sodium chloride, 1,000 mL, Intravenous, Once PRN   And  sodium chloride, 1,000 mL, Intravenous, Once PRN        Network Utilization Review Department  ATTENTION: Please call with any questions or concerns to [phone] and carefully listen to the prompts so that you are directed to the right person  All voicemails are confidential   Lorna Deng all requests for admission clinical reviews, approved or denied determinations and any other requests to dedicated fax number below belonging to the campus where the patient is receiving treatment   List of dedicated fax numbers for the Facilities:  [address] DENIALS (Administrative/Medical Necessity) [phone]   [address] (Maternity/NICU/Pediatrics) [phone]   910 Cara Bojorquez [phone]   [facility] 77 [phone]   [facility]9 [address] [facility] 28 [phone]   155 [facility] 134 815 [facility] [phone]

## 2023-03-23 NOTE — CONSULTS
Consult - Urology   [facility] 1965, 65 y o  male MRN: [id number]    Unit/Bed#: E2 -01 Encounter: [id number]    Bladder perforation, intraoperative  Assessment & Plan  · Surgically repaired by Dr Marixa Liu during patient's hernia repair procedure  · Montejo catheter placed  · Currently patent with clear yellow output  · Patient to maintain montejo catheter x 2 weeks with cystogram prior  · Reviewed nature of cystogram and plan with patient  Also reviewed montejo catheter care as well  He is agreeable  · Oxybutynin XL 10 mg po daily prn sent to pharmacy for bladder spasms secondary to indwelling montejo catheter  · Patient is aware to discontinue this medication 24-48 hours prior to montejo catheter removal    · Our office will contact patient once discharged to schedule his follow up appt  · Clear for discharge from Urology standpoint  · Urology will sign off but remain available for any further inpatient needs  Please feel free to contact the provider currently covering the Urology [facility] role for this campus with questions or concerns  Subjective:   Patient is a 45-year-old male Pod#1 robotic ventral hernia repair with bladder perforation repair  Patient's Montejo catheter is patent draining clear yellow urine this morning  He does note urinary urgency suggestive of bladder spasms  Denies any flank pain  Denies any fevers or chills  Review of Systems   Constitutional: Negative for chills and fever  HENT: Negative  Respiratory: Negative  Cardiovascular: Negative  Gastrointestinal: Positive for abdominal pain (postop mild abdominal pain)  Negative for nausea and vomiting  Genitourinary: Positive for urgency  Negative for flank pain, hematuria, scrotal swelling and testicular pain  Musculoskeletal: Negative  Skin: Negative  Neurological: Negative        Objective:  Vitals: Blood pressure 117/76, pulse 83, temperature 98 1 °F (36 7 °C), temperature source Temporal, resp  rate 20, height 5' 11" (1 803 m), weight 84 2 kg (185 lb 10 oz), SpO2 90 %  ,Body mass index is 25 89 kg/m²  Physical Exam  Vitals reviewed  Constitutional:       General: He is not in acute distress  Appearance: Normal appearance  He is not ill-appearing, toxic-appearing or diaphoretic  HENT:      Head: Normocephalic and atraumatic  Eyes:      Conjunctiva/sclera: Conjunctivae normal    Pulmonary:      Effort: Pulmonary effort is normal  No respiratory distress  Abdominal:      General: There is no distension  Palpations: Abdomen is soft  Tenderness: There is no abdominal tenderness  There is no right CVA tenderness, left CVA tenderness, guarding or rebound  Comments: Surgical incision sites clean dry and intact  Genitourinary:     Comments: Saleem catheter patent draining clear yellow urine  Musculoskeletal:         General: Normal range of motion  Cervical back: Normal range of motion  Skin:     General: Skin is warm and dry  Neurological:      General: No focal deficit present  Mental Status: He is alert and oriented to person, place, and time  Psychiatric:         Mood and Affect: Mood normal          Behavior: Behavior normal          Thought Content: Thought content normal          Judgment: Judgment normal          Imaging:  No new imaging       Labs:  Recent Labs     03/23/23  0507   WBC 9 26     Recent Labs     03/23/23  0507   HGB 13 3       Recent Labs     03/23/23  0507   CREATININE 0 64       Microbiology:  N/A    History:  Social History     Socioeconomic History   • Marital status: Single     Spouse name: None   • Number of children: None   • Years of education: None   • Highest education level: None   Occupational History   • None   Tobacco Use   • Smoking status: Every Day     Packs/day: 0 25     Types: Cigarettes   • Smokeless tobacco: Never   • Tobacco comments:     Last smoked today 3 2 23   Vaping Use   • Vaping Use: Never used Substance and Sexual Activity   • Alcohol use: Not Currently   • Drug use: Not Currently     Types: Marijuana, Heroin, Methamphetamines, Fentanyl     Comment: nothing since 12/20/2020   • Sexual activity: Not Currently   Other Topics Concern   • None   Social History Narrative    Flu shot:no     Social Determinants of Health     Financial Resource Strain: Not on file   Food Insecurity: Not on file   Transportation Needs: Not on file   Physical Activity: Not on file   Stress: Not on file   Social Connections: Not on file   Intimate Partner Violence: Not on file   Housing Stability: Not on file       Past Medical History:   Diagnosis Date   • Asthma    • Bronchitis    • COPD (chronic obstructive pulmonary disease) ([facility] [address] )    • COVID 10/2021   • Hepatitis C    • Infectious viral hepatitis    • Liver disease    • Pneumonia    • Tinnitus      Past Surgical History:   Procedure Laterality Date   • COLONOSCOPY W/ POLYPECTOMY  11/22/2022    6 MONTHS = MUST   • COLOSTOMY N/A 09/26/2022    Procedure: COLOSTOMY END;  Surgeon: Christophe Calderón MD;  [facility];  Service: General   • COLOSTOMY N/A 09/26/2022    Procedure: COLOSTOMY LOOP;  Surgeon: Christophe Calderón MD;  [facility];  Service: General   • KNEE ARTHROSCOPY Left    • LAPAROTOMY N/A 09/26/2022    Procedure: LAPAROTOMY EXPLORATORY, LYSIS OF ADHESIONS, LOW ANTERIOR RESECTION WITH PRIMARY ANASTOMOSIS;  Surgeon: Christophe Calderón MD;  [facility];  Service: General   • MOUTH SURGERY     • OH CLOSURE ENTEROSTOMY LG/SMALL INTESTINE N/A 11/23/2022    Procedure: CLOSURE COLOSTOMY, LYSIS OF ADEHSIONS, RIGID SIGMOIDOSCOPY;  Surgeon: Christophe Calderón MD;  [facility];  Service: General   • OH COLECTOMY PARTIAL W/ANASTOMOSIS N/A 09/26/2022    Procedure: RESECTION COLON SIGMOID;  Surgeon: Christophe Calderón MD;  [facility];  Service: General   • OH [address] RGD DX W/WO COLLJ [address]/ [facility] N/A 09/26/2022    Procedure: SIGMOIDOSCOPY RIGID;  Surgeon: Zoey Argueta MD;  [facility];  Service: General     History reviewed  No pertinent family history      Frederic Roger PA-C  Date: 3/23/2023 Time: 11:27 AM

## 2023-03-24 ENCOUNTER — HOSPITAL ENCOUNTER (EMERGENCY)
Facility: HOSPITAL | Age: 58
Discharge: HOME | End: 2023-03-24
Attending: EMERGENCY MEDICINE

## 2023-03-24 VITALS
SYSTOLIC BLOOD PRESSURE: 123 MMHG | TEMPERATURE: 97.9 F | HEIGHT: 71 IN | HEART RATE: 71 BPM | DIASTOLIC BLOOD PRESSURE: 78 MMHG | WEIGHT: 189.38 LBS | BODY MASS INDEX: 26.51 KG/M2 | OXYGEN SATURATION: 97 % | RESPIRATION RATE: 16 BRPM

## 2023-03-24 DIAGNOSIS — N99.72 BLADDER PERFORATION, INTRAOPERATIVE: ICD-10-CM

## 2023-03-24 DIAGNOSIS — T83.9XXA FOLEY CATHETER PROBLEM (HCC): Primary | ICD-10-CM

## 2023-03-24 DIAGNOSIS — S37.20XA INJURY OF BLADDER, INITIAL ENCOUNTER: Primary | ICD-10-CM

## 2023-03-24 LAB
AMORPH PHOS CRY URNS QL MICRO: ABNORMAL /HPF
BACTERIA UR QL AUTO: ABNORMAL /HPF
BILIRUB UR QL STRIP: NEGATIVE
CLARITY UR: CLEAR
COLOR UR: YELLOW
GLUCOSE UR STRIP-MCNC: NEGATIVE MG/DL
HGB UR QL STRIP.AUTO: ABNORMAL
KETONES UR STRIP-MCNC: NEGATIVE MG/DL
LEUKOCYTE ESTERASE UR QL STRIP: ABNORMAL
MUCOUS THREADS UR QL AUTO: ABNORMAL
NITRITE UR QL STRIP: NEGATIVE
NON-SQ EPI CELLS URNS QL MICRO: ABNORMAL /HPF
PH UR STRIP.AUTO: 8.5 [PH] (ref 4.5–8)
PROT UR STRIP-MCNC: NEGATIVE MG/DL
RBC #/AREA URNS AUTO: ABNORMAL /HPF
SP GR UR STRIP.AUTO: 1.02 (ref 1–1.03)
UROBILINOGEN UR QL STRIP.AUTO: 2 E.U./DL
WBC #/AREA URNS AUTO: ABNORMAL /HPF

## 2023-03-24 NOTE — UTILIZATION REVIEW
NOTIFICATION OF ADMISSION DISCHARGE   This is a Notification of Discharge from [address] [place] [address]  Please be advised that this patient has been discharge from our facility  Below you will find the admission and discharge date and time including the patient’s disposition  UTILIZATION REVIEW CONTACT:  Yasmin Frye  Utilization   Network Utilization Review Department  Phone: [phone] x carefully listen to the prompts  All voicemails are confidential   Email: [email or web address]     ADMISSION INFORMATION  PRESENTATION DATE: 3/22/2023 10:00 AM  OBERVATION ADMISSION DATE:   INPATIENT ADMISSION DATE: 3/22/23  4:38 PM   DISCHARGE DATE: 3/23/2023  3:45 PM   DISPOSITION:Home with Home Health Care    IMPORTANT INFORMATION:  Send all requests for admission clinical reviews, approved or denied determinations and any other requests to dedicated fax number below belonging to the campus where the patient is receiving treatment   List of dedicated fax numbers:  [address] DENIALS (Administrative/Medical Necessity) [phone]   [address] (Maternity/NICU/Pediatrics) [phone]   [facility] [phone]   [facility] 87 [phone]   [address] [phone]   220 [facility] [place] [phone] 90 [facility] [phone]   [address] [place], [place] [address] [address] [phone]   [facility]  [phone]   4054 [facility] [phone]   412 [facility] 850 [address] [facility] [phone]

## 2023-03-24 NOTE — TELEPHONE ENCOUNTER
S/P HERNIA REPAIR 3/22/23    Unable to reach patient  Left message to call the office back  No path sent

## 2023-03-24 NOTE — TELEPHONE ENCOUNTER
S/P hernia repair 3/22/23    Patient called back  He denies any F/V/N/C  He states he is still in a lot of pain  I told his to take his pain meds as directed and use ice for the pain  Patient understood  Patient is aware he can remove all the dressing on top of his incision and shower  Let water and soap run over the incision  Patient is aware he needs to come in on Monday for his drain  That appointment was scheduled  He is aware to call the office with any questions or concerns  No path sent

## 2023-03-24 NOTE — QUICK NOTE
Patient seen and evaluated in ED with Dr Acosta Newer  Pt repots that overnight he noticed decreased drainage from urinary catheter  Catheter was only draining while he was standing/sitting, no drainage while laying  Early this morning pt noticed increased drainage from CHITRA drain associated with mild abdominal distension  Reports that now all pain has resolved  Patient examined and found to have montejo bag directly connected to catheter, no tubing present, bag attached to upper thigh  600cc in bag    CHITRA functioning appropriately  Abdomen appropriately tender post-op  Recommending to attach montejo catheter to tubing and new bag  Education on montejo care and management  No need for additional imaging at this time   Outpatient follow up    Discussed with Dr Garcia Daily  03/24/23

## 2023-03-24 NOTE — ED PROVIDER NOTES
History  Chief Complaint   Patient presents with   • Post-op Problem     Reports hernia surgery 2 days ago  Last night he went to get up to draing his catheter and got a sudden severe pain at the incision sites and above his bladder  Reports he became diaphoretic and near syncope  Pt then states hit CHITRA drain filled up twice in a short amount of time  Also reports leakage around abdomen bandage  Reports pain only when laying down     58y M POD 2 Robotic assisted ventral hernia repair complicated by bladder perforation s/p intra-op closure  discharged yesterday w/ montjeo/leg bag and CHITRA drain  woke overnight w/ abd distension, cold sweats - noted leg bag was empty  got up to try and have bm and had some urine outpt into the bag  over the next few hours, multiple episodes of chills/cold sweats, nausea and no drainage from montejo when laying flat  reports abd was distended, abd binder and dressing surrounding CHITRA drain "soaked"  this am, tried sleeping sitting up which allowed for urine drainage  since overnight/this am believes he's emptied his CHITRA at least 4-5 times and believes the volume was 400-500ml - initially karime blood and now serosanguinous  having dark urine drainage in montejo bag  no longer w/ chills  History provided by:  Patient and medical records   used: No    Medical Problem  Location:  POD 2 robotic assisted ventral hernia repair c/v bladder perf s/p intra-op repair  Quality:  See above  Severity:  Severe  Onset quality:  Sudden  Progression:  Partially resolved  Chronicity:  New  Context:  See above  Relieved by:  Urine draining in montejo  Worsened by:  Urine not draining  Ineffective treatments:  N/a  Associated symptoms: abdominal pain and nausea    Associated symptoms: no diarrhea, no fatigue, no fever, no myalgias, no shortness of breath and no vomiting        Prior to Admission Medications   Prescriptions Last Dose Informant Patient Reported?  Taking?   acetaminophen (TYLENOL) 325 mg tablet   No No   Sig: Take 3 tablets (975 mg total) by mouth every 8 (eight) hours   albuterol (PROVENTIL HFA,VENTOLIN HFA) 90 mcg/act inhaler   Yes No   Sig: Inhale 2 puffs every 6 (six) hours as needed for wheezing   ibuprofen (MOTRIN) 200 mg tablet   No No   Sig: Take 3 tablets (600 mg total) by mouth every 6 (six) hours as needed for mild pain   methocarbamol (ROBAXIN) 750 mg tablet   No No   Sig: Take 1 tablet (750 mg total) by mouth every 6 (six) hours for 7 days   oxybutynin (DITROPAN-XL) 10 MG 24 hr tablet   No No   Sig: Take 1 tablet (10 mg total) by mouth daily as needed (bladder spasms secondary to montejo catheter)      Facility-Administered Medications: None       Past Medical History:   Diagnosis Date   • Asthma    • Bronchitis    • COPD (chronic obstructive pulmonary disease) (HCC)    • COVID 10/2021   • Hepatitis C    • Infectious viral hepatitis    • Liver disease    • Pneumonia    • Tinnitus        Past Surgical History:   Procedure Laterality Date   • COLONOSCOPY W/ POLYPECTOMY  11/22/2022    6 MONTHS = MUST   • COLOSTOMY N/A 09/26/2022    Procedure: COLOSTOMY END;  Surgeon: Yani Stevenson MD;  [facility];  Service: General   • COLOSTOMY N/A 09/26/2022    Procedure: COLOSTOMY LOOP;  Surgeon: Yani Stevenson MD;  [facility];  Service: General   • KNEE ARTHROSCOPY Left    • LAPAROTOMY N/A 09/26/2022    Procedure: LAPAROTOMY EXPLORATORY, LYSIS OF ADHESIONS, LOW ANTERIOR RESECTION WITH PRIMARY ANASTOMOSIS;  Surgeon: Yani Stevenson MD;  [facility];  Service: General   • MOUTH SURGERY     • RI CLOSURE ENTEROSTOMY LG/SMALL INTESTINE N/A 11/23/2022    Procedure: CLOSURE COLOSTOMY, LYSIS OF ADEHSIONS, RIGID SIGMOIDOSCOPY;  Surgeon: Yani Stevenson MD;  [facility];  Service: General   • RI COLECTOMY PARTIAL W/ANASTOMOSIS N/A 09/26/2022    Procedure: RESECTION COLON SIGMOID;  Surgeon: Yani Stevenson MD;  [facility];  Service: General   • RI [address] RGD DX W/WO COLLJ SPEC BR/WA SPX N/A 09/26/2022    Procedure: SIGMOIDOSCOPY RIGID;  Surgeon: Cari Sanchez MD;  [facility];  Service: General       History reviewed  No pertinent family history  I have reviewed and agree with the history as documented  E-Cigarette/Vaping   • E-Cigarette Use Never User      E-Cigarette/Vaping Substances     Social History     Tobacco Use   • Smoking status: Every Day     Packs/day: 0 25     Types: Cigarettes   • Smokeless tobacco: Never   • Tobacco comments:     Last smoked today 3 2 23   Vaping Use   • Vaping Use: Never used   Substance Use Topics   • Alcohol use: Not Currently   • Drug use: Not Currently     Types: Marijuana, Heroin, Methamphetamines, Fentanyl     Comment: nothing since 12/20/2020       Review of Systems   Constitutional: Negative for fatigue and fever  Respiratory: Negative for shortness of breath  Gastrointestinal: Positive for abdominal pain and nausea  Negative for diarrhea and vomiting  Musculoskeletal: Negative for myalgias  All other systems reviewed and are negative  Physical Exam  Physical Exam  Vitals and nursing note reviewed  Constitutional:       Appearance: Normal appearance  HENT:      Mouth/Throat:      Mouth: Mucous membranes are moist    Eyes:      Conjunctiva/sclera: Conjunctivae normal    Cardiovascular:      Rate and Rhythm: Normal rate  Pulmonary:      Effort: Pulmonary effort is normal       Breath sounds: Normal breath sounds  Abdominal:      General: Bowel sounds are normal       Palpations: Abdomen is soft  Tenderness: There is abdominal tenderness (very mild)  Genitourinary:     Penis: Normal        Testes: Normal       Comments: Indwelling montejo catheter in place, no bleeding/drainage from meatus  Musculoskeletal:         General: Normal range of motion  Skin:     General: Skin is warm  Neurological:      General: No focal deficit present  Mental Status: He is alert  Vital Signs  ED Triage Vitals   Temperature Pulse Respirations Blood Pressure SpO2   03/24/23 1019 03/24/23 1019 03/24/23 1019 03/24/23 1019 03/24/23 1019   97 9 °F (36 6 °C) 75 18 126/86 96 %      Temp Source Heart Rate Source Patient Position - Orthostatic VS BP Location FiO2 (%)   03/24/23 1019 03/24/23 1019 03/24/23 1019 03/24/23 1019 --   Oral Monitor Sitting Right arm       Pain Score       03/24/23 1239       2           Vitals:    03/24/23 1019 03/24/23 1239   BP: 126/86 123/78   Pulse: 75 71   Patient Position - Orthostatic VS: Sitting          Visual Acuity      ED Medications  Medications - No data to display    Diagnostic Studies  Results Reviewed     Procedure Component Value Units Date/Time    Urine Microscopic [[id number]]  (Abnormal) Collected: 03/24/23 1225    Lab Status: Final result Specimen: Urine, Other Updated: 03/24/23 1250     RBC, UA 4-10 /hpf      WBC, UA 4-10 /hpf      Epithelial Cells Occasional /hpf      Bacteria, UA Occasional /hpf      AMORPH PHOSPATES Occasional /hpf      MUCUS THREADS Occasional    Urine Macroscopic, POC [[id number]]  (Abnormal) Collected: 03/24/23 1225    Lab Status: Final result Specimen: Urine Updated: 03/24/23 1226     Color, UA Yellow     Clarity, UA Clear     pH, UA 8 5     Leukocytes, UA Moderate     Nitrite, UA Negative     Protein, UA Negative mg/dl      Glucose, UA Negative mg/dl      Ketones, UA Negative mg/dl      Urobilinogen, UA 2 0 E U /dl      Bilirubin, UA Negative     Occult Blood, UA Moderate     Specific Baggs, UA 1 020    Narrative:      CLINITEK RESULT                 No orders to display              Procedures  Procedures         ED Course  ED Course as of 03/24/23 1809   Fri Mar 24, 2023   1132 TT sent to surgery   1133 Forwarded message to surgical YOSEPH (resident in morning education)     56 Someone will come evaluate patient and let me know if they would like a workup, etc   1304 Bacteria, UA: Occasional Medical Decision Making  Positional urine outpt and increased drainage from CHITRA 2d post op   ddx includes uti, post op complication (urine leakage, developing infection)  Will ck UA and discuss further w/u w/ surgery    Saleem catheter problem [facility]): complicated acute illness or injury     Details: no evidence of uti  Amount and/or Complexity of Data Reviewed  External Data Reviewed: notes  Details: inpatient notes, discharge summary reviewed  Labs:  Decision-making details documented in ED Course  Discussion of management or test interpretation with external provider(s): Discussed with General surgery        Disposition  Final diagnoses: Saleem catheter problem [facility])     Time reflects when diagnosis was documented in both MDM as applicable and the Disposition within this note     Time User Action Codes Description Comment    3/24/2023 12:02 PM Rebecca [address] [facility]  9XXA] Saleem catheter problem [facility])       ED Disposition     ED Disposition   Discharge    Condition   Stable    Date/Time   Fri Mar 24, 2023 12:26 PM    Comment   Raj Martinez discharge to home/self care                 Follow-up Information     Follow up With Specialties Details Why Contact Info Additional Information    [facility]  follow up as previously directed [id number] [facility] [address] [facility] [id number]  [address] [place] [address] [address], [address], [place], [place], [facility] [address]           Discharge Medication List as of 3/24/2023  1:05 PM      CONTINUE these medications which have NOT CHANGED    Details   acetaminophen (TYLENOL) 325 mg tablet Take 3 tablets (975 mg total) by mouth every 8 (eight) hours, Starting Thu 3/23/2023, No Print      albuterol (PROVENTIL HFA,VENTOLIN HFA) 90 mcg/act inhaler Inhale 2 puffs every 6 (six) hours as needed for wheezing, Historical Med      ibuprofen (MOTRIN) 200 mg tablet Take 3 tablets (600 mg total) by mouth every 6 (six) hours as needed for mild pain, Starting Thu 3/23/2023, No Print      methocarbamol (ROBAXIN) 750 mg tablet Take 1 tablet (750 mg total) by mouth every 6 (six) hours for 7 days, Starting Thu 3/23/2023, Until Thu 3/30/2023, Normal      oxybutynin (DITROPAN-XL) 10 MG 24 hr tablet Take 1 tablet (10 mg total) by mouth daily as needed (bladder spasms secondary to montejo catheter), Starting Thu 3/23/2023, Normal             No discharge procedures on file      PDMP Review       Value Time User    PDMP Reviewed  Yes 9/29/2022  8:45 AM Jamal Yanes PA-C          ED Provider  Electronically Signed by           Sravani Meyers DO  03/24/23 2040

## 2023-03-24 NOTE — TELEPHONE ENCOUNTER
(Delayed entry)    Within 10 minutes of clinical completing POPV call, I received call from [facility] clinical at PCP office  They telephoned patient to complete OSMAR  He states that his cath got "pulled out while he was sleeping and now his belly is filling up " She told patient to report to the ED  He stated that he was going to walk  She suggested he call the ambulance and patient refused  She called to give our office the heads up  ADT order was entered  Surgeon was tiger texted  Received call from patient  He states that he is unable to walk to the hospital  Could I call him an Amna Payment? I told patient to call the ambulance  He verbalized understanding

## 2023-03-25 ENCOUNTER — HOME CARE VISIT (OUTPATIENT)
Dept: HOME HEALTH SERVICES | Facility: HOME HEALTHCARE | Age: 58
End: 2023-03-25

## 2023-03-25 VITALS
RESPIRATION RATE: 18 BRPM | HEART RATE: 61 BPM | SYSTOLIC BLOOD PRESSURE: 110 MMHG | DIASTOLIC BLOOD PRESSURE: 70 MMHG | OXYGEN SATURATION: 97 % | TEMPERATURE: 97.7 F

## 2023-03-25 NOTE — CASE COMMUNICATION
[facility] has admitted your patient to [address] [place] [address] service with the following disciplines:      SN  This report is informational only, no responses is needed  Primary focus of home health care  [facility]  Patient stated goals of care    WES drain and montejo to be remove   Anticipated visit pattern and next visit date  1w1  2w3  1w3  next visit tuesday  See medication list - meds in home differ from AVS  all  meds at home  pt is onyl using ibuprofen and albuterol PRN  Significant clinical findings  Reports abdominal pain, weakness, decrease endurance  Potential barriers to goal achievement  na  Other pertinent information    Pt reports going to ED yesterday for severe abdominal pain, increase wes drain output, increase leakage on his wes and no output on his montejo  This was resolved using standard bag at night and to only switch it to leg b ag when pt is awake  Pt leg bag was hang above his bladder which prevented for the urine to flow to his catheter  therefore, it drained through his WES drain and leakes through his WES  Thank you for allowing us to participate in the care of your patient

## 2023-03-27 ENCOUNTER — OFFICE VISIT (OUTPATIENT)
Dept: SURGERY | Facility: CLINIC | Age: 58
End: 2023-03-27

## 2023-03-27 VITALS
DIASTOLIC BLOOD PRESSURE: 80 MMHG | TEMPERATURE: 96.7 F | BODY MASS INDEX: 25.56 KG/M2 | WEIGHT: 182.6 LBS | SYSTOLIC BLOOD PRESSURE: 120 MMHG | HEIGHT: 71 IN | HEART RATE: 70 BPM

## 2023-03-27 DIAGNOSIS — R10.31 RLQ ABDOMINAL PAIN: Primary | ICD-10-CM

## 2023-03-27 DIAGNOSIS — G89.18 POST-OP PAIN: ICD-10-CM

## 2023-03-27 NOTE — PROGRESS NOTES
"Assessment/Plan:   Pino Carrillo is a 65 y o male who comes in today for postoperative check after [address], ROBOTIC ASSISTED, AND BLADDER [address]  with Dr Ela Peguero on 3/22/23 with Dr Ela Peguero  CHITRA drain in place and montejo catheter in place  Mesh used: 10 x 15 cm coated ST dual sided mesh     Pathology: None sent    Plan:  1  Urology: Montejo catheter placed: Per urology note patient will be called for appointment  ? Currently patent with clear yellow output  ? Patient to maintain montejo catheter x 2 weeks with cystogram prior  ? Reviewed nature of cystogram and plan with patient  Also reviewed montejo catheter care as well  He is agreeable  ? Oxybutynin XL 10 mg po daily prn sent to pharmacy for bladder spasms secondary to indwelling montejo catheter  2  CHITRA drain:35cc drainage yesterday  Almost none today  Minimal serosanguinous fluid in drain tubing  Removed in office today  Wear binder very tight until next PO visit  Reviewed signs to go to ER for  Postoperative restrictions reviewed  All questions answered  ______________________________________________________  HPI:  Pino Carrillo is a 65 y o male who comes in today for postoperative check after recent [address], ROBOTIC ASSISTED, AND BLADDER [address]  with Dr Ela Peguero on 3/22/23 with Dr Ela Peguero  Currently doing well with some problems : pain in RLQ where drain site is worsening this morning  Was feeling good after the ER visit with moving around  Now hurts to walk and sit in RLQ since this morning  Normal output in montejo cathetor  No erythema or infection, no fever or chills, no nausea and no vomiting  ER note 3/24/23 POD2:  \"57y M POD 2 Robotic assisted ventral hernia repair complicated by bladder perforation s/p intra-op closure  discharged yesterday w/ montejo/leg bag and CHITRA drain   woke overnight w/ abd " "distension, cold sweats - noted leg bag was empty  got up to try and have bm and had some urine outpt into the bag  over the next few hours, multiple episodes of chills/cold sweats, nausea and no drainage from montejo when laying flat  reports abd was distended, abd binder and dressing surrounding CHITRA drain \"soaked\"  this am, tried sleeping sitting up which allowed for urine drainage  since overnight/this am believes he's emptied his CHITRA at least 4-5 times and believes the volume was 400-500ml - initially karime blood and now serosanguinous  having dark urine drainage in montejo bag  no longer w/ chills  \"    ROS:  General ROS: negative for - chills, fatigue, fever or night sweats, weight loss  Respiratory ROS: no cough, shortness of breath, or wheezing  Cardiovascular ROS: no chest pain or dyspnea on exertion  Genito-Urinary ROS: no dysuria, trouble voiding, or hematuria  Musculoskeletal ROS: negative for - gait disturbance, joint pain or muscle pain  Neurological ROS: no TIA or stroke symptoms  GI ROS: see HPI  Skin ROS: no new rashes or lesions   Lymphatic ROS: no new adenopathy noted by pt     GYN ROS: see HPI, no new GYN history or bleeding noted  Psy ROS: no new mental or behavioral disturbances         Patient Active Problem List   Diagnosis   • Asthma   • Hepatitis C   • COPD (chronic obstructive pulmonary disease) (HCC)   • Incisional hernia   • Bladder perforation, intraoperative       Allergies:  Bactrim [sulfamethoxazole-trimethoprim], Ciprofloxacin, Dust mite extract, and Sulfa antibiotics      Current Outpatient Medications:   •  acetaminophen (TYLENOL) 325 mg tablet, Take 3 tablets (975 mg total) by mouth every 8 (eight) hours, Disp: , Rfl: 0  •  albuterol (PROVENTIL HFA,VENTOLIN HFA) 90 mcg/act inhaler, Inhale 2 puffs every 6 (six) hours as needed for wheezing, Disp: , Rfl:   •  ibuprofen (MOTRIN) 200 mg tablet, Take 3 tablets (600 mg total) by mouth every 6 (six) hours as needed for mild pain, Disp: , Rfl: " 0  •  methocarbamol (ROBAXIN) 750 mg tablet, Take 1 tablet (750 mg total) by mouth every 6 (six) hours for 7 days (Patient not taking: Reported on 3/25/2023), Disp: 28 tablet, Rfl: 0  •  oxybutynin (DITROPAN-XL) 10 MG 24 hr tablet, Take 1 tablet (10 mg total) by mouth daily as needed (bladder spasms secondary to montejo catheter) (Patient not taking: Reported on 3/25/2023), Disp: 12 tablet, Rfl: 0    Past Medical History:   Diagnosis Date   • Asthma    • Bronchitis    • COPD (chronic obstructive pulmonary disease) ([address] )    • COVID 10/2021   • Hepatitis C    • Infectious viral hepatitis    • Liver disease    • Pneumonia    • Tinnitus        Past Surgical History:   Procedure Laterality Date   • COLONOSCOPY W/ POLYPECTOMY  11/22/2022    6 MONTHS = MUST   • COLOSTOMY N/A 09/26/2022    Procedure: COLOSTOMY END;  Surgeon: Chan Muñoz MD;  Location: [facility];  Service: General   • COLOSTOMY N/A 09/26/2022    Procedure: COLOSTOMY LOOP;  Surgeon: Chan Muñoz MD;  [facility];  Service: General   • INCISIONAL HERNIA REPAIR N/A 3/22/2023    Procedure: REPAIR COMPLEX INCISIONAL HERNIA  LAPAROSCOPIC WITH MESH, ROBOTIC ASSISTED, AND BLADDER [address];  Surgeon: Chan Muñoz MD;  Location: [facility];  Service: General   • KNEE ARTHROSCOPY Left    • LAPAROTOMY N/A 09/26/2022    Procedure: LAPAROTOMY EXPLORATORY, LYSIS OF ADHESIONS, LOW ANTERIOR RESECTION WITH PRIMARY ANASTOMOSIS;  Surgeon: Chan Muñoz MD;  Location[facility];  Service: General   • MOUTH SURGERY     • OK CLOSURE ENTEROSTOMY LG/SMALL INTESTINE N/A 11/23/2022    Procedure: CLOSURE COLOSTOMY, LYSIS OF ADEHSIONS, RIGID SIGMOIDOSCOPY;  Surgeon: Chan Muñoz MD;  [facility];  Service: General   • OK COLECTOMY PARTIAL W/ANASTOMOSIS N/A 09/26/2022    Procedure: RESECTION COLON SIGMOID;  Surgeon: Chan Muñoz MD;  [facility];  Service: General   • OK [address] RGD DX W/WO COLLJ SPEC BR/WA SPX N/A 09/26/2022    Procedure: SIGMOIDOSCOPY RIGID;  Surgeon: Ginny Willett MD;  Location: [facility];  Service: General       History reviewed  No pertinent family history  reports that he has been smoking cigarettes  He has been smoking an average of  25 packs per day  He has never used smokeless tobacco  He reports that he does not currently use alcohol  He reports that he does not currently use drugs after having used the following drugs: Marijuana, Heroin, Methamphetamines, and Fentanyl  Vitals:    03/27/23 0938   BP: 120/80   Pulse: 70   Temp: (!) 96 7 °F (35 9 °C)       PHYSICAL EXAM  General: normal, cooperative, no distress  Abdominal: soft, non distended  Tenderness in RLQ near drain site  No erythema or heat  CHITRA in place - stitch removed and drain pulled  Patient feels better and less pain after drain removed  Incision: clean, dry, and intact and healing well    Saleem catheter looks like normal urine  No drank blood      Hilda Weaver PA-C    Date: 3/27/2023 Time: 10:10 AM

## 2023-03-27 NOTE — TELEPHONE ENCOUNTER
Spoke with patient Cystogram scheduled for 4/5/23 at 10:45, [employer] arranged for 10:00   Saleem pull scheduled at [facility] 4/6/23 at 9:30, [employer] will be picking patient up at 8:45  Patient aware of appointments

## 2023-03-27 NOTE — TELEPHONE ENCOUNTER
Alisia Henry called requesting that we call patient to schedule him for 2 week montejo removal  Patient will also need office to arrange [employer] for him      Patient can be reached [phone]

## 2023-03-28 ENCOUNTER — HOME CARE VISIT (OUTPATIENT)
Dept: HOME HEALTH SERVICES | Facility: HOME HEALTHCARE | Age: 58
End: 2023-03-28

## 2023-03-31 ENCOUNTER — HOME CARE VISIT (OUTPATIENT)
Dept: HOME HEALTH SERVICES | Facility: HOME HEALTHCARE | Age: 58
End: 2023-03-31

## 2023-04-02 VITALS
OXYGEN SATURATION: 92 % | HEART RATE: 70 BPM | DIASTOLIC BLOOD PRESSURE: 60 MMHG | SYSTOLIC BLOOD PRESSURE: 138 MMHG | TEMPERATURE: 98.4 F | RESPIRATION RATE: 18 BRPM

## 2023-04-04 ENCOUNTER — HOME CARE VISIT (OUTPATIENT)
Dept: HOME HEALTH SERVICES | Facility: HOME HEALTHCARE | Age: 58
End: 2023-04-04

## 2023-04-04 VITALS
TEMPERATURE: 98 F | OXYGEN SATURATION: 98 % | HEART RATE: 78 BPM | SYSTOLIC BLOOD PRESSURE: 112 MMHG | RESPIRATION RATE: 20 BRPM | DIASTOLIC BLOOD PRESSURE: 68 MMHG

## 2023-04-05 ENCOUNTER — ANESTHESIA (OUTPATIENT)
Dept: PERIOP | Facility: HOSPITAL | Age: 58
End: 2023-04-05

## 2023-04-05 NOTE — TELEPHONE ENCOUNTER
Will monitor for cystogram results-patient scheduled for this morning at 10:45  Will need to have provider review results to ensure ok to remove Saleem catheter as scheduled tomorrow

## 2023-04-05 NOTE — TELEPHONE ENCOUNTER
Contacted Sean Taylor and made him aware Dr Parul Blandon reviewed cystogram results and patient ok to proceed with Saleem removal as scheduled tomorrow morning  [employer] was previously arranged for patient

## 2023-04-06 ENCOUNTER — PROCEDURE VISIT (OUTPATIENT)
Dept: UROLOGY | Facility: CLINIC | Age: 58
End: 2023-04-06

## 2023-04-06 VITALS
HEIGHT: 71 IN | DIASTOLIC BLOOD PRESSURE: 70 MMHG | SYSTOLIC BLOOD PRESSURE: 110 MMHG | RESPIRATION RATE: 20 BRPM | WEIGHT: 180 LBS | BODY MASS INDEX: 25.2 KG/M2 | HEART RATE: 72 BPM

## 2023-04-06 DIAGNOSIS — N99.72 BLADDER PERFORATION, INTRAOPERATIVE: Primary | ICD-10-CM

## 2023-04-06 NOTE — PROGRESS NOTES
"4/6/2023    Didi Ott is a 65 y o  male  [id number]    Diagnosis:  Chief Complaint    Bladder Injury         Patient presents for montejo removal s/p repair complex incisional hernia, bladder perforation repair  on 3/22/2023 with Dr Cheung Bold:  Follow up prn with Urology    Procedure: Montejo removed after deflation of intact balloon without   Incident  Patient tolerated procedure well      Vitals:    04/06/23 0936   BP: 110/70   Pulse: 72   Resp: 20   Weight: 81 6 kg (180 lb)   Height: 5' 11\" (1 803 m)         Benson Swift RN  "

## 2023-04-13 ENCOUNTER — HOSPITAL ENCOUNTER (INPATIENT)
Facility: HOSPITAL | Age: 58
LOS: 2 days | Discharge: HOME HEALTH | End: 2023-04-15
Attending: EMERGENCY MEDICINE | Admitting: SURGERY

## 2023-04-13 ENCOUNTER — APPOINTMENT (INPATIENT)
Dept: RADIOLOGY | Facility: HOSPITAL | Age: 58
End: 2023-04-13

## 2023-04-13 DIAGNOSIS — D72.829 LEUKOCYTOSIS: ICD-10-CM

## 2023-04-13 DIAGNOSIS — N99.72 BLADDER PERFORATION, INTRAOPERATIVE: ICD-10-CM

## 2023-04-13 DIAGNOSIS — R10.84 GENERALIZED ABDOMINAL PAIN: ICD-10-CM

## 2023-04-13 DIAGNOSIS — R18.8 ABDOMINAL WALL FLUID COLLECTIONS: Primary | ICD-10-CM

## 2023-04-13 LAB
ALBUMIN SERPL BCP-MCNC: 4 G/DL (ref 3.5–5)
ALP SERPL-CCNC: 56 U/L (ref 34–104)
ALT SERPL W P-5'-P-CCNC: 9 U/L (ref 7–52)
ANION GAP SERPL CALCULATED.3IONS-SCNC: 7 MMOL/L (ref 4–13)
APTT PPP: 29 SECONDS (ref 23–37)
AST SERPL W P-5'-P-CCNC: 12 U/L (ref 13–39)
BACTERIA UR QL AUTO: ABNORMAL /HPF
BASOPHILS # BLD AUTO: 0.05 THOUSANDS/ΜL (ref 0–0.1)
BASOPHILS NFR BLD AUTO: 0 % (ref 0–1)
BILIRUB DIRECT SERPL-MCNC: 0.16 MG/DL (ref 0–0.2)
BILIRUB SERPL-MCNC: 0.91 MG/DL (ref 0.2–1)
BILIRUB UR QL STRIP: ABNORMAL
BUN SERPL-MCNC: 11 MG/DL (ref 5–25)
CALCIUM SERPL-MCNC: 9.7 MG/DL (ref 8.4–10.2)
CHLORIDE SERPL-SCNC: 101 MMOL/L (ref 96–108)
CLARITY UR: CLEAR
CO2 SERPL-SCNC: 27 MMOL/L (ref 21–32)
COLOR UR: ABNORMAL
CREAT FLD-MCNC: 0.66 MG/DL
CREAT SERPL-MCNC: 0.72 MG/DL (ref 0.6–1.3)
EOSINOPHIL # BLD AUTO: 0.11 THOUSAND/ΜL (ref 0–0.61)
EOSINOPHIL NFR BLD AUTO: 1 % (ref 0–6)
ERYTHROCYTE [DISTWIDTH] IN BLOOD BY AUTOMATED COUNT: 11.6 % (ref 11.6–15.1)
GFR SERPL CREATININE-BSD FRML MDRD: 104 ML/MIN/1.73SQ M
GLUCOSE SERPL-MCNC: 101 MG/DL (ref 65–140)
GLUCOSE UR STRIP-MCNC: ABNORMAL MG/DL
HCT VFR BLD AUTO: 42.2 % (ref 36.5–49.3)
HGB BLD-MCNC: 14.8 G/DL (ref 12–17)
HGB UR QL STRIP.AUTO: ABNORMAL
IMM GRANULOCYTES # BLD AUTO: 0.07 THOUSAND/UL (ref 0–0.2)
IMM GRANULOCYTES NFR BLD AUTO: 1 % (ref 0–2)
INR PPP: 1.4 (ref 0.84–1.19)
KETONES UR STRIP-MCNC: ABNORMAL MG/DL
LACTATE SERPL-SCNC: 0.8 MMOL/L (ref 0.5–2)
LEUKOCYTE ESTERASE UR QL STRIP: ABNORMAL
LIPASE SERPL-CCNC: 21 U/L (ref 11–82)
LYMPHOCYTES # BLD AUTO: 1.24 THOUSANDS/ΜL (ref 0.6–4.47)
LYMPHOCYTES NFR BLD AUTO: 9 % (ref 14–44)
MAGNESIUM SERPL-MCNC: 2 MG/DL (ref 1.9–2.7)
MCH RBC QN AUTO: 30.9 PG (ref 26.8–34.3)
MCHC RBC AUTO-ENTMCNC: 35.1 G/DL (ref 31.4–37.4)
MCV RBC AUTO: 88 FL (ref 82–98)
MONOCYTES # BLD AUTO: 1.1 THOUSAND/ΜL (ref 0.17–1.22)
MONOCYTES NFR BLD AUTO: 8 % (ref 4–12)
MUCOUS THREADS UR QL AUTO: ABNORMAL
NEUTROPHILS # BLD AUTO: 10.86 THOUSANDS/ΜL (ref 1.85–7.62)
NEUTS SEG NFR BLD AUTO: 81 % (ref 43–75)
NITRITE UR QL STRIP: NEGATIVE
NON-SQ EPI CELLS URNS QL MICRO: ABNORMAL /HPF
NRBC BLD AUTO-RTO: 0 /100 WBCS
PH UR STRIP.AUTO: 6.5 [PH]
PLATELET # BLD AUTO: 231 THOUSANDS/UL (ref 149–390)
PMV BLD AUTO: 9.7 FL (ref 8.9–12.7)
POTASSIUM SERPL-SCNC: 3.8 MMOL/L (ref 3.5–5.3)
PROT SERPL-MCNC: 8 G/DL (ref 6.4–8.4)
PROT UR STRIP-MCNC: ABNORMAL MG/DL
PROTHROMBIN TIME: 17.1 SECONDS (ref 11.6–14.5)
RBC # BLD AUTO: 4.79 MILLION/UL (ref 3.88–5.62)
RBC #/AREA URNS AUTO: ABNORMAL /HPF
SODIUM SERPL-SCNC: 135 MMOL/L (ref 135–147)
SP GR UR STRIP.AUTO: 1.02 (ref 1–1.03)
UROBILINOGEN UR QL STRIP.AUTO: >=8 E.U./DL
WBC # BLD AUTO: 13.43 THOUSAND/UL (ref 4.31–10.16)
WBC #/AREA URNS AUTO: ABNORMAL /HPF

## 2023-04-13 PROCEDURE — 0W9F30Z DRAINAGE OF ABDOMINAL WALL WITH DRAINAGE DEVICE, PERCUTANEOUS APPROACH: ICD-10-PCS | Performed by: RADIOLOGY

## 2023-04-13 RX ORDER — CEFAZOLIN SODIUM 1 G/3ML
INJECTION, POWDER, FOR SOLUTION INTRAMUSCULAR; INTRAVENOUS AS NEEDED
Status: DISCONTINUED | OUTPATIENT
Start: 2023-04-13 | End: 2023-04-15 | Stop reason: HOSPADM

## 2023-04-13 RX ORDER — KETOROLAC TROMETHAMINE 30 MG/ML
15 INJECTION, SOLUTION INTRAMUSCULAR; INTRAVENOUS EVERY 6 HOURS PRN
Status: DISCONTINUED | OUTPATIENT
Start: 2023-04-13 | End: 2023-04-15 | Stop reason: HOSPADM

## 2023-04-13 RX ORDER — HYDROMORPHONE HCL/PF 1 MG/ML
0.5 SYRINGE (ML) INJECTION ONCE
Status: COMPLETED | OUTPATIENT
Start: 2023-04-13 | End: 2023-04-13

## 2023-04-13 RX ORDER — NICOTINE 21 MG/24HR
1 PATCH, TRANSDERMAL 24 HOURS TRANSDERMAL DAILY
Status: DISCONTINUED | OUTPATIENT
Start: 2023-04-13 | End: 2023-04-15 | Stop reason: HOSPADM

## 2023-04-13 RX ORDER — SODIUM CHLORIDE 9 MG/ML
10 INJECTION INTRAVENOUS DAILY
Qty: 300 ML | Refills: 0 | Status: SHIPPED | OUTPATIENT
Start: 2023-04-13 | End: 2023-04-20 | Stop reason: SDUPTHER

## 2023-04-13 RX ORDER — MIDAZOLAM HYDROCHLORIDE 2 MG/2ML
INJECTION, SOLUTION INTRAMUSCULAR; INTRAVENOUS AS NEEDED
Status: DISCONTINUED | OUTPATIENT
Start: 2023-04-13 | End: 2023-04-15 | Stop reason: HOSPADM

## 2023-04-13 RX ORDER — FENTANYL CITRATE 50 UG/ML
INJECTION, SOLUTION INTRAMUSCULAR; INTRAVENOUS AS NEEDED
Status: DISCONTINUED | OUTPATIENT
Start: 2023-04-13 | End: 2023-04-15 | Stop reason: HOSPADM

## 2023-04-13 RX ORDER — ACETAMINOPHEN 325 MG/1
650 TABLET ORAL EVERY 6 HOURS PRN
Status: DISCONTINUED | OUTPATIENT
Start: 2023-04-13 | End: 2023-04-15 | Stop reason: HOSPADM

## 2023-04-13 RX ORDER — CEFAZOLIN SODIUM 2 G/50ML
2000 SOLUTION INTRAVENOUS EVERY 8 HOURS
Status: DISCONTINUED | OUTPATIENT
Start: 2023-04-13 | End: 2023-04-15 | Stop reason: HOSPADM

## 2023-04-13 RX ORDER — METRONIDAZOLE 500 MG/100ML
500 INJECTION, SOLUTION INTRAVENOUS EVERY 8 HOURS
Status: DISCONTINUED | OUTPATIENT
Start: 2023-04-13 | End: 2023-04-15

## 2023-04-13 RX ORDER — SODIUM CHLORIDE, SODIUM LACTATE, POTASSIUM CHLORIDE, CALCIUM CHLORIDE 600; 310; 30; 20 MG/100ML; MG/100ML; MG/100ML; MG/100ML
125 INJECTION, SOLUTION INTRAVENOUS CONTINUOUS
Status: DISCONTINUED | OUTPATIENT
Start: 2023-04-13 | End: 2023-04-14

## 2023-04-13 RX ORDER — HEPARIN SODIUM 5000 [USP'U]/ML
5000 INJECTION, SOLUTION INTRAVENOUS; SUBCUTANEOUS EVERY 8 HOURS SCHEDULED
Status: DISCONTINUED | OUTPATIENT
Start: 2023-04-13 | End: 2023-04-15 | Stop reason: HOSPADM

## 2023-04-13 RX ORDER — CEFAZOLIN SODIUM 2 G/50ML
2000 SOLUTION INTRAVENOUS ONCE
Status: COMPLETED | OUTPATIENT
Start: 2023-04-13 | End: 2023-04-13

## 2023-04-13 RX ADMIN — CEFAZOLIN 1000 MG: 1 INJECTION, POWDER, FOR SOLUTION INTRAMUSCULAR; INTRAVENOUS at 15:50

## 2023-04-13 RX ADMIN — FENTANYL CITRATE 50 MCG: 50 INJECTION INTRAMUSCULAR; INTRAVENOUS at 16:08

## 2023-04-13 RX ADMIN — HYDROMORPHONE HYDROCHLORIDE 0.5 MG: 1 INJECTION, SOLUTION INTRAMUSCULAR; INTRAVENOUS; SUBCUTANEOUS at 11:04

## 2023-04-13 RX ADMIN — CEFAZOLIN SODIUM 2000 MG: 2 SOLUTION INTRAVENOUS at 10:38

## 2023-04-13 RX ADMIN — MIDAZOLAM 1 MG: 1 INJECTION INTRAMUSCULAR; INTRAVENOUS at 16:01

## 2023-04-13 RX ADMIN — FENTANYL CITRATE 50 MCG: 50 INJECTION INTRAMUSCULAR; INTRAVENOUS at 16:02

## 2023-04-13 RX ADMIN — MIDAZOLAM 1 MG: 1 INJECTION INTRAMUSCULAR; INTRAVENOUS at 15:55

## 2023-04-13 RX ADMIN — METRONIDAZOLE 500 MG: 500 INJECTION, SOLUTION INTRAVENOUS at 17:09

## 2023-04-13 RX ADMIN — KETOROLAC TROMETHAMINE 15 MG: 30 INJECTION, SOLUTION INTRAMUSCULAR; INTRAVENOUS at 13:24

## 2023-04-13 RX ADMIN — SODIUM CHLORIDE 1000 ML: 0.9 INJECTION, SOLUTION INTRAVENOUS at 10:36

## 2023-04-13 RX ADMIN — HEPARIN SODIUM 5000 UNITS: 5000 INJECTION INTRAVENOUS; SUBCUTANEOUS at 20:41

## 2023-04-13 RX ADMIN — FENTANYL CITRATE 50 MCG: 50 INJECTION INTRAMUSCULAR; INTRAVENOUS at 15:55

## 2023-04-13 RX ADMIN — FENTANYL CITRATE 50 MCG: 50 INJECTION INTRAMUSCULAR; INTRAVENOUS at 15:58

## 2023-04-13 RX ADMIN — CEFAZOLIN SODIUM 2000 MG: 2 SOLUTION INTRAVENOUS at 18:58

## 2023-04-13 RX ADMIN — MIDAZOLAM 1 MG: 1 INJECTION INTRAMUSCULAR; INTRAVENOUS at 16:07

## 2023-04-13 RX ADMIN — SODIUM CHLORIDE, SODIUM LACTATE, POTASSIUM CHLORIDE, AND CALCIUM CHLORIDE 125 ML/HR: .6; .31; .03; .02 INJECTION, SOLUTION INTRAVENOUS at 11:44

## 2023-04-13 RX ADMIN — HEPARIN SODIUM 5000 UNITS: 5000 INJECTION INTRAVENOUS; SUBCUTANEOUS at 13:24

## 2023-04-13 RX ADMIN — MIDAZOLAM 1 MG: 1 INJECTION INTRAMUSCULAR; INTRAVENOUS at 15:58

## 2023-04-13 NOTE — ED PROVIDER NOTES
History  Chief Complaint   Patient presents with   • Abdominal Pain     Pt has hx of abscess/wound dehiscence from suprapubic removal and mesh repair of bladder  Was supposed to be admitted yesterday but left AMA  Pain increasingly worse so pt is back     64 YO male returns to the ED for admission  Patient has a recent history of ventral hernia repair complicated by bladder perforation  He was discharged after this procedure and subsequent treatment with a Saleem catheter which had been removed 7 days ago  States he developed a focal area of central abdominal pain after removal of the catheter  Patient was evaluated in the ED yesterday for this, found to have a fluid collection in the rectus muscle with concern for hematoma, seroma or possible urinoma, additionally noted to have cystitis  Patient was evaluated by surgery in the ED yesterday and offered admission for IV antibiotics and likely drainage of collection with interventional radiology  He did decline this admission  States that, since leaving yesterday the pain has worsened and he has developed an erythematous area over the anterior wall of the abodomen  He has felt feverish  Patient has come back to be admitted  Pt denies CP/SOB/N/V/D/C, no dysuria, burning on urination or blood in urine  History provided by:  Patient and medical records   used: No        Prior to Admission Medications   Prescriptions Last Dose Informant Patient Reported? Taking?    cephalexin (KEFLEX) 500 mg capsule 4/13/2023  No Yes   Sig: Take 1 capsule (500 mg total) by mouth every 6 (six) hours for 10 days   methocarbamol (ROBAXIN) 750 mg tablet   No No   Sig: Take 1 tablet (750 mg total) by mouth every 6 (six) hours for 7 days   Patient not taking: Reported on 3/25/2023      Facility-Administered Medications: None       Past Medical History:   Diagnosis Date   • Asthma    • Bladder injury    • Bronchitis    • COPD (chronic obstructive pulmonary disease) [facility])    • COVID 10/2021   • Hepatitis C    • Infectious viral hepatitis    • Liver disease    • Pneumonia    • Tinnitus        Past Surgical History:   Procedure Laterality Date   • COLONOSCOPY W/ POLYPECTOMY  11/22/2022    6 MONTHS = MUST   • COLOSTOMY N/A 09/26/2022    Procedure: COLOSTOMY END;  Surgeon: Areli Lock MD;  [facility];  Service: General   • COLOSTOMY N/A 09/26/2022    Procedure: COLOSTOMY LOOP;  Surgeon: Areli Lock MD;  [facility];  Service: General   • FL CYSTOGRAM  4/5/2023   • INCISIONAL HERNIA REPAIR N/A 3/22/2023    Procedure: REPAIR COMPLEX INCISIONAL HERNIA  LAPAROSCOPIC WITH MESH, ROBOTIC ASSISTED, AND BLADDER PERFERATION REPAIR;  Surgeon: Areli Lock MD;  [facility];  Service: General   • KNEE ARTHROSCOPY Left    • LAPAROTOMY N/A 09/26/2022    Procedure: LAPAROTOMY EXPLORATORY, LYSIS OF ADHESIONS, LOW ANTERIOR RESECTION WITH PRIMARY ANASTOMOSIS;  Surgeon: Areli Lock MD;  [facility];  Service: General   • MOUTH SURGERY     • NM CLOSURE ENTEROSTOMY LG/SMALL INTESTINE N/A 11/23/2022    Procedure: CLOSURE COLOSTOMY, LYSIS OF ADEHSIONS, RIGID SIGMOIDOSCOPY;  Surgeon: Areli Lock MD;  [facility];  Service: General   • NM COLECTOMY PARTIAL W/ANASTOMOSIS N/A 09/26/2022    Procedure: RESECTION COLON SIGMOID;  Surgeon: Areli Lock MD;  [facility];  Service: General   • NM [address] RGD DX W/WO [facility] [place] [facility] [address]/ [facility] N/A 09/26/2022    Procedure: SIGMOIDOSCOPY RIGID;  Surgeon: Areli Lock MD;  [facility];  Service: General       Family History   Problem Relation Age of Onset   • Diabetes Father    • Heart disease Mother      I have reviewed and agree with the history as documented      E-Cigarette/Vaping   • E-Cigarette Use Never User      E-Cigarette/Vaping Substances     Social History     Tobacco Use   • Smoking status: Every Day     Packs/day: 0 25     Types: Cigarettes   • Smokeless tobacco: Never   • Tobacco comments:     Last smoked today 3 2 23   Vaping Use   • Vaping Use: Never used   Substance Use Topics   • Alcohol use: Not Currently   • Drug use: Not Currently     Types: Marijuana, Heroin, Methamphetamines, Fentanyl     Comment: nothing since 12/20/2020       Review of Systems   Constitutional: Negative for fever  HENT: Negative for dental problem  Eyes: Negative for visual disturbance  Respiratory: Negative for shortness of breath  Cardiovascular: Negative for chest pain  Gastrointestinal: Positive for abdominal pain  Negative for nausea and vomiting  Genitourinary: Negative for dysuria and frequency  Musculoskeletal: Negative for neck pain and neck stiffness  Skin: Positive for rash  Neurological: Negative for dizziness, weakness and light-headedness  Psychiatric/Behavioral: Negative for agitation, behavioral problems and confusion  All other systems reviewed and are negative  Physical Exam  Physical Exam  Vitals and nursing note reviewed  Constitutional:       Appearance: He is well-developed  HENT:      Head: Normocephalic and atraumatic  Eyes:      Extraocular Movements: Extraocular movements intact  Cardiovascular:      Rate and Rhythm: Normal rate  Pulmonary:      Effort: Pulmonary effort is normal    Abdominal:      General: Abdomen is flat  There is no distension  Tenderness: There is abdominal tenderness  Comments: Area of erythema and warmth over the anterior abdomen with skin breakdown  Musculoskeletal:         General: Normal range of motion  Cervical back: Normal range of motion  Skin:     Findings: No rash  Neurological:      Mental Status: He is alert and oriented to person, place, and time     Psychiatric:         Behavior: Behavior normal          Vital Signs  ED Triage Vitals [04/13/23 1001]   Temperature Pulse Respirations Blood Pressure SpO2   97 9 °F (36 6 °C) 89 22 114/73 99 %      Temp Source Heart Rate Source Patient Position - Orthostatic VS BP Location FiO2 (%)   Oral Monitor Lying Left arm --      Pain Score       10 - Worst Possible Pain           Vitals:    04/13/23 1610 04/13/23 1615 04/13/23 1620 04/13/23 1646   BP: 99/66 94/62  97/62   Pulse: 85 75 74 71   Patient Position - Orthostatic VS:             Visual Acuity      ED Medications  Medications   lactated ringers infusion (125 mL/hr Intravenous New Bag 4/13/23 1144)   nicotine (NICODERM CQ) 14 mg/24hr TD 24 hr patch 1 patch (1 patch Transdermal Not Given 4/13/23 1145)   heparin (porcine) subcutaneous injection 5,000 Units (5,000 Units Subcutaneous Given 4/13/23 1324)   ceFAZolin (ANCEF) IVPB (premix in dextrose) 2,000 mg 50 mL (has no administration in time range)   ketorolac (TORADOL) injection 15 mg (15 mg Intravenous Given 4/13/23 1324)   acetaminophen (TYLENOL) tablet 650 mg (has no administration in time range)   midazolam (VERSED) injection (1 mg Intravenous Given 4/13/23 1607)   fentanyl citrate (PF) 100 MCG/2ML (50 mcg Intravenous Given 4/13/23 1608)   ceFAZolin (ANCEF) injection (1,000 mg Intravenous Given 4/13/23 1550)   metroNIDAZOLE (FLAGYL) IVPB (premix) 500 mg 100 mL (500 mg Intravenous New Bag 4/13/23 1709)   sodium chloride 0 9 % bolus 1,000 mL (0 mL Intravenous Stopped 4/13/23 1145)   ceFAZolin (ANCEF) IVPB (premix in dextrose) 2,000 mg 50 mL (0 mg Intravenous Stopped 4/13/23 1108)   HYDROmorphone (DILAUDID) injection 0 5 mg (0 5 mg Intravenous Given 4/13/23 1104)       Diagnostic Studies  Results Reviewed     Procedure Component Value Units Date/Time    Blood culture #1 [[id number]] Collected: 04/13/23 1033    Lab Status: Preliminary result Specimen: Blood from Arm, Left Updated: 04/13/23 1801     Blood Culture Received in Microbiology Lab  Culture in Progress      Blood culture #2 [[id number]] Collected: 04/13/23 1035    Lab Status: Preliminary result Specimen: Blood from Hand, Left Updated: 04/13/23 1801     Blood Culture Received in Microbiology Lab  Culture in Progress  Lactic acid, plasma (w/reflex if result > 2 0) [[id number]]  (Normal) Collected: 04/13/23 1033    Lab Status: Final result Specimen: Blood from Arm, Left Updated: 04/13/23 1131     LACTIC ACID 0 8 mmol/L     Narrative:      Result may be elevated if tourniquet was used during collection  Urine Microscopic [[id number]]  (Abnormal) Collected: 04/13/23 1044    Lab Status: Final result Specimen: Urine, Indwelling Saleem Catheter Updated: 04/13/23 1117     RBC, UA 1-2 /hpf      WBC, UA 10-20 /hpf      Epithelial Cells None Seen /hpf      Bacteria, UA Occasional /hpf      MUCUS THREADS Occasional    Urine culture [[id number]] Collected: 04/13/23 1044    Lab Status:  In process Specimen: Urine, Indwelling Saleem Catheter Updated: 04/13/23 1117    UA w Reflex to Microscopic w Reflex to Culture [[id number]]  (Abnormal) Collected: 04/13/23 1044    Lab Status: Final result Specimen: Urine, Indwelling Saleem Catheter Updated: 04/13/23 1110     Color, UA [place]     Clarity, UA Clear     Specific [place], UA 1 020     pH, UA 6 5     Leukocytes, UA Trace     Nitrite, UA Negative     Protein, UA 30 (1+) mg/dl      Glucose,  (1/10%) mg/dl      Ketones, UA Trace mg/dl      Urobilinogen, UA >=8 0 E U /dl      Bilirubin, UA Small     Occult Blood, UA Trace-Intact    Basic metabolic panel [[id number]] Collected: 04/13/23 1033    Lab Status: Final result Specimen: Blood from Arm, Left Updated: 04/13/23 1106     Sodium 135 mmol/L      Potassium 3 8 mmol/L      Chloride 101 mmol/L      CO2 27 mmol/L      ANION GAP 7 mmol/L      BUN 11 mg/dL      Creatinine 0 72 mg/dL      Glucose 101 mg/dL      Calcium 9 7 mg/dL      eGFR 104 ml/min/1 73sq m     Narrative:      [place] guidelines for Chronic Kidney Disease (CKD):   •  Stage 1 with normal or high GFR (GFR > 90 mL/min/1 73 square meters)  •  Stage 2 Mild CKD (GFR = 60-89 mL/min/1 73 square meters)  •  Stage 3A Moderate CKD (GFR = 45-59 mL/min/1 73 square meters)  •  Stage 3B Moderate CKD (GFR = 30-44 mL/min/1 73 square meters)  •  Stage 4 Severe CKD (GFR = 15-29 mL/min/1 73 square meters)  •  Stage 5 End Stage CKD (GFR <15 mL/min/1 73 square meters)  Note: GFR calculation is accurate only with a steady state creatinine    Hepatic function panel [[id number]]  (Abnormal) Collected: 04/13/23 1033    Lab Status: Final result Specimen: Blood from Arm, Left Updated: 04/13/23 1106     Total Bilirubin 0 91 mg/dL      Bilirubin, Direct 0 16 mg/dL      Alkaline Phosphatase 56 U/L      AST 12 U/L      ALT 9 U/L      Total Protein 8 0 g/dL      Albumin 4 0 g/dL     Magnesium [[id number]]  (Normal) Collected: 04/13/23 1033    Lab Status: Final result Specimen: Blood from Arm, Left Updated: 04/13/23 1106     Magnesium 2 0 mg/dL     Lipase [[id number]]  (Normal) Collected: 04/13/23 1033    Lab Status: Final result Specimen: Blood from Arm, Left Updated: 04/13/23 1106     Lipase 21 u/L     Protime-INR [[id number]]  (Abnormal) Collected: 04/13/23 1033    Lab Status: Final result Specimen: Blood from Arm, Left Updated: 04/13/23 1104     Protime 17 1 seconds      INR 1 40    APTT [[id number]]  (Normal) Collected: 04/13/23 1033    Lab Status: Final result Specimen: Blood from Arm, Left Updated: 04/13/23 1104     PTT 29 seconds     CBC and differential [[id number]]  (Abnormal) Collected: 04/13/23 1033    Lab Status: Final result Specimen: Blood from Arm, Left Updated: 04/13/23 1048     WBC 13 43 Thousand/uL      RBC 4 79 Million/uL      Hemoglobin 14 8 g/dL      Hematocrit 42 2 %      MCV 88 fL      MCH 30 9 pg      MCHC 35 1 g/dL      RDW 11 6 %      MPV 9 7 fL      Platelets 041 Thousands/uL      nRBC 0 /100 WBCs      Neutrophils Relative 81 %      Immat GRANS % 1 %      Lymphocytes Relative 9 %      Monocytes Relative 8 %      Eosinophils Relative 1 %      Basophils Relative 0 %      Neutrophils Absolute 10 86 Thousands/µL      Immature Grans Absolute 0 07 Thousand/uL      Lymphocytes Absolute 1 24 Thousands/µL      Monocytes Absolute 1 10 Thousand/µL      Eosinophils Absolute 0 11 Thousand/µL      Basophils Absolute 0 05 Thousands/µL                  IR drainage tube placement    (Results Pending)   IR drainage tube check/change/reposition/reinsertion/upsize    (Results Pending)              Procedures  Procedures         ED Course  ED Course as of 04/13/23 1829   Thu Apr 13, 2023   1053 WBC(!): 13 43  11 34 yesterday  SBIRT 22yo+    Flowsheet Row Most Recent Value   Initial Alcohol Screen: US AUDIT-C     1  How often do you have a drink containing alcohol? 0 Filed at: 04/13/2023 1006   2  How many drinks containing alcohol do you have on a typical day you are drinking? 0 Filed at: 04/13/2023 1006   3a  Male UNDER 65: How often do you have five or more drinks on one occasion? 0 Filed at: 04/13/2023 1006   Audit-C Score 0 Filed at: 04/13/2023 1006   BREANNA: How many times in the past year have you    Used an illegal drug or used a prescription medication for non-medical reasons? Never Filed at: 04/13/2023 1006                    Medical Decision Making  1  Abdominal pain - Patient with imaging from yesterday which displays a fluid collection in the rectus muscle  Reviewed notes from yesterday, will recheck labs, start Ancef, speak with surgery for admission  Abdominal wall fluid collections: acute illness or injury  Leukocytosis: acute illness or injury  Amount and/or Complexity of Data Reviewed  External Data Reviewed: labs and notes  Labs: ordered  Decision-making details documented in ED Course  Discussion of management or test interpretation with external provider(s): Discussed with surgery, they will be admitting the patient  Risk  Prescription drug management  Decision regarding hospitalization            Disposition  Final diagnoses:   Abdominal wall fluid collections   Leukocytosis     Time reflects when diagnosis was documented in both MDM as applicable and the Disposition within this note     Time User Action Codes Description Comment    4/13/2023 11:00 AM Sandre Poplin E Add [R18 8] Abdominal wall fluid collections     4/13/2023 11:00 AM Sandre Poplin E Add [D72 829] Leukocytosis     4/13/2023  3:58 PM Em Frizzle Modify [R18 8] Abdominal wall fluid collections     4/13/2023  3:58 PM Em Frizzle Modify [U30 755] Leukocytosis     4/13/2023  4:33 PM Lisbeth Lacy Modify [R18 8] Abdominal wall fluid collections     4/13/2023  4:33 PM Lisbeth Lacy Modify [Z49 162] Leukocytosis       ED Disposition     ED Disposition   Admit    Condition   Stable    Date/Time   Thu Apr 13, 2023 11:00 AM    Comment   Case was discussed with General Surgery and the patient's admission status was agreed to be Admission Status: inpatient status to the service of Dr Nadia Martinez   Follow-up Information    None         Current Discharge Medication List      START taking these medications    Details   sodium chloride, PF, 0 9 % 10 mL by Intracatheter route daily for 120 doses Intracatheter flushing daily  May substitute prefilled syringe with normal saline 10 mL vials, 10 mL syringes, and 18 g blunt needles  Qty: 300 mL, Refills: 0    Associated Diagnoses: Abdominal wall fluid collections         CONTINUE these medications which have NOT CHANGED    Details   cephalexin (KEFLEX) 500 mg capsule Take 1 capsule (500 mg total) by mouth every 6 (six) hours for 10 days  Qty: 40 capsule, Refills: 0    Associated Diagnoses: Abdominal fluid collection      methocarbamol (ROBAXIN) 750 mg tablet Take 1 tablet (750 mg total) by mouth every 6 (six) hours for 7 days  Qty: 28 tablet, Refills: 0    Associated Diagnoses: Incisional hernia, without obstruction or gangrene             Outpatient Discharge Orders   IR drainage tube check/change/reposition/reinsertion/upsize   Standing Status: Future Standing Exp   Date: 04/13/27     Drainage Tube Home Care - (Does not include  Asept/Tenkhoff/PD catheters and G/GJ tubes)       PDMP Review       Value Time User    PDMP Reviewed  Yes 9/29/2022  8:45 AM Keke King PA-C          ED Provider  Electronically Signed by           Jordan Haynes MD  04/13/23 8802

## 2023-04-13 NOTE — TELEMEDICINE
e-Consult (IPC)  - Interventional Radiology  Gorge Hope 65 y o  male MRN: [id number]  Unit/Bed#: ED-41 Encounter: [id number]          Interventional Radiology has been consulted to evaluate Gorge Hope    We were consulted by Surgery concerning this patient with concern for urinoma within abdominal wall s/p ventral hernia repair with mesh with intra-operative bladder injury  Initial procedure was done on 3/22  Bladder injury was repaired at the time of procedure  Patient has subsequently visited the ED for post operative concerns including a montejo catheter issue and most recently with fevers  Patient was admitted for further management  Recent CT imaging shows potential urinoma  Inpatient Consult to IR  Consult performed by: Randi Eckert PA-C  Consult ordered by: Zehra Han DO        04/13/23    Assessment/Recommendation:     1  Abnormal CT Scan, Concern for Urinoma vs Other Fluid Collection  - tentative plan for drainage/fluid aspiration, send for labs including culture and creatinine  - Will review mesh placement with surgery team prior to drainage  - maintain NPO status, may require sedation  - reviewed with Dr Sherren Evener        11-20 minutes, >50% of the total time devoted to medical consultative verbal/EMR discussion between providers  Written report will be generated in the EMR  Thank you for allowing Interventional Radiology to participate in the care of Gorge Hope  Please don't hesitate to call or TigerText us with any questions       Randi Eckert PA-C

## 2023-04-13 NOTE — PLAN OF CARE
Problem: Potential for Falls  Goal: Patient will remain free of falls  Description: INTERVENTIONS:  - Educate patient/family on patient safety including physical limitations  - Instruct patient to call for assistance with activity   - Consult OT/PT to assist with strengthening/mobility   - Keep Call bell within reach  - Keep bed low and locked with side rails adjusted as appropriate  - Keep care items and personal belongings within reach  - Initiate and maintain comfort rounds  - Make Fall Risk Sign visible to staff  - Offer Toileting every 2 Hours, in advance of need  - Initiate/Maintain bed  alarm  - Obtain necessary fall risk management equipment:   - Apply yellow socks and bracelet for high fall risk patients  - Consider moving patient to room near nurses station  Outcome: Progressing     Problem: PAIN - ADULT  Goal: Verbalizes/displays adequate comfort level or baseline comfort level  Description: Interventions:  - Encourage patient to monitor pain and request assistance  - Assess pain using appropriate pain scale  - Administer analgesics based on type and severity of pain and evaluate response  - Implement non-pharmacological measures as appropriate and evaluate response  - Consider cultural and social influences on pain and pain management  - Notify physician/advanced practitioner if interventions unsuccessful or patient reports new pain  Outcome: Progressing     Problem: INFECTION - ADULT  Goal: Absence or prevention of progression during hospitalization  Description: INTERVENTIONS:  - Assess and monitor for signs and symptoms of infection  - Monitor lab/diagnostic results  - Monitor all insertion sites, i e  indwelling lines, tubes, and drains  - Monitor endotracheal if appropriate and nasal secretions for changes in amount and color  - [place] appropriate cooling/warming therapies per order  - Administer medications as ordered  - Instruct and encourage patient and family to use good hand hygiene technique  - Identify and instruct in appropriate isolation precautions for identified infection/condition  Outcome: Progressing

## 2023-04-13 NOTE — H&P
H&P Exam - General Surgery   Tawana High 65 y o  male MRN: [id number]  Unit/Bed#: ED-41 Encounter: [id number]    Assessment/Plan     Assessment:  65 y  o  M who is s/p ventral hernia repair with intra-operative bladder injury repair on 3/22, presents with evidence of an extraperitoneal bladder injury and signs concerning for a urinoma in the abdominal wall  Afebrile here  Vital signs stable  WBC    4/12 CTAP: 11 3x4 8x7 6cm fluid collection int he abdominal wall tracking toward thickened bladder dome  No intraperitoneal extension  Plan: Will admit to surgical service   Montejo catheter placement for bladder drainage  IR consult for consideration of percutaneous drain placement into the abdominal wall fluid collection + obtain cultures   IV abx   NPO/IVFs  Prn analgesia, avoiding opioids      History of Present Illness   HPI:  Tawana High is a 65 y o  male with PMHx of polysubstance abuse, hepatitis C, COPD, who is s/p ventral hernia repair with intra-operative bladder repair on 3/22, who initially presented yesterday complaining of worsening lower abdominal pain since his montejo catheter was removed on 4/6/23  ED workup revealed an extraperitoneal fluid collection concerning for a urinoma and signs concerning for a bladder leak  He was recommended admission for IV abx, montejo placement, and IR consult for drain placement, however patient left AMA  He returns to the ER today complaining of worsening pain and redness overlying this extraperitoneal fluid collection  He has had persistent fevers are home for the last week, T max 102  No nausea or vomiting  Denies any other complaints  He has been voiding spontaneously without issue  The character of his urine has been normal        Review of Systems   Constitutional: Positive for fever  Negative for chills  HENT: Negative for ear pain and sore throat  Eyes: Negative for pain and visual disturbance     Respiratory: Negative for cough and shortness of breath  Cardiovascular: Negative for chest pain and palpitations  Gastrointestinal: Positive for abdominal pain  Negative for nausea and vomiting  Genitourinary: Negative for dysuria and hematuria  Musculoskeletal: Negative for arthralgias and back pain  Skin: Positive for wound (lower abdomen)  Negative for color change and rash  Neurological: Negative for seizures and syncope  All other systems reviewed and are negative        Historical Information   Past Medical History:   Diagnosis Date   • Asthma    • Bladder injury    • Bronchitis    • COPD (chronic obstructive pulmonary disease) ([facility] [address] )    • COVID 10/2021   • Hepatitis C    • Infectious viral hepatitis    • Liver disease    • Pneumonia    • Tinnitus      Past Surgical History:   Procedure Laterality Date   • COLONOSCOPY W/ POLYPECTOMY  11/22/2022    6 MONTHS = MUST   • COLOSTOMY N/A 09/26/2022    Procedure: COLOSTOMY END;  Surgeon: Gregg Chapman MD;  [facility];  Service: General   • COLOSTOMY N/A 09/26/2022    Procedure: COLOSTOMY LOOP;  Surgeon: Gregg Chapman MD;  [facility];  Service: General   • FL CYSTOGRAM  4/5/2023   • INCISIONAL HERNIA REPAIR N/A 3/22/2023    Procedure: REPAIR COMPLEX INCISIONAL HERNIA  LAPAROSCOPIC WITH MESH, ROBOTIC ASSISTED, AND BLADDER PERFERATION REPAIR;  Surgeon: Gregg Chapman MD;  [facility];  Service: General   • KNEE ARTHROSCOPY Left    • LAPAROTOMY N/A 09/26/2022    Procedure: LAPAROTOMY EXPLORATORY, LYSIS OF ADHESIONS, LOW ANTERIOR RESECTION WITH PRIMARY ANASTOMOSIS;  Surgeon: Gregg Chapman MD;  [facility];  Service: General   • MOUTH SURGERY     • MO CLOSURE ENTEROSTOMY LG/SMALL INTESTINE N/A 11/23/2022    Procedure: CLOSURE COLOSTOMY, LYSIS OF ADEHSIONS, RIGID SIGMOIDOSCOPY;  Surgeon: Gregg Chapman MD;  [facility];  Service: General   • MO COLECTOMY PARTIAL W/ANASTOMOSIS N/A 09/26/2022    Procedure: RESECTION COLON SIGMOID;  Surgeon: Mliss Claude Gene Hastings MD;  [facility];  Service: General   • UT [address] RGD DX W/WO MAXINEJ Paulina Garg Do [place] [facility] [address]/ [facility] N/A 09/26/2022    Procedure: Gabino Hay;  Surgeon: Lev Samaniego MD;  [facility];  Service: General     Social History   Social History     Substance and Sexual Activity   Alcohol Use Not Currently     Social History     Substance and Sexual Activity   Drug Use Not Currently   • Types: Marijuana, Heroin, Methamphetamines, Fentanyl    Comment: nothing since 12/20/2020     Social History     Tobacco Use   Smoking Status Every Day   • Packs/day: 0 25   • Types: Cigarettes   Smokeless Tobacco Never   Tobacco Comments    Last smoked today 3 2 23     E-Cigarette/Vaping   • E-Cigarette Use Never User      E-Cigarette/Vaping Substances     Family History: non-contributory    Meds/Allergies   PTA meds:   Prior to Admission Medications   Prescriptions Last Dose Informant Patient Reported?  Taking?   acetaminophen (TYLENOL) 325 mg tablet   No No   Sig: Take 3 tablets (975 mg total) by mouth every 8 (eight) hours   Patient not taking: Reported on 4/6/2023   albuterol (PROVENTIL HFA,VENTOLIN HFA) 90 mcg/act inhaler   Yes No   Sig: Inhale 2 puffs every 6 (six) hours as needed for wheezing   Patient not taking: Reported on 4/6/2023   cephalexin (KEFLEX) 500 mg capsule   No No   Sig: Take 1 capsule (500 mg total) by mouth every 6 (six) hours for 10 days   ibuprofen (MOTRIN) 200 mg tablet   No No   Sig: Take 3 tablets (600 mg total) by mouth every 6 (six) hours as needed for mild pain   Patient not taking: Reported on 4/6/2023   methocarbamol (ROBAXIN) 750 mg tablet   No No   Sig: Take 1 tablet (750 mg total) by mouth every 6 (six) hours for 7 days   Patient not taking: Reported on 3/25/2023   oxybutynin (DITROPAN-XL) 10 MG 24 hr tablet   No No   Sig: Take 1 tablet (10 mg total) by mouth daily as needed (bladder spasms secondary to montejo catheter)      Facility-Administered Medications: None     Allergies Allergen Reactions   • Bactrim [Sulfamethoxazole-Trimethoprim] Angioedema   • Ciprofloxacin Angioedema   • Dust Mite Extract Hives   • Sulfa Antibiotics Hives       Objective   First Vitals:   Blood Pressure: 114/73 (04/13/23 1001)  Pulse: 89 (04/13/23 1001)  Temperature: 97 9 °F (36 6 °C) (04/13/23 1001)  Temp Source: Oral (04/13/23 1001)  Respirations: 22 (04/13/23 1001)  Weight - Scale: 79 8 kg (175 lb 14 8 oz) (04/13/23 1001)  SpO2: 99 % (04/13/23 1001)    Current Vitals:   Blood Pressure: 114/73 (04/13/23 1001)  Pulse: 89 (04/13/23 1001)  Temperature: 97 9 °F (36 6 °C) (04/13/23 1001)  Temp Source: Oral (04/13/23 1001)  Respirations: 22 (04/13/23 1001)  Weight - Scale: 79 8 kg (175 lb 14 8 oz) (04/13/23 1001)  SpO2: 99 % (04/13/23 1001)    No intake or output data in the 24 hours ending 04/13/23 1025    Invasive Devices     None                 Physical Exam  Vitals and nursing note reviewed  Constitutional:       General: He is not in acute distress  Appearance: He is well-developed  HENT:      Head: Normocephalic and atraumatic  Eyes:      Conjunctiva/sclera: Conjunctivae normal    Cardiovascular:      Rate and Rhythm: Normal rate  Pulmonary:      Effort: Pulmonary effort is normal  No respiratory distress  Abdominal:      General: There is no distension  Palpations: Abdomen is soft  Tenderness: There is abdominal tenderness (suprapubic tenderness overlying an erythematous, fluctuance and indurated mass  No active drainage)  There is no guarding or rebound  Musculoskeletal:         General: No swelling  Cervical back: Neck supple  Right lower leg: No edema  Left lower leg: No edema  Skin:     General: Skin is warm and dry  Capillary Refill: Capillary refill takes less than 2 seconds  Neurological:      General: No focal deficit present  Mental Status: He is alert and oriented to person, place, and time     Psychiatric:         Mood and Affect: Mood normal          Lab Results:   CBC:   Lab Results   Component Value Date    WBC 13 43 (H) 04/13/2023    HGB 14 8 04/13/2023    HCT 42 2 04/13/2023    MCV 88 04/13/2023     04/13/2023    MCH 30 9 04/13/2023    MCHC 35 1 04/13/2023    RDW 11 6 04/13/2023    MPV 9 7 04/13/2023    NRBC 0 04/13/2023   , CMP:   Lab Results   Component Value Date    SODIUM 133 (L) 04/12/2023    K 4 0 04/12/2023     04/12/2023    CO2 24 04/12/2023    BUN 12 04/12/2023    CREATININE 0 79 04/12/2023    CALCIUM 9 5 04/12/2023    AST 13 04/12/2023    ALT 10 04/12/2023    ALKPHOS 54 04/12/2023    EGFR 100 04/12/2023     Imaging: I have personally reviewed pertinent reports  EKG, Pathology, and Other Studies: I have personally reviewed pertinent reports  Code Status: Prior  Advance Directive and Living Will:      Power of :    POLST:      Counseling / Coordination of Care  Total floor / unit time spent today 30 minutes  Greater than 50% of total time was spent with the patient and / or family counseling and / or coordination of care    A description of the counseling / coordination of care: discussion with patient and surgical team

## 2023-04-13 NOTE — PLAN OF CARE
Problem: Potential for Falls  Goal: Patient will remain free of falls  Description: INTERVENTIONS:  - Educate patient/family on patient safety including physical limitations  - Instruct patient to call for assistance with activity   - Consult OT/PT to assist with strengthening/mobility   - Keep Call bell within reach  - Keep bed low and locked with side rails adjusted as appropriate  - Keep care items and personal belongings within reach  - Initiate and maintain comfort rounds  - Make Fall Risk Sign visible to staff  - Offer Toileting every  Hours, in advance of need  - Initiate/Maintain alarm  - Obtain necessary fall risk management equipment:   - Apply yellow socks and bracelet for high fall risk patients  - Consider moving patient to room near nurses station  Outcome: Progressing     Problem: PAIN - ADULT  Goal: Verbalizes/displays adequate comfort level or baseline comfort level  Description: Interventions:  - Encourage patient to monitor pain and request assistance  - Assess pain using appropriate pain scale  - Administer analgesics based on type and severity of pain and evaluate response  - Implement non-pharmacological measures as appropriate and evaluate response  - Consider cultural and social influences on pain and pain management  - Notify physician/advanced practitioner if interventions unsuccessful or patient reports new pain  Outcome: Progressing     Problem: INFECTION - ADULT  Goal: Absence or prevention of progression during hospitalization  Description: INTERVENTIONS:  - Assess and monitor for signs and symptoms of infection  - Monitor lab/diagnostic results  - Monitor all insertion sites, i e  indwelling lines, tubes, and drains  - Monitor endotracheal if appropriate and nasal secretions for changes in amount and color  - [place] appropriate cooling/warming therapies per order  - Administer medications as ordered  - Instruct and encourage patient and family to use good hand hygiene technique  - Identify and instruct in appropriate isolation precautions for identified infection/condition  Outcome: Progressing

## 2023-04-13 NOTE — BRIEF OP NOTE (RAD/CATH)
IR DRAINAGE TUBE PLACEMENT Procedure Note    PATIENT NAME: Petrona Huang  : 1965  MRN: [id number]    Pre-op Diagnosis:   1  Abdominal wall fluid collections    2  Leukocytosis      Post-op Diagnosis:   1  Abdominal wall fluid collections    2  Leukocytosis        Providers:   BRODIE [address]    Estimated Blood Loss: 5cc    Findings: placement of 10fr into lower abdominal fluid collection 75cc fluid  Appearance changed from cloudy urine appearance to karime purulunce       Specimens: collected and sent for cultures, creatinine    Complications:  None immediate    Anesthesia: conscious sedation    Daniele Sal PA-C     Date: 2023  Time: 4:34 PM

## 2023-04-14 ENCOUNTER — APPOINTMENT (INPATIENT)
Dept: RADIOLOGY | Facility: HOSPITAL | Age: 58
End: 2023-04-14

## 2023-04-14 ENCOUNTER — HOME HEALTH ADMISSION (OUTPATIENT)
Dept: HOME HEALTH SERVICES | Facility: HOME HEALTHCARE | Age: 58
End: 2023-04-14

## 2023-04-14 LAB
ANION GAP SERPL CALCULATED.3IONS-SCNC: 5 MMOL/L (ref 4–13)
BACTERIA UR CULT: NORMAL
BASOPHILS # BLD AUTO: 0.03 THOUSANDS/ΜL (ref 0–0.1)
BASOPHILS NFR BLD AUTO: 0 % (ref 0–1)
BUN SERPL-MCNC: 9 MG/DL (ref 5–25)
CALCIUM SERPL-MCNC: 8 MG/DL (ref 8.4–10.2)
CHLORIDE SERPL-SCNC: 106 MMOL/L (ref 96–108)
CO2 SERPL-SCNC: 26 MMOL/L (ref 21–32)
CREAT SERPL-MCNC: 0.64 MG/DL (ref 0.6–1.3)
EOSINOPHIL # BLD AUTO: 0.34 THOUSAND/ΜL (ref 0–0.61)
EOSINOPHIL NFR BLD AUTO: 4 % (ref 0–6)
ERYTHROCYTE [DISTWIDTH] IN BLOOD BY AUTOMATED COUNT: 11.8 % (ref 11.6–15.1)
GFR SERPL CREATININE-BSD FRML MDRD: 109 ML/MIN/1.73SQ M
GLUCOSE SERPL-MCNC: 101 MG/DL (ref 65–140)
HCT VFR BLD AUTO: 35.5 % (ref 36.5–49.3)
HGB BLD-MCNC: 12.1 G/DL (ref 12–17)
IMM GRANULOCYTES # BLD AUTO: 0.06 THOUSAND/UL (ref 0–0.2)
IMM GRANULOCYTES NFR BLD AUTO: 1 % (ref 0–2)
LYMPHOCYTES # BLD AUTO: 1.27 THOUSANDS/ΜL (ref 0.6–4.47)
LYMPHOCYTES NFR BLD AUTO: 14 % (ref 14–44)
MCH RBC QN AUTO: 30.5 PG (ref 26.8–34.3)
MCHC RBC AUTO-ENTMCNC: 34.1 G/DL (ref 31.4–37.4)
MCV RBC AUTO: 89 FL (ref 82–98)
MONOCYTES # BLD AUTO: 0.81 THOUSAND/ΜL (ref 0.17–1.22)
MONOCYTES NFR BLD AUTO: 9 % (ref 4–12)
NEUTROPHILS # BLD AUTO: 6.86 THOUSANDS/ΜL (ref 1.85–7.62)
NEUTS SEG NFR BLD AUTO: 72 % (ref 43–75)
NRBC BLD AUTO-RTO: 0 /100 WBCS
PLATELET # BLD AUTO: 213 THOUSANDS/UL (ref 149–390)
PMV BLD AUTO: 10.7 FL (ref 8.9–12.7)
POTASSIUM SERPL-SCNC: 4 MMOL/L (ref 3.5–5.3)
RBC # BLD AUTO: 3.97 MILLION/UL (ref 3.88–5.62)
SODIUM SERPL-SCNC: 137 MMOL/L (ref 135–147)
WBC # BLD AUTO: 9.37 THOUSAND/UL (ref 4.31–10.16)

## 2023-04-14 RX ORDER — HYDROMORPHONE HCL/PF 1 MG/ML
0.5 SYRINGE (ML) INJECTION ONCE
Status: COMPLETED | OUTPATIENT
Start: 2023-04-14 | End: 2023-04-14

## 2023-04-14 RX ADMIN — KETOROLAC TROMETHAMINE 15 MG: 30 INJECTION, SOLUTION INTRAMUSCULAR; INTRAVENOUS at 11:46

## 2023-04-14 RX ADMIN — HYDROMORPHONE HYDROCHLORIDE 0.5 MG: 1 INJECTION, SOLUTION INTRAMUSCULAR; INTRAVENOUS; SUBCUTANEOUS at 20:47

## 2023-04-14 RX ADMIN — IOTHALAMATE MEGLUMINE 325 ML: 172 INJECTION URETERAL at 13:10

## 2023-04-14 RX ADMIN — METRONIDAZOLE 500 MG: 500 INJECTION, SOLUTION INTRAVENOUS at 01:52

## 2023-04-14 RX ADMIN — CEFAZOLIN SODIUM 2000 MG: 2 SOLUTION INTRAVENOUS at 03:11

## 2023-04-14 RX ADMIN — CEFAZOLIN SODIUM 2000 MG: 2 SOLUTION INTRAVENOUS at 11:40

## 2023-04-14 RX ADMIN — HEPARIN SODIUM 5000 UNITS: 5000 INJECTION INTRAVENOUS; SUBCUTANEOUS at 04:03

## 2023-04-14 RX ADMIN — SODIUM CHLORIDE, SODIUM LACTATE, POTASSIUM CHLORIDE, AND CALCIUM CHLORIDE 125 ML/HR: .6; .31; .03; .02 INJECTION, SOLUTION INTRAVENOUS at 01:51

## 2023-04-14 RX ADMIN — HEPARIN SODIUM 5000 UNITS: 5000 INJECTION INTRAVENOUS; SUBCUTANEOUS at 22:05

## 2023-04-14 RX ADMIN — METRONIDAZOLE 500 MG: 500 INJECTION, SOLUTION INTRAVENOUS at 08:44

## 2023-04-14 RX ADMIN — CEFAZOLIN SODIUM 2000 MG: 2 SOLUTION INTRAVENOUS at 20:55

## 2023-04-14 RX ADMIN — METRONIDAZOLE 500 MG: 500 INJECTION, SOLUTION INTRAVENOUS at 17:02

## 2023-04-14 NOTE — PLAN OF CARE
Problem: Potential for Falls  Goal: Patient will remain free of falls  Description: INTERVENTIONS:  - Educate patient/family on patient safety including physical limitations  - Instruct patient to call for assistance with activity   - Consult OT/PT to assist with strengthening/mobility   - Keep Call bell within reach  - Keep bed low and locked with side rails adjusted as appropriate  - Keep care items and personal belongings within reach  - Initiate and maintain comfort rounds  - Make Fall Risk Sign visible to staff  - Offer Toileting every 2 Hours, in advance of need  - Initiate/Maintain alarm  - Obtain necessary fall risk management equipment:   - Apply yellow socks and bracelet for high fall risk patients  - Consider moving patient to room near nurses station  Outcome: Progressing     Problem: PAIN - ADULT  Goal: Verbalizes/displays adequate comfort level or baseline comfort level  Description: Interventions:  - Encourage patient to monitor pain and request assistance  - Assess pain using appropriate pain scale  - Administer analgesics based on type and severity of pain and evaluate response  - Implement non-pharmacological measures as appropriate and evaluate response  - Consider cultural and social influences on pain and pain management  - Notify physician/advanced practitioner if interventions unsuccessful or patient reports new pain  Outcome: Progressing     Problem: INFECTION - ADULT  Goal: Absence or prevention of progression during hospitalization  Description: INTERVENTIONS:  - Assess and monitor for signs and symptoms of infection  - Monitor lab/diagnostic results  - Monitor all insertion sites, i e  indwelling lines, tubes, and drains  - Monitor endotracheal if appropriate and nasal secretions for changes in amount and color  - [place] appropriate cooling/warming therapies per order  - Administer medications as ordered  - Instruct and encourage patient and family to use good hand hygiene technique  - Identify and instruct in appropriate isolation precautions for identified infection/condition  Outcome: Progressing     Problem: SKIN/TISSUE INTEGRITY - ADULT  Goal: Incision(s), wounds(s) or drain site(s) healing without S/S of infection  Description: INTERVENTIONS  - Assess and document dressing, incision, wound bed, drain sites and surrounding tissue  - Provide patient and family education  - Perform skin care/dressing changes every day  Outcome: Progressing

## 2023-04-14 NOTE — CASE MANAGEMENT
Case Management Assessment & Discharge Planning Note    Patient name Debra Creed  Location [facility] 2 Jacqueline Duffy* MRN [id number]  : 1965 Date 2023       Current Admission Date: 2023  Current Admission Diagnosis:Leukocytosis, Abdominal pain, Abdominal wall fluid collections   Patient Active Problem List    Diagnosis Date Noted   • Bladder perforation, intraoperative 2023   • Incisional hernia 2023   • Asthma    • Hepatitis C    • COPD (chronic obstructive pulmonary disease) ([facility] [address] )       LOS (days): 1  Geometric Mean LOS (GMLOS) (days): 3 40  Days to GMLOS:2 1     OBJECTIVE:  PATIENT READMITTED [address]  of Unplanned Readmission Score: 12 62         Current admission status: Inpatient       Preferred Pharmacy:   [address] [place] [address]  Phone: [phone] Fax: [address] [place] [address] [address] [address]  Phone: [phone] Fax: [phone]    Primary Care Provider: Chapito Motley MD    Primary Insurance: [address] [place]  Secondary Insurance:     ASSESSMENT:  [address] [place] [address], [address] [place] [address] Representative - Friend   Primary Phone: [phone] (Mobile)               Advance Directives  Does patient have a [address] [place] [address]?: No  Was patient offered paperwork?: Yes (declined)  Does patient currently have a Health Care decision maker?: Yes, please see Health Care Proxy section  Does patient have Advance Directives?: No  Was patient offered paperwork?: Yes (Declined)  Primary Contact: Damaris Dineroole- friend         Readmission Root Cause  30 Day Readmission: Yes  Who directed you to return to the hospital?: Self (was to be admitted day prior but left AMA from ER)  Did you understand whom to contact if you had questions or problems?: Yes  Did you get your prescriptions before you left the hospital?: No  Reason[de-identified] Preference for own pharmacy  Were you able to get your prescriptions filled when you left the hospital?: Yes  Did you take your medications as prescribed?: Yes  Were you able to get to your follow-up appointments?: Yes  During previous admission, was a post-acute recommendation made?: Yes  What post-acute resources were offered?: [facility]  Patient was readmitted due to: [facility] discharged from service on 4/7 due to drains/montejo being removed and incision healed over- had increased abd pain and admitted due to having abd wall abscess undergoing I&D with new CHITRA drain placed on IV Abx  Action Plan: Pt will again return home with [facility] () for wound care    Patient Information  Mental Status: Alert  During Assessment patient was accompanied by: Not accompanied during assessment  Assessment information provided by[de-identified] Patient  Primary Caregiver: Self  Support Systems: Karolyn Juárez 62 of Residence: [address] [facility] do you live in?: [address] [place] [address] entry access options   Select all that apply : Stairs  Number of steps to enter home : 4  Do the steps have railings?: Yes  Type of Current Residence: [address] home  Upon entering residence, is there a bedroom on the main floor (no further steps)?: No  A bedroom is located on the following floor levels of residence (select all that apply):: 2nd Floor  Upon entering residence, is there a bathroom on the main floor (no further steps)?: Yes (1/2 bath w/ full bath on 2nd floor)  Number of steps to 2nd floor from main floor: One Flight  In the last 12 months, was there a time when you were not able to pay the mortgage or rent on time?: Yes (There's a lot of us who live in the home so it makes it easier but being I haven't worked since September (since surgery) it's hard)  In the last 12 months, how many places have you lived?: 1  In the last 12 months, was there a time when you did not have a steady place to sleep or slept in "a shelter (including now)?: No  Homeless/housing insecurity resource given?: Refused (not needed at this time)  Living Arrangements: Lives w/ Friend (\"1/2 way house of sorts\")  Is patient a ?: No    Activities of Daily Living Prior to Admission  Completes ADLs independently?: Yes  Ambulates independently?: Yes  Does patient use assisted devices?: No  Does patient currently own DME?: No  Does patient have a history of Outpatient Therapy (PT/OT)?: No  Does the patient have a history of Short-Term Rehab?: No  Does patient have a history of HHC?: Yes  Does patient currently have [employer]?: No         Patient Information Continued  Income Source: Unemployed  Does patient have prescription coverage?: Yes  Within the past 12 months, you worried that your food would run out before you got the money to buy more : Never true  Within the past 12 months, the food you bought just didn't last and you didn't have money to get more : Never true  Food insecurity resource given?: N/A  Does patient have a history of substance abuse?: Yes  Historical substance use preference: Heroin (IV Use- 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- has had IP Drug Rehab prior to that time)  Is patient currently in treatment for substance abuse?: N/A - sober  Does patient have a history of Mental Health Diagnosis?: No         Means of Transportation  Means of Transport to Appts[de-identified] Friends  In the past 12 months, has lack of transportation kept you from medical appointments or from getting medications?: No  In the past 12 months, has lack of transportation kept you from meetings, work, or from getting things needed for daily living?: No  Was application for public transport provided?: N/A        DISCHARGE DETAILS:    Discharge planning discussed with[de-identified] pt  Freedom of Choice: Yes  Comments - Freedom of Choice: Pt agreeable to [facility] () for wound care stating he just finished up with [facility] and would like to have them again- referral sent with ability to accept pt- " information on AVS  CM contacted family/caregiver?: No- see comments (declined the need)  Were Treatment Team discharge recommendations reviewed with patient/caregiver?: Yes  Did patient/caregiver verbalize understanding of patient care needs?: Yes       Contacts  Patient Contacts:  Chacho Chopra  Relationship to Patient[de-identified] [address]          Is the patient interested in [facility] at discharge?: Yes  [address] requested[de-identified] [address] Name[de-identified] [facility] Provider[de-identified] PCP  Home Health Services Needed[de-identified] Wound/Ostomy Care, Post-Op Care and Assessment (abdominal wound dressing/drain care)  Homebound Criteria Met[de-identified] Requires the Assistance of Another Person for Safe Ambulation or to Leave the Home  Supporting Clincal Findings[de-identified] Limited Endurance    DME Referral Provided  Referral made for DME?: No    Other Referral/Resources/Interventions Provided:  Financial Resources Provided: Indigent Transportation (Lyft)    Would you like to participate in our [address] service program?  : No - Declined (Uses [facility] on [address])    Treatment Team Recommendation: Home with [address] [employer]  Dis[facility]ination Plan[facility]de-identified[facility]  Transport at Discharge : Free Local Transportation ([place] ride needed)

## 2023-04-14 NOTE — UTILIZATION REVIEW
Notification of Unplanned, Urgent, or   Emergency Inpatient Admission   [address]  Tax ID: [id number]  NPI: [id number]  Place of Service: [facility]  60[address]  Admission Level of Care: Inpatient  Place of Service Code: 24     Attending Physician Information  Attending Name and NPI#: Ana Paula Weber Md [[id number]]  Phone: [phone]     Admission Information  Inpatient Admission Date/Time: 4/13/23 10:58 AM  Discharge Date/Time: No discharge date for patient encounter  Admitting Diagnosis Code/Description:  Leukocytosis [D72 829]  Abdominal pain [R10 9]  Abdominal wall fluid collections [R18 8]     Utilization Review Contact  Ly Kerr Utilization   Phone: [phone]  Fax: [phone]  Email: [email or web address]  Contact for approvals/pending authorizations, clinical reviews, and discharge  Physician Advisory Services Contact  Medical Necessity Denial & Lakf-cc-Mqpe Discussion  Phone: [phone]  Fax: [phone]  Email: [email or web address]

## 2023-04-14 NOTE — UTILIZATION REVIEW
Initial Clinical Review    Admission: Date/Time/Statement:   Admission Orders (From admission, onward)     Ordered        04/13/23 1058  Inpatient Admission  Once                      Orders Placed This Encounter   Procedures   • Inpatient Admission     Standing Status:   Standing     Number of Occurrences:   1     Order Specific Question:   Level of Care     Answer:   Med Surg [16]     Order Specific Question:   Estimated length of stay     Answer:   More than 2 Midnights     Order Specific Question:   Certification     Answer:   I certify that inpatient services are medically necessary for this patient for a duration of greater than two midnights  See H&P and MD Progress Notes for additional information about the patient's course of treatment  ED Arrival Information     Expected   -    Arrival   4/13/2023 09:44    Acuity   Emergent            Means of arrival   Wheelchair    Escorted by   Self    Service   Surgery-General    Admission type   Emergency            Arrival complaint   Abdominal Pain           Chief Complaint   Patient presents with   • Abdominal Pain     Pt has hx of abscess/wound dehiscence from suprapubic removal and mesh repair of bladder  Was supposed to be admitted yesterday but left AMA  Pain increasingly worse so pt is back       Initial Presentation: 65 y o  male presents to the ED from home with c/o central abd pain post montejo removal after recent bladder perforation intraop  Found to have fluid collection in rectus muscle w/ poss hematoma/seroma/urinoma and cystitis  Was seen in ED 4/12 and offered admission for IV antibiotics, tx and left AMA  Today he returns with c/o worsening pain and erythematous area over the anterior wall of the abodomen with subjective fever  PMH: ventral hernia repair complicated by bladder perforation, montejo removed 7 days ago  Labs - leukocytosis, abnormal UA    Imaging - fluid collection rectus muscle, marked bladder wall thickening, newly enlarged pelvic lymph nodes  Treated with IV fluids, IV Dilaudid, IV antibiotics  On exam abd tenderness - suprapubic tenderness overlying an erythematous, fluctuance and indurated mass  No active drainage  No G/R  Admitted to INPATIENT status with extraperitoneal bladder injury c/f urinoma abd wall - montejo insertion, IR consult for drain w/ cultures, IV antibiotics, NPO, IV fluids, PRN analgesia, avoid opioids  4/13 IR Consult - Development of collection anterior abdomen near the bladder  Very inflamed skin, very tender patient will not allow us to touch it, No generalized abdominal tenderness  Will proceed with percutaneous drainage  Poss transverse of mesh         Preprocedure image - cellulitic appearing skin with associated central abscess  4/13 IR Procedure Note  Findings: placement of 10fr into lower abdominal fluid collection 75cc fluid  Appearance changed from cloudy urine appearance to karime purulunce  Purulent fluid  Unfortunately this appears to lie beneath the mesh  Drain is through the mesh         Date: 4/14   Day 2:   Pt is afebrile  VS stable  No leukocytosis this AM   Continues on IV fluids  Had 600 cc urine out and drain has 30 cc thin serous drainage  Can have regular diet  Continue IV antibiotics and Flagyl  Maintain montejo, local wound care, follow cultures, PRN analgesia, ambulate, consider urology consult  Drain culture: 2+ polys, no bacteria, remainder still pending        ED Triage Vitals [04/13/23 1001]   Temperature Pulse Respirations Blood Pressure SpO2   97 9 °F (36 6 °C) 89 22 114/73 99 %      Temp Source Heart Rate Source Patient Position - Orthostatic VS BP Location FiO2 (%)   Oral Monitor Lying Left arm --      Pain Score       10 - Worst Possible Pain          Wt Readings from Last 1 Encounters:   04/13/23 79 8 kg (175 lb 14 8 oz)     Additional Vital Signs:   04/14/23 07:31:48 98 9 °F (37 2 °C) 69 18 112/65 81 92 % -- -- None (Room air) Lying   04/13/23 20:43:47 99 3 °F (37 4 °C) 87 18 111/60 77 93 % -- -- -- --   04/13/23 16:46:03 98 5 °F (36 9 °C) 71 17 97/62 74 90 % -- -- -- --   04/13/23 1620 -- 74 11 Abnormal  -- -- 93 % -- -- -- --   04/13/23 1615 -- 75 11 Abnormal  94/62 -- 99 % -- -- -- --   04/13/23 1610 -- 85 14 99/66 -- 98 % -- -- -- --   04/13/23 1608 -- 72 14 106/72 -- 98 % 52 8 L/min Simple mask --   04/13/23 1605 -- 68 9 Abnormal  106/72 -- 96 % -- -- -- --   04/13/23 1603 -- 77 11 Abnormal  101/63 -- 92 % 32 3 L/min Nasal cannula --   04/13/23 1600 -- 70 11 Abnormal  102/73 -- 92 % 32 3 L/min Nasal cannula --   04/13/23 1558 -- 71 0 Abnormal  99/60 -- 94 % 32 3 L/min Nasal cannula --   04/13/23 1555 -- 68 12 97/63 -- 98 % -- -- -- --   04/13/23 1553 -- 69 14 114/64 -- 98 % 32 3 L/min Nasal cannula --   04/13/23 1550 -- 69 31 Abnormal  -- -- 97 % -- -- -- --   04/13/23 1500 -- 71 -- -- -- 92 % -- -- -- --   04/13/23 1400 -- 72 -- -- -- 91 % -- -- None (Room air) --   04/13/23 1300 -- 75 -- -- -- 94 % -- -- -- --   04/13/23 1220 -- 74 -- -- -- 90 % -- -- -- --   04/13/23 1218 -- 74 -- -- -- 90 % -- -- -- --   04/13/23 1213 -- 76 -- -- -- 89 % Abnormal  -- -- -- --   04/13/23 1208 -- 82 -- -- -- 89 % Abnormal  -- -- -- --   04/13/23 1203 -- 78 -- -- -- 90 % -- -- -- --   04/13/23 1158 -- 80 -- -- -- 91 % -- -- -- --   04/13/23 1106 -- 69 18 120/74 -- 98 % -- -- None (Room air) Lying     Pertinent Labs/Diagnostic Test Results:     4/12 ECG - Normal sinus rhythm    4/12 CTAP - Status post ventral hernia repair, with a water density fluid collection deep to the rectus muscle in the midline, possibly representing a liquefying hematoma/seroma versus a urinoma given the history of bladder perforation  No intraperitoneal extension    Marked bladder wall thickening in the dome of the bladder which may be due to intramural bladder wall hematoma  Few newly enlarged pelvic lymph nodes likely reactive          Results from last 7 days   Lab Units 04/14/23  1755 04/13/23  1033 04/12/23  1416   WBC Thousand/uL 9 37 13 43* 11 34*   HEMOGLOBIN g/dL 12 1 14 8 14 8   HEMATOCRIT % 35 5* 42 2 41 5   PLATELETS Thousands/uL 213 231 263   NEUTROS ABS Thousands/µL 6 86 10 86* 8 51*         Results from last 7 days   Lab Units 04/14/23  0434 04/13/23  1033 04/12/23  1416   SODIUM mmol/L 137 135 133*   POTASSIUM mmol/L 4 0 3 8 4 0   CHLORIDE mmol/L 106 101 100   CO2 mmol/L 26 27 24   ANION GAP mmol/L 5 7 9   BUN mg/dL 9 11 12   CREATININE mg/dL 0 64 0 72 0 79   EGFR ml/min/1 73sq m 109 104 100   CALCIUM mg/dL 8 0* 9 7 9 5   MAGNESIUM mg/dL  --  2 0  --      Results from last 7 days   Lab Units 04/13/23  1033 04/12/23  1416   AST U/L 12* 13   ALT U/L 9 10   ALK PHOS U/L 56 54   TOTAL PROTEIN g/dL 8 0 7 7   ALBUMIN g/dL 4 0 3 9   TOTAL BILIRUBIN mg/dL 0 91 0 72   BILIRUBIN DIRECT mg/dL 0 16  --          Results from last 7 days   Lab Units 04/14/23  0434 04/13/23  1033 04/12/23  1416   GLUCOSE RANDOM mg/dL 101 101 95     Results from last 7 days   Lab Units 04/13/23  1033 04/12/23  1416   PROTIME seconds 17 1* 16 6*   INR  1 40* 1 34*   PTT seconds 29 32         Results from last 7 days   Lab Units 04/12/23  1416   PROCALCITONIN ng/ml 0 05     Results from last 7 days   Lab Units 04/13/23  1033 04/12/23  1416   LACTIC ACID mmol/L 0 8 0 9     Results from last 7 days   Lab Units 04/13/23  1033   LIPASE u/L 21         Results from last 7 days   Lab Units 04/13/23  1044 04/12/23  1700   CLARITY UA  Clear Slightly Cloudy   COLOR UA  [place]   SPEC GRAV UA  1 020 1 015   PH UA  6 5 6 0   GLUCOSE UA mg/dl 100 (1/10%)* Negative   KETONES UA mg/dl Trace* Trace*   BLOOD UA  Trace-Intact* Trace*   PROTEIN UA mg/dl 30 (1+)* 100 (2+)*   NITRITE UA  Negative Positive*   BILIRUBIN UA  Small* Negative   UROBILINOGEN UA E U /dl >=8 0* 1 0   LEUKOCYTES UA  Trace* Negative   WBC UA /hpf 10-20* 10-20*   RBC UA /hpf 1-2* 0-1*   BACTERIA UA /hpf Occasional Occasional   EPITHELIAL CELLS WET PREP /hpf None Seen None Seen   MUCUS THREADS  Occasional*  --      Results from last 7 days   Lab Units 04/13/23  1556 04/13/23  1035 04/13/23  1033 04/12/23  1700 04/12/23  1417 04/12/23  1416   BLOOD CULTURE   --  Received in Microbiology Lab  Culture in Progress  Received in Microbiology Lab  Culture in Progress  --  No Growth at 24 hrs  No Growth at 24 hrs     GRAM STAIN RESULT  2+ Polys  No bacteria seen  --   --   --   --   --    URINE CULTURE   --   --   --  No Growth <1000 cfu/mL  --   --          ED Treatment:   Medication Administration from 04/13/2023 0944 to 04/13/2023 1251       Date/Time Order Dose Route Action     04/13/2023 1036 EDT sodium chloride 0 9 % bolus 1,000 mL 1,000 mL Intravenous New Bag     04/13/2023 1038 EDT ceFAZolin (ANCEF) IVPB (premix in dextrose) 2,000 mg 50 mL 2,000 mg Intravenous New Bag     04/13/2023 1104 EDT HYDROmorphone (DILAUDID) injection 0 5 mg 0 5 mg Intravenous Given     04/13/2023 1144 EDT lactated ringers infusion 125 mL/hr Intravenous New Bag        Past Medical History:   Diagnosis Date   • Asthma    • Bladder injury    • Bronchitis    • COPD (chronic obstructive pulmonary disease) ([facility] [address] )    • COVID 10/2021   • Hepatitis C    • Infectious viral hepatitis    • Liver disease    • Pneumonia    • Tinnitus      Admitting Diagnosis: Leukocytosis [D72 829]  Abdominal pain [R10 9]  Abdominal wall fluid collections [R18 8]  Age/Sex: 65 y o  male  Admission Orders:  Scheduled Medications:  cefazolin, 2,000 mg, Intravenous, Q8H  heparin (porcine), 5,000 Units, Subcutaneous, Q8H [address]  metroNIDAZOLE, 500 mg, Intravenous, Q8H  nicotine, 1 patch, Transdermal, Daily      Continuous IV Infusions:  lactated ringers, 125 mL/hr, Intravenous, Continuous      PRN Meds:  acetaminophen, 650 mg, Oral, Q6H PRN  ceFAZolin, , Intravenous, PRN - x 1 4/13  fentanyl citrate (PF), , Intravenous, PRN - x 4 4/13  ketorolac, 15 mg, Intravenous, Q6H PRN - x 1 4/13  midazolam, , , PRN - x 4 4/13    Urine culture  VS q 4 hr   IR Tube on R abd w/ standard flushing  Up as adalberto   INPATIENT CONSULT TO IR  IP CONSULT TO CASE MANAGEMENT  IP CONSULT TO UROLOGY    Network Utilization Review Department  ATTENTION: Please call with any questions or concerns to [phone] and carefully listen to the prompts so that you are directed to the right person  All voicemails are confidential   Sandra Metcalf all requests for admission clinical reviews, approved or denied determinations and any other requests to dedicated fax number below belonging to the campus where the patient is receiving treatment   List of dedicated fax numbers for the Facilities:  [address] DENIALS (Administrative/Medical Necessity) [phone]   [address] (Maternity/NICU/Pediatrics) [phone]   [address] Cara Bojorquez [phone]   [facility] [phone]   [address] [address] [address] [place] [address] Kaitlynn Davila [facility] 28 [phone]   1555 [facility] Ranjith Andrade [place] [address] 815 [facility] [phone]

## 2023-04-14 NOTE — PLAN OF CARE
Problem: Potential for Falls  Goal: Patient will remain free of falls  Description: INTERVENTIONS:  - Educate patient/family on patient safety including physical limitations  - Instruct patient to call for assistance with activity   - Consult OT/PT to assist with strengthening/mobility   - Keep Call bell within reach  - Keep bed low and locked with side rails adjusted as appropriate  - Keep care items and personal belongings within reach  - Initiate and maintain comfort rounds  - Make Fall Risk Sign visible to staff  - Offer Toileting every  Hours, in advance of need  - Initiate/Maintain bed alarm  - Obtain necessary fall risk management equipment:   - Apply yellow socks and bracelet for high fall risk patients  - Consider moving patient to room near nurses station  Outcome: Progressing     Problem: PAIN - ADULT  Goal: Verbalizes/displays adequate comfort level or baseline comfort level  Description: Interventions:  - Encourage patient to monitor pain and request assistance  - Assess pain using appropriate pain scale  - Administer analgesics based on type and severity of pain and evaluate response  - Implement non-pharmacological measures as appropriate and evaluate response  - Consider cultural and social influences on pain and pain management  - Notify physician/advanced practitioner if interventions unsuccessful or patient reports new pain  Outcome: Progressing     Problem: INFECTION - ADULT  Goal: Absence or prevention of progression during hospitalization  Description: INTERVENTIONS:  - Assess and monitor for signs and symptoms of infection  - Monitor lab/diagnostic results  - Monitor all insertion sites, i e  indwelling lines, tubes, and drains  - Monitor endotracheal if appropriate and nasal secretions for changes in amount and color  - [place] appropriate cooling/warming therapies per order  - Administer medications as ordered  - Instruct and encourage patient and family to use good hand hygiene technique  - Identify and instruct in appropriate isolation precautions for identified infection/condition  Outcome: Progressing     Problem: SKIN/TISSUE INTEGRITY - ADULT  Goal: Incision(s), wounds(s) or drain site(s) healing without S/S of infection  Description: INTERVENTIONS  - Assess and document dressing, incision, wound bed, drain sites and surrounding tissue  - Provide patient and family education  - Perform skin care/dressing changes every day  Outcome: Progressing

## 2023-04-14 NOTE — CONSULTS
UROLOGY CONSULTATION NOTE     Patient Identifiers: Rox Zavala (MRN [id number])  Service Requesting Consultation: Leo Johnson MD    Service Providing Consultation:  Urology, BESSY Cotto    Date of Service: 4/14/2023  Consults    Reason for Consultation: Fluid collection    ASSESSMENT:     65 y o  old male with  recent ventral hernia repair, complicated by inadvertent bladder injury, status post repair, negative cystogram and outpatient imaging 4/5, status post Saleem catheter removal, positive pelvic fluid collection abutting bladder status post IR drainage of purulent aspirate, and normal fluid creatinine  PLAN:   1  Continue medical optimization and empiric antibiosis  2  Maintain drain and Saleem for the time being  3  Feel reassured by normal fluid creatinine  4  However will repeat fluoroscopy cystogram given recent IV contrast   5  If negative can remove Saleem catheter prior to discharge  History of Present Illness:     Rox Zavlaa is a 65 y o  old with a history of ventral hernia status post robotic ventral hernia repair with incidental bladder perforation and repair 3/22/2022  Repair was performed by Dr Estevan Mondragon, general surgery  Patient was seen in follow-up with cystogram 4/5/2023 and catheter was removed  Patient states he was voiding volitionally without irritative or obstructive urinary symptoms  He presented to [facility] with chief complaint of lower abdominal pain CT of the abdomen and pelvis demonstrated extraperitoneal fluid collection concerning for urinoma and signs worrisome for bladder leak  Providers recommended admission at this time as well as Saleem catheter placement and IR consultation for drain insertion but patient left AMA  He returned a second time after experiencing fever  He is now status post IR insertion of percutaneous drain with approximately 75 cc purulent drainage    Fluid was sent for creatinine which was within normal limits  Patient has a Saleem which is draining clear yellow  Patient is afebrile, hemodynamically stable without evidence of leukocytosis or acute kidney injury  Thus far urine cultures negative for growth  Body fluid culture from aspirate positive for Staph aureus  CT of the abdomen and pelvis demonstrates well decompressed bilateral upper tracts without evidence of obstructing nephroureterolithiasis, perinephric stranding, perinephric bleeding, bladder hematoma or bladder calculi  Positive right external iliac lymph node  Reproductive organs were unremarkable  Marked bladder wall thickening in the superior aspect of the bladder which abutted an 11 x 3 x 4 0 8 x 7 6 cm fluid collection in the posterior aspect of the rectus muscle  Patient is seen in his room  He is comfortable denies complaints  Service was contacted to rule out bladder involvement      Past Medical, Past Surgical History:     Past Medical History:   Diagnosis Date   • Asthma    • Bladder injury    • Bronchitis    • COPD (chronic obstructive pulmonary disease) ([facility] [address] )    • COVID 10/2021   • Hepatitis C    • Infectious viral hepatitis    • Liver disease    • Pneumonia    • Tinnitus    :    Past Surgical History:   Procedure Laterality Date   • COLONOSCOPY W/ POLYPECTOMY  11/22/2022    6 MONTHS = MUST   • COLOSTOMY N/A 09/26/2022    Procedure: COLOSTOMY END;  Surgeon: Sd Bellamy MD;  [facility];  Service: General   • COLOSTOMY N/A 09/26/2022    Procedure: COLOSTOMY LOOP;  Surgeon: Sd Bellamy MD;  [facility];  Service: General   • FL CYSTOGRAM  4/5/2023   • INCISIONAL HERNIA REPAIR N/A 3/22/2023    Procedure: REPAIR COMPLEX INCISIONAL HERNIA  LAPAROSCOPIC WITH MESH, ROBOTIC ASSISTED, AND BLADDER [address];  Surgeon: Sd Bellamy MD;  [facility];  Service: General   • IR DRAINAGE TUBE PLACEMENT  4/13/2023   • KNEE ARTHROSCOPY Left    • LAPAROTOMY N/A 09/26/2022    Procedure: LAPAROTOMY EXPLORATORY, LYSIS OF ADHESIONS, LOW ANTERIOR RESECTION WITH PRIMARY ANASTOMOSIS;  Surgeon: Davidson Garay MD;  [facility];  Service: General   • MOUTH SURGERY     • NM CLOSURE ENTEROSTOMY LG/SMALL INTESTINE N/A 11/23/2022    Procedure: CLOSURE COLOSTOMY, LYSIS OF ADEHSIONS, RIGID SIGMOIDOSCOPY;  Surgeon: Davidson Garay MD;  [facility];  Service: General   • NM COLECTOMY PARTIAL W/ANASTOMOSIS N/A 09/26/2022    Procedure: RESECTION COLON SIGMOID;  Surgeon: Davidson Garay MD;  [facility];  Service: General   • NM [address] RGD DX W/WO COLLJ Paulina Gridere [facility] [address]/ [facility] N/A 09/26/2022    Procedure: SIGMOIDOSCOPY RIGID;  Surgeon: Davidson Garay MD;  [facility];  Service: General   :    Medications, Allergies:     Current Facility-Administered Medications   Medication Dose Route Frequency   • acetaminophen (TYLENOL) tablet 650 mg  650 mg Oral Q6H PRN   • ceFAZolin (ANCEF) injection   Intravenous PRN   • ceFAZolin (ANCEF) IVPB (premix in dextrose) 2,000 mg 50 mL  2,000 mg Intravenous Q8H   • fentanyl citrate (PF) 100 MCG/2ML   Intravenous PRN   • heparin (porcine) subcutaneous injection 5,000 Units  5,000 Units Subcutaneous Q8H [address]   • ketorolac (TORADOL) injection 15 mg  15 mg Intravenous Q6H PRN   • lactated ringers infusion  125 mL/hr Intravenous Continuous   • metroNIDAZOLE (FLAGYL) IVPB (premix) 500 mg 100 mL  500 mg Intravenous Q8H   • midazolam (VERSED) injection    PRN   • nicotine (NICODERM CQ) 14 mg/24hr TD 24 hr patch 1 patch  1 patch Transdermal Daily       Allergies:   Allergies   Allergen Reactions   • Bactrim [Sulfamethoxazole-Trimethoprim] Angioedema   • Ciprofloxacin Angioedema   • Dust Mite Extract Hives   • Sulfa Antibiotics Hives   :    Social and Family History:   Social History:   Social History     Tobacco Use   • Smoking status: Every Day     Packs/day: 0 25     Types: Cigarettes   • Smokeless tobacco: Never   • Tobacco comments:     Last smoked today 3 2 23 Vaping Use   • Vaping Use: Never used   Substance Use Topics   • Alcohol use: Not Currently   • Drug use: Not Currently     Types: Marijuana, Heroin, Methamphetamines, Fentanyl     Comment: nothing since 2020     Social History     Tobacco Use   Smoking Status Every Day   • Packs/day: 0 25   • Types: Cigarettes   Smokeless Tobacco Never   Tobacco Comments    Last smoked today 3 2 23       Family History:  Family History   Problem Relation Age of Onset   • Diabetes Father    • Heart disease Mother    :     Review of Systems:   Review of Systems - History obtained from chart review and the patient  General ROS: negative for - chills or fever  Respiratory ROS: no cough, shortness of breath, or wheezing  Cardiovascular ROS: no chest pain or dyspnea on exertion  Gastrointestinal ROS: positive for - abdominal pain, appetite loss and nausea/vomiting  Genito-Urinary ROS: no dysuria, trouble voiding, or hematuria  Neurological ROS: no TIA or stroke symptoms    All other systems queried were negative  Physical Exam:   General: Patient is pleasant and in NAD  Awake and alert  /65 (BP Location: Right arm)   Pulse 69   Temp 98 9 °F (37 2 °C) (Oral)   Resp 18   Wt 79 8 kg (175 lb 14 8 oz)   SpO2 92%   BMI 24 54 kg/m² Temp (24hrs), Av 9 °F (37 2 °C), Min:98 5 °F (36 9 °C), Max:99 3 °F (37 4 °C)  current; Temperature: 98 9 °F (37 2 °C)  I/O last 24 hours:   In: 0 [IV Piggyback:1050]  Out: 56 [Urine:600; Drains:30]    General appearance: alert and oriented, in no acute distress, appears stated age, cooperative and no distress  Head: Normocephalic, without obvious abnormality, atraumatic  Neck: no adenopathy, no carotid bruit, no JVD, supple, symmetrical, trachea midline and thyroid not enlarged, symmetric, no tenderness/mass/nodules  Lungs: diminished breath sounds  Heart: regular rate and rhythm, S1, S2 normal, no murmur, click, rub or gallop  Abdomen: abnormal findings:  mild tenderness in the lower abdomen  Extremities: extremities normal, warm and well-perfused; no cyanosis, clubbing, or edema  Pulses: 2+ and symmetric  Neurologic: Grossly normal  Saleem--clear myah  IR Drain--thick 30mL    Labs:     Lab Results   Component Value Date    HGB 12 1 04/14/2023    HCT 35 5 (L) 04/14/2023    WBC 9 37 04/14/2023     04/14/2023   ]    Lab Results   Component Value Date     08/04/2015    K 4 0 04/14/2023     04/14/2023    CO2 26 04/14/2023    BUN 9 04/14/2023    CREATININE 0 64 04/14/2023    CALCIUM 8 0 (L) 04/14/2023    GLUCOSE 160 (H) 08/04/2015   ]    Imaging:   I personally reviewed the images and report of the following studies, and reviewed them with the patient:    CT Abdomen: see below    IR drainage tube placement [[id number]] Collected: 04/14/23 0947   Order Status: Completed Updated: 04/14/23 1041   Narrative:     Abscess Drainage lower abdomen, midline, inferior to the umbilicus     History: Patient with recent ventral hernia repair with mesh   At the time of hernia repair there was a noted bladder injury which was repaired at that time   Since the surgery, the patient has developed a collection in the lower abdomen in the area of   hernia repair   Concerning for possible abscess versus urinoma  Sedation time: 30 minutes     Procedure: After explaining the risks and benefits of the procedure to the patient, informed consent was obtained  Ultrasound imaging was used to localize the collection in the lower abdomen   The patient was identified verbally and by wristband  Timeout was performed        The patient was prepped and draped in the usual sterile fashion   Using ultrasound guidance access was gained to the patient's lower abdominal collection collection using Milian needle  This procedure was performed in its entirety with the assistance and direct supervision of attending physician Dr Jair Jefferson       A specimen was aspirated, placed in a sterile container and sent to microbiology for culture and sensitivity as well as creatinine   An Amplatz wire was advanced into the cavity using ultrasound guidance   After tract dilation, an 10 [employer] all-purpose   catheter was advanced into the cavity        The catheter was secured in place and placed to bulb drainage        The patient tolerated the procedure well without apparent immediate complications  Findings: Placement of 10-Persian drain under ultrasound guidance into the lower abdominal abscess  El Brewer the skin from the right lower quadrant into the midline abscess   The abscess appears to be deep to the patient's implanted mesh   A tract   was established and drain is likely placed through the mesh  Specimens: Fluid collected from the abscess was sent for culture and creatinine   Fluid is grossly purulent approximately 170 mL drained, photographe saved in the brief operative note in the chart    Impression:     Impression:   Ultrasound guided drainage of midline lower abdominal abscess     The collection appears to be deep to the mesh and the drain is likely through the mesh  Plan: Tube to be flushed twice per day with 10 mL of saline   Monitor tube output   Please notify interventional radiology team if output decreases to less than 10 mL per day  Monisha Chavez will be scheduled for tube checks in the outpatient setting when   discharged from the hospital      Attending Physician: Dr Erickson Boone, direct supervision present and scrubbed for the whole case     Workstation performed: [id number]    CT abdomen pelvis with contrast [[id number]] Collected: 04/12/23 1536   Order Status: Completed Updated: 04/12/23 1615   Narrative:     CT ABDOMEN AND PELVIS WITH IV CONTRAST     INDICATION:   Abdominal pain, post-op   post op abdominal pain- ventral hernia repair/bladder perf, abdominal wall erythema, fever  COMPARISON:  Comparison is made to prior studies, most recent is dated February 25, 2023       TECHNIQUE:  CT examination of the abdomen and pelvis was performed  Multiplanar 2D reformatted images were created from the source data  Radiation dose length product (DLP) for this visit:  407 26 mGy-cm    This examination, like all CT scans performed in the [facility], was performed utilizing techniques to minimize radiation dose exposure, including the use of iterative    reconstruction and automated exposure control  IV Contrast:  100 mL of iohexol (OMNIPAQUE)   Enteric Contrast:  Enteric contrast was not administered  FINDINGS:     ABDOMEN     LOWER CHEST:  No clinically significant abnormality identified in the visualized lower chest      LIVER/BILIARY TREE:  Unremarkable  GALLBLADDER:  [place]     SPLEEN:  Unremarkable  PANCREAS:  Unremarkable  ADRENAL GLANDS:  Unremarkable  KIDNEYS/URETERS:  Unremarkable  No hydronephrosis  STOMACH AND BOWEL:  Colorectal anastomosis is unremarkable   No evidence of bowel obstruction or bowel wall thickening  APPENDIX:  No findings to suggest appendicitis  ABDOMINOPELVIC CAVITY:  2 7 x 1 2 cm right external iliac lymph node increased in size from the prior study, suspect reactive   Similar slight increase in size of an external iliac lymph node on the left  VESSELS:  Unremarkable for patient's age  PELVIS     REPRODUCTIVE ORGANS:  Unremarkable for patient's age  URINARY BLADDER:  Marked bladder wall thickening along the superior aspect of the bladder, which abuts the collection described below  ABDOMINAL WALL/INGUINAL REGIONS:  11 3 x 4 8 x 7 6 cm water density fluid collection posterior to the rectus muscle, with a small area of extension through the midline (series 2 image 113)   No intraperitoneal extension   This abuts the dome of the   bladder which is markedly thickened   Ill-defined increased density in the midline of the rectus muscle may represent intramuscular hematoma (series 2 images 1:15 through 129)  OSSEOUS STRUCTURES:  Possible focus of AVN in the right femoral head  Impression:       Status post ventral hernia repair, with a water density fluid collection deep to the rectus muscle in the midline, possibly representing a liquefying hematoma/seroma versus a urinoma given the history of bladder perforation   No intraperitoneal   extension  Marked bladder wall thickening in the dome of the bladder which may be due to intramural bladder wall hematoma  Few newly enlarged pelvic lymph nodes likely reactive  The study was marked in [facility] for immediate notification  Workstation performed: [id number]    [facility] cystogram [[id number]] Collected: 04/05/23 1113   Order Status: Completed Updated: 04/05/23 1118   Narrative:     CYSTOGRAM     INDICATION:   S37 20XA: Unspecified injury of bladder, initial encounter  COMPARISON:  2/25/2023     IMAGES:  24     FLUOROSCOPY TIME:   1 7 min     FINDINGS:     The urinary bladder was filled with contrast material in a retrograde fashion    225 cc of Cystografin was instilled into the bladder via gravity technique  The bladder distends normally  There is mild trabeculation of the bladder wall   There are no filling defects   No vesicoureteral reflux identified        After voiding, there is normal emptying of the urinary bladder  Impression:       No extraluminal contrast             Thank you for allowing me to participate in this patients’ care  Please do not hesitate to call with any additional questions    BESSY Mendez

## 2023-04-14 NOTE — PROGRESS NOTES
Progress Note - General Surgery   Harmony Jauregui 65 y o  male MRN: [id number]  Unit/Bed#: [facility] 2 -02 Encounter: [id number]    Assessment:  65 y o  M who is s/p ventral hernia repair with intra-operative bladder injury repair on 3/22, presents with evidence of an extraperitoneal bladder injury and signs concerning for a urinoma in the abdominal wall  4/13 S/p IR drainage of abdominal wall fluid collection   Drain creatinine 0 66 (serum 0 72)  Drain culture: 2+ polys, no bacteria, remainder still pending    Afebrile  VSS  On 2 L NC     cc  IR drain 30 cc thin serous drainage       Plan:  Regular diet as tolerated  Maintain IR drain, monitor output  Continue IV abx - ancef/flagyl  Maintain montejo catheter  Local wound care of midline wound   Follow up cultures   Prn analgesia  OO/Ambulate   DVT ppx   Consider urology consult       Subjective/Objective     Subjective: No acute events overnight  Pain is controlled on prn analgesia  Tolerating diet without nausea/vomiting  No fevers, chills  Objective:     Blood pressure 111/60, pulse 87, temperature 99 3 °F (37 4 °C), resp  rate 18, weight 79 8 kg (175 lb 14 8 oz), SpO2 93 %  ,Body mass index is 24 54 kg/m²  Intake/Output Summary (Last 24 hours) at 4/14/2023 3045  Last data filed at 4/14/2023 2492  Gross per 24 hour   Intake 1050 ml   Output 630 ml   Net 420 ml       Invasive Devices     Peripheral Intravenous Line  Duration           Peripheral IV 04/13/23 Left Antecubital <1 day          Drain  Duration           Abscess Drain Abdomen <1 day    Urethral Catheter Straight-tip 16 Fr  <1 day                Physical Exam:  NAD, alert and oriented x3  Normocephalic, atraumatic  Moist mucous membranes   Norm resp effort on room air   Regular rate  Abd soft, nondistended, IR drain in place with thin serous drainage and blood clot in bulb   Superficial skin dehiscence of midline wound with purulent drainage  Montejo in place with clear yellow urine No calf tenderness or peripheral edema  CN grossly intact   Skin is warm and dry      Lab, Imaging and other studies:  CBC:   Lab Results   Component Value Date    WBC 9 37 04/14/2023    HGB 12 1 04/14/2023    HCT 35 5 (L) 04/14/2023    MCV 89 04/14/2023     04/14/2023    MCH 30 5 04/14/2023    MCHC 34 1 04/14/2023    RDW 11 8 04/14/2023    MPV 10 7 04/14/2023    NRBC 0 04/14/2023   , CMP:   Lab Results   Component Value Date    SODIUM 135 04/13/2023    K 3 8 04/13/2023     04/13/2023    CO2 27 04/13/2023    BUN 11 04/13/2023    CREATININE 0 72 04/13/2023    CALCIUM 9 7 04/13/2023    AST 12 (L) 04/13/2023    ALT 9 04/13/2023    ALKPHOS 56 04/13/2023    EGFR 104 04/13/2023     VTE Pharmacologic Prophylaxis: Heparin  VTE Mechanical Prophylaxis: sequential compression device

## 2023-04-15 VITALS
OXYGEN SATURATION: 94 % | WEIGHT: 175.93 LBS | RESPIRATION RATE: 16 BRPM | BODY MASS INDEX: 24.54 KG/M2 | DIASTOLIC BLOOD PRESSURE: 61 MMHG | SYSTOLIC BLOOD PRESSURE: 100 MMHG | TEMPERATURE: 98.7 F | HEART RATE: 57 BPM

## 2023-04-15 PROBLEM — R10.9 ABDOMINAL PAIN: Status: ACTIVE | Noted: 2023-04-15

## 2023-04-15 LAB
ANION GAP SERPL CALCULATED.3IONS-SCNC: 3 MMOL/L (ref 4–13)
BACTERIA SPEC ANAEROBE CULT: NORMAL
BACTERIA SPEC BFLD CULT: ABNORMAL
BASOPHILS # BLD AUTO: 0.04 THOUSANDS/ΜL (ref 0–0.1)
BASOPHILS NFR BLD AUTO: 1 % (ref 0–1)
BUN SERPL-MCNC: 9 MG/DL (ref 5–25)
CALCIUM SERPL-MCNC: 8.4 MG/DL (ref 8.4–10.2)
CHLORIDE SERPL-SCNC: 108 MMOL/L (ref 96–108)
CO2 SERPL-SCNC: 27 MMOL/L (ref 21–32)
CREAT SERPL-MCNC: 0.61 MG/DL (ref 0.6–1.3)
EOSINOPHIL # BLD AUTO: 0.47 THOUSAND/ΜL (ref 0–0.61)
EOSINOPHIL NFR BLD AUTO: 8 % (ref 0–6)
ERYTHROCYTE [DISTWIDTH] IN BLOOD BY AUTOMATED COUNT: 11.8 % (ref 11.6–15.1)
GFR SERPL CREATININE-BSD FRML MDRD: 111 ML/MIN/1.73SQ M
GLUCOSE SERPL-MCNC: 100 MG/DL (ref 65–140)
GRAM STN SPEC: ABNORMAL
GRAM STN SPEC: ABNORMAL
HCT VFR BLD AUTO: 36.8 % (ref 36.5–49.3)
HGB BLD-MCNC: 12.7 G/DL (ref 12–17)
IMM GRANULOCYTES # BLD AUTO: 0.04 THOUSAND/UL (ref 0–0.2)
IMM GRANULOCYTES NFR BLD AUTO: 1 % (ref 0–2)
LYMPHOCYTES # BLD AUTO: 1.3 THOUSANDS/ΜL (ref 0.6–4.47)
LYMPHOCYTES NFR BLD AUTO: 23 % (ref 14–44)
MCH RBC QN AUTO: 30.8 PG (ref 26.8–34.3)
MCHC RBC AUTO-ENTMCNC: 34.5 G/DL (ref 31.4–37.4)
MCV RBC AUTO: 89 FL (ref 82–98)
MONOCYTES # BLD AUTO: 0.52 THOUSAND/ΜL (ref 0.17–1.22)
MONOCYTES NFR BLD AUTO: 9 % (ref 4–12)
NEUTROPHILS # BLD AUTO: 3.24 THOUSANDS/ΜL (ref 1.85–7.62)
NEUTS SEG NFR BLD AUTO: 58 % (ref 43–75)
NRBC BLD AUTO-RTO: 0 /100 WBCS
PLATELET # BLD AUTO: 211 THOUSANDS/UL (ref 149–390)
PMV BLD AUTO: 10.1 FL (ref 8.9–12.7)
POTASSIUM SERPL-SCNC: 4 MMOL/L (ref 3.5–5.3)
RBC # BLD AUTO: 4.12 MILLION/UL (ref 3.88–5.62)
SODIUM SERPL-SCNC: 138 MMOL/L (ref 135–147)
WBC # BLD AUTO: 5.61 THOUSAND/UL (ref 4.31–10.16)

## 2023-04-15 RX ORDER — CEPHALEXIN 500 MG/1
500 CAPSULE ORAL EVERY 6 HOURS SCHEDULED
Qty: 20 CAPSULE | Refills: 0 | Status: SHIPPED | OUTPATIENT
Start: 2023-04-15 | End: 2023-04-20

## 2023-04-15 RX ADMIN — HEPARIN SODIUM 5000 UNITS: 5000 INJECTION INTRAVENOUS; SUBCUTANEOUS at 15:00

## 2023-04-15 RX ADMIN — CEFAZOLIN SODIUM 2000 MG: 2 SOLUTION INTRAVENOUS at 04:57

## 2023-04-15 RX ADMIN — METRONIDAZOLE 500 MG: 500 INJECTION, SOLUTION INTRAVENOUS at 01:50

## 2023-04-15 RX ADMIN — CEFAZOLIN SODIUM 2000 MG: 2 SOLUTION INTRAVENOUS at 12:00

## 2023-04-15 RX ADMIN — KETOROLAC TROMETHAMINE 15 MG: 30 INJECTION, SOLUTION INTRAMUSCULAR; INTRAVENOUS at 05:03

## 2023-04-15 RX ADMIN — METRONIDAZOLE 500 MG: 500 INJECTION, SOLUTION INTRAVENOUS at 09:36

## 2023-04-15 RX ADMIN — HEPARIN SODIUM 5000 UNITS: 5000 INJECTION INTRAVENOUS; SUBCUTANEOUS at 05:03

## 2023-04-15 NOTE — DISCHARGE INSTR - OTHER ORDERS
Cleanse the wound with 10 cc normal saline  Place nonadhesive dressing over midline wound  Place a 4x4 gauze over this  Tape this in place  Change daily or more frequently if dressing becomes saturated

## 2023-04-15 NOTE — DISCHARGE INSTR - AVS FIRST PAGE
Wound care instructions for midline abdominal wound: Cleanse the wound with 10 cc normal saline  Place nonadhesive dressing over midline wound  Place a 4x4 gauze over this  Tape this in place  Change daily or more frequently if dressing becomes saturated  Darya Scales Instructions  Dr Devi Green MD, FACS     1  General: You will feel pulling sensations around the wound or funny aches and pains around the incisions  This is normal  Even minor surgery is a change in your body and this is your body’s way of reaction to it  If you have had abdominal surgery, it may help to support the incision with a small pillow or blanket for comfort when moving or coughing  2  Wound care: Make sure to remove the bandage in about 24 hours, unless instructed otherwise  You usually don't have to redress the wound after 24-48 hours, unless for comfort  Keep the incision clean and dry  Let air get to it  If this Steri-Strips fall off, just keep the wound clean  3  Water: You may shower over the wound, unless there are drain tubes left in place  Do not bathe or use a pool or hot tub until cleared by the physician  You may shower right over the staples or Steri-Strips and packing dry when you are done  4  Activity: You may go up and down stairs, walk as much as you are comfortable, but walk at least 3 times each day  If you have had abdominal surgery, do not lift anything heavier than 15 pounds for at least 2-4 weeks, unless cleared by the doctor  5  Diet: You may resume a regular diet  If you had a same-day surgery or overnight stay surgery, you may wish to eat lightly for a few days: soups, crackers, and sandwiches  You may resume a regular diet when ready  6  Medications: Resume all of your previous medications, unless told otherwise by the doctor  Avoid aspirin or ibuprofen (Advil, Motrin, etc ) products for 2-3 days after the date of surgery   You may, at that time, began to take them again  Tylenol is always fine, unless you are taking any narcotic pain medication containing Tylenol (such as Percocet, Darvocet, Vicodin, or anything containing acetaminophen)  Do not take Tylenol if you're taking these medications  You do not need to take the narcotic pain medications unless you are having significant pain and discomfort  7  Driving: You will need someone to drive you home on the day of surgery  Do not drive or make any important decisions while on narcotic pain medication or 24 hours and after anesthesia or sedation for surgery  Generally, you may drive when your off all narcotic pain medications  8  Upset Stomach: You may take Maalox, Tums, or similar items for an upset stomach  If your narcotic pain medication causes an upset stomach, do not take it on an empty stomach  Try taking it with at least some crackers or toast       9  Constipation: Patients often experienced constipation after surgery  You may take over-the-counter medication for this, such as Metamucil, Senokot, Dulcolax, milk of magnesia, etc  You may take a suppository unless you have had anorectal surgery such as a procedure on your hemorrhoids  If you experience significant nausea or vomiting after abdominal surgery, call the office before trying any of these medications  10  Call the office: If you are experiencing any of the following, fevers above 101 5°, significant nausea or vomiting, if the wound develops drainage and/or is excessive redness around the wound, or if you have significant diarrhea or other worsening symptoms  11  Pain: You may be given a prescription for pain  This will be given to the hospital, the day of surgery  12  Sexual Activity: You may resume sexual activity when you feel ready and comfortable and your incision is sealed and healed without apparent infection risk       13  Urination: If you haven't urinated in 6 hours, go directly to the ER for evaluation for urinary retention        [address]  [place], [address]   Phone: [phone]

## 2023-04-15 NOTE — NURSING NOTE
Reviewed discharge instructions and upcoming medications  Discussed the importance of follow up care  Reviewed drain care, provided dressings and some flushes until visiting nurse's visit

## 2023-04-15 NOTE — PROGRESS NOTES
Progress Note - General Surgery   [facility] Resident on Surgery Service   Petrona Huang 65 y o  male MRN: [id number]  Unit/Bed#: [facility] 68 2 -02 Encounter: [id number]    Assessment:  65 y o  M who is s/p ventral hernia repair with intra-operative bladder injury repair on 3/22, presents with evidence of an extraperitoneal bladder injury and signs concerning for a urinoma in the abdominal wall  4/14 IR drain of abdominal wall fluid collection    Afebrile  VSS     IR drain 30 cc ss output  WBC 5 6 from 9 3  Hgb 12 7 from 12 1  Cr 0 61    Plan:  -regular diet  -maintain IR drain  -continue ABX  -dc montejo cath on discharge, appreciate urology recs  -daily packing change of midline wound  -prn pain meds  -prn anti-nausea meds  -dvt prophylaxis  -CM for VNA      Subjective/Objective     Subjective: No acute events overnight  Patient denies having nausea, vomiting, fevers, chills, chest pain, shortness of breath  VERY tender midline wound  Has not had bowel movement  Not passed flatus  Objective:     Blood pressure 101/64, pulse 64, temperature 99 2 °F (37 3 °C), resp  rate 22, weight 79 8 kg (175 lb 14 8 oz), SpO2 92 %  ,Body mass index is 24 54 kg/m²  Intake/Output Summary (Last 24 hours) at 4/15/2023 0734  Last data filed at 4/15/2023 0204  Gross per 24 hour   Intake --   Output 980 ml   Net -980 ml       Invasive Devices     Peripheral Intravenous Line  Duration           Peripheral IV 04/13/23 Left Antecubital 1 day          Drain  Duration           Abscess Drain Abdomen 1 day    Urethral Catheter Straight-tip 16 Fr  1 day                General: NAD  HENT: NCAT MMM  Neck: supple, no JVD  CV: nl rate  Lungs: nl wob  No resp distress  ABD: Soft, midline wound EXTREMELY tender to palpation, nondistended   See above for drain output and drain character  Extrem: No CCE  Neuro: AAOx3       Scheduled Meds:  Current Facility-Administered Medications   Medication Dose Route Frequency Provider Last Rate   • acetaminophen  650 mg Oral Q6H PRN Gabe Gall, DO     • ceFAZolin   Intravenous PRN Pretty De Leon MD     • cefazolin  2,000 mg Intravenous Q8H Brittani Theodore DO 2,000 mg (04/15/23 0457)   • fentanyl citrate (PF)   Intravenous PRN Pretty De Leon MD     • heparin (porcine)  5,000 Units Subcutaneous [facility] Tracey Youngblood DO     • ketorolac  15 mg Intravenous Q6H PRN Reeder Gall, DO     • metroNIDAZOLE  500 mg Intravenous Q8H Brittani Theodore  mg (04/15/23 0150)   • midazolam    PRN Pretty De Leon MD     • nicotine  1 patch Transdermal Daily Gabe Gall, DO       Continuous Infusions:   PRN Meds: •  acetaminophen  •  ceFAZolin  •  fentanyl citrate (PF)  •  ketorolac  •  midazolam      Lab, Imaging and other studies:I have personally reviewed pertinent lab results      VTE Pharmacologic Prophylaxis: Sequential compression device (Venodyne)  and Heparin  VTE Mechanical Prophylaxis: sequential compression device      Alberto Hancock MD  4/15/2023 7:34 AM

## 2023-04-15 NOTE — PLAN OF CARE
Problem: Potential for Falls  Goal: Patient will remain free of falls  Description: INTERVENTIONS:  - Educate patient/family on patient safety including physical limitations  - Instruct patient to call for assistance with activity   - Consult OT/PT to assist with strengthening/mobility   - Keep Call bell within reach  - Keep bed low and locked with side rails adjusted as appropriate  - Keep care items and personal belongings within reach  - Initiate and maintain comfort rounds  - Make Fall Risk Sign visible to staff  - Offer Toileting every  Hours, in advance of need  - Initiate/Maintain bed alarm  - Obtain necessary fall risk management equipment:   - Apply yellow socks and bracelet for high fall risk patients  - Consider moving patient to room near nurses station  4/15/2023 0220 by Erika Coelho RN  Outcome: Progressing  4/15/2023 0216 by Erika Coelho RN  Outcome: Progressing     Problem: PAIN - ADULT  Goal: Verbalizes/displays adequate comfort level or baseline comfort level  Description: Interventions:  - Encourage patient to monitor pain and request assistance  - Assess pain using appropriate pain scale  - Administer analgesics based on type and severity of pain and evaluate response  - Implement non-pharmacological measures as appropriate and evaluate response  - Consider cultural and social influences on pain and pain management  - Notify physician/advanced practitioner if interventions unsuccessful or patient reports new pain  4/15/2023 0220 by Erika Coelho RN  Outcome: Progressing  4/15/2023 0216 by Erika Coelho RN  Outcome: Progressing     Problem: INFECTION - ADULT  Goal: Absence or prevention of progression during hospitalization  Description: INTERVENTIONS:  - Assess and monitor for signs and symptoms of infection  - Monitor lab/diagnostic results  - Monitor all insertion sites, i e  indwelling lines, tubes, and drains  - Monitor endotracheal if appropriate and nasal secretions for changes in amount and color  - [place] appropriate cooling/warming therapies per order  - Administer medications as ordered  - Instruct and encourage patient and family to use good hand hygiene technique  - Identify and instruct in appropriate isolation precautions for identified infection/condition  4/15/2023 0220 by Romana Snow, RN  Outcome: Progressing  4/15/2023 0216 by Romana Snow, RN  Outcome: Progressing     Problem: SKIN/TISSUE INTEGRITY - ADULT  Goal: Incision(s), wounds(s) or drain site(s) healing without S/S of infection  Description: INTERVENTIONS  - Assess and document dressing, incision, wound bed, drain sites and surrounding tissue  - Provide patient and family education  - Perform skin care/dressing changes every day  4/15/2023 0220 by Romana Snow, RN  Outcome: Progressing  4/15/2023 0216 by Romana Snow, RN  Outcome: Progressing     Problem: GENITOURINARY - ADULT  Goal: Maintains or returns to baseline urinary function  Description: INTERVENTIONS:  - Assess urinary function  - Encourage oral fluids to ensure adequate hydration if ordered  - Administer IV fluids as ordered to ensure adequate hydration  - Administer ordered medications as needed  - Offer frequent toileting  - Follow urinary retention protocol if ordered  Outcome: Progressing  Goal: Urinary catheter remains patent  Description: INTERVENTIONS:  - Assess patency of urinary catheter  - If patient has a chronic montejo, consider changing catheter if non-functioning  - Follow guidelines for intermittent irrigation of non-functioning urinary catheter  Outcome: Progressing     Problem: DISCHARGE PLANNING  Goal: Discharge to home or other facility with appropriate resources  Description: INTERVENTIONS:  - Identify barriers to discharge w/patient and caregiver  - Arrange for needed discharge resources and transportation as appropriate  - Identify discharge learning needs (meds, wound care, etc )  - Arrange for interpretive services to assist at discharge as needed  - Refer to Case Management Department for coordinating discharge planning if the patient needs post-hospital services based on physician/advanced practitioner order or complex needs related to functional status, cognitive ability, or social support system  Outcome: Progressing     Problem: Knowledge Deficit  Goal: Patient/family/caregiver demonstrates understanding of disease process, treatment plan, medications, and discharge instructions  Description: Complete learning assessment and assess knowledge base    Interventions:  - Provide teaching at level of understanding  - Provide teaching via preferred learning methods  Outcome: Progressing

## 2023-04-15 NOTE — PLAN OF CARE
Problem: Potential for Falls  Goal: Patient will remain free of falls  Description: INTERVENTIONS:  - Educate patient/family on patient safety including physical limitations  - Instruct patient to call for assistance with activity   - Consult OT/PT to assist with strengthening/mobility   - Keep Call bell within reach  - Keep bed low and locked with side rails adjusted as appropriate  - Keep care items and personal belongings within reach  - Initiate and maintain comfort rounds  - Make Fall Risk Sign visible to staff  - Offer Toileting every  Hours, in advance of need  - Initiate/Maintain bed alarm  - Obtain necessary fall risk management equipment:   - Apply yellow socks and bracelet for high fall risk patients  - Consider moving patient to room near nurses station  Outcome: Progressing     Problem: PAIN - ADULT  Goal: Verbalizes/displays adequate comfort level or baseline comfort level  Description: Interventions:  - Encourage patient to monitor pain and request assistance  - Assess pain using appropriate pain scale  - Administer analgesics based on type and severity of pain and evaluate response  - Implement non-pharmacological measures as appropriate and evaluate response  - Consider cultural and social influences on pain and pain management  - Notify physician/advanced practitioner if interventions unsuccessful or patient reports new pain  Outcome: Progressing     Problem: INFECTION - ADULT  Goal: Absence or prevention of progression during hospitalization  Description: INTERVENTIONS:  - Assess and monitor for signs and symptoms of infection  - Monitor lab/diagnostic results  - Monitor all insertion sites, i e  indwelling lines, tubes, and drains  - Monitor endotracheal if appropriate and nasal secretions for changes in amount and color  - [place] appropriate cooling/warming therapies per order  - Administer medications as ordered  - Instruct and encourage patient and family to use good hand hygiene technique  - Identify and instruct in appropriate isolation precautions for identified infection/condition  Outcome: Progressing     Problem: Knowledge Deficit  Goal: Patient/family/caregiver demonstrates understanding of disease process, treatment plan, medications, and discharge instructions  Description: Complete learning assessment and assess knowledge base    Interventions:  - Provide teaching at level of understanding  - Provide teaching via preferred learning methods  Outcome: Progressing

## 2023-04-15 NOTE — QUICK NOTE
Lower midline wound check performed  The wound was copiously irrigated with normal saline and packed with 2' stretch bandage wrap  The patient was given 0 5 of IV Dilaudid  The patient tolerated the packing change, albeit, not that well  He is very sensitive around the midline wound  Will continue to perform daily packing changes  Will need to take updated picture of the wound and place the picture in the patient's chart

## 2023-04-17 NOTE — UTILIZATION REVIEW
NOTIFICATION OF ADMISSION DISCHARGE   This is a Notification of Discharge from [facility]  Please be advised that this patient has been discharge from our facility  Below you will find the admission and discharge date and time including the patient’s disposition  UTILIZATION REVIEW CONTACT:  Sandra Barakat  Utilization   Network Utilization Review Department  Phone: [phone] x carefully listen to the prompts  All voicemails are confidential   Email: [email or web address]     ADMISSION INFORMATION  PRESENTATION DATE: 4/13/2023  9:52 AM  OBERVATION ADMISSION DATE:   INPATIENT ADMISSION DATE: 4/13/23 10:58 AM   DISCHARGE DATE: 4/15/2023  6:02 PM   DISPOSITION:Home with Home Health Care    IMPORTANT INFORMATION:  Send all requests for admission clinical reviews, approved or denied determinations and any other requests to dedicated fax number below belonging to the campus where the patient is receiving treatment   List of dedicated fax numbers:  [address] DENIALS (Administrative/Medical Necessity) [phone]   [address] (Maternity/NICU/Pediatrics) [phone]   [facility] [phone]   [facility] [phone]   [address] [phone]   [address] [facility] [phone] 90 [facility] [phone]   [address] [place] [address] 119 [phone]   [facility]  [phone]   [address] [facility] [phone]   [facility] [address] [facility] [phone]

## 2023-04-18 LAB
BACTERIA BLD CULT: NORMAL
BACTERIA BLD CULT: NORMAL

## 2023-04-21 ENCOUNTER — HOME CARE VISIT (OUTPATIENT)
Dept: HOME HEALTH SERVICES | Facility: HOME HEALTHCARE | Age: 58
End: 2023-04-21

## 2023-04-23 VITALS
SYSTOLIC BLOOD PRESSURE: 108 MMHG | RESPIRATION RATE: 18 BRPM | HEART RATE: 80 BPM | OXYGEN SATURATION: 86 % | DIASTOLIC BLOOD PRESSURE: 70 MMHG | TEMPERATURE: 98.8 F

## 2023-04-24 ENCOUNTER — HOME CARE VISIT (OUTPATIENT)
Dept: HOME HEALTH SERVICES | Facility: HOME HEALTHCARE | Age: 58
End: 2023-04-24

## 2023-04-24 VITALS
OXYGEN SATURATION: 95 % | SYSTOLIC BLOOD PRESSURE: 128 MMHG | TEMPERATURE: 97.9 F | RESPIRATION RATE: 18 BRPM | HEART RATE: 62 BPM | DIASTOLIC BLOOD PRESSURE: 70 MMHG

## 2023-04-24 DIAGNOSIS — R18.8 ABDOMINAL WALL FLUID COLLECTIONS: ICD-10-CM

## 2023-04-24 RX ORDER — MAG HYDROX/ALUMINUM HYD/SIMETH 400-400-40
SUSPENSION, ORAL (FINAL DOSE FORM) ORAL
Qty: 10 ML | Refills: 20 | Status: SHIPPED | OUTPATIENT
Start: 2023-04-24

## 2023-04-25 ENCOUNTER — HOME CARE VISIT (OUTPATIENT)
Dept: HOME HEALTH SERVICES | Facility: HOME HEALTHCARE | Age: 58
End: 2023-04-25

## 2023-04-26 NOTE — DISCHARGE SUMMARY
Discharge Summary - Farideh Sender 65 y o  male MRN: [id number]    Unit/Bed#: [facility] 68 2 -02 Encounter: [id number]    Admission Date:   Admission Orders (From admission, onward)     Ordered        04/13/23 1058  Inpatient Admission  Once                        Admitting Diagnosis: Leukocytosis [D72 829]  Abdominal pain [R10 9]  Abdominal wall fluid collections [R18 8]    HPI: 65 y o  male with PMHx of polysubstance abuse, hepatitis C, COPD, who is s/p ventral hernia repair with intra-operative bladder repair on 3/22, who initially presented yesterday complaining of worsening lower abdominal pain since his montejo catheter was removed on 4/6/23  ED workup revealed an extraperitoneal fluid collection concerning for a urinoma and signs concerning for a bladder leak  He was recommended admission for IV abx, montejo placement, and IR consult for drain placement, however patient left AMA  He returns to the ER today complaining of worsening pain and redness overlying this extraperitoneal fluid collection  He has had persistent fevers are home for the last week, T max 102  No nausea or vomiting  Denies any other complaints  He has been voiding spontaneously without issue  The character of his urine has been normal      Procedures Performed: 4/13 IR drain placement     Summary of Hospital Course: Patient was admitted to the general surgery service and started on IV antibiotics  Interventional radiology was consulted for percutaneous drain placement  Montejo catheter was placed  Patient underwent placement of 10 [place] drain into a lower abdominal fluid collection on 4/13/2023  This revealed purulent slightly blood-tinged fluid  Patient was maintained on IV antibiotics and narrowed as cultures resulted  Cystogram was performed to evaluate for bladder injury, which did not reveal a bladder leak  His diet was advanced which he tolerated  Case management was engaged to aid in VNA for drain care    A portion of his lower midline wound superficially dehisced and this was cleansed with normal saline flushes and packed  On hospital day 2, 4/15/2023, patient was deemed stable for discharge home  He was tolerating a diet, afebrile, no leukocytosis, and voiding spontaneously after Saleem catheter removal     Significant Findings, Care, Treatment and Services Provided: As stated above     Complications: None     Discharge Diagnosis: Leukocytosis [D72 829]  Abdominal pain [R10 9]  Abdominal wall fluid collections [R18 8]    Medical Problems     Resolved Problems  Date Reviewed: 4/12/2023   None         Condition at Discharge: good         Discharge instructions/Information to patient and family:   See after visit summary for information provided to patient and family  Provisions for Follow-Up Care:  See after visit summary for information related to follow-up care and any pertinent home health orders  PCP: Sadie Hancock MD    Disposition: Home    Planned Readmission: No      Discharge Statement   I spent 20 minutes discharging the patient  This time was spent on the day of discharge  I had direct contact with the patient on the day of discharge  Additional documentation is required if more than 30 minutes were spent on discharge  Discharge Medications:  See after visit summary for reconciled discharge medications provided to patient and family

## 2023-04-27 ENCOUNTER — HOME CARE VISIT (OUTPATIENT)
Dept: HOME HEALTH SERVICES | Facility: HOME HEALTHCARE | Age: 58
End: 2023-04-27

## 2023-04-28 ENCOUNTER — HOSPITAL ENCOUNTER (OUTPATIENT)
Dept: RADIOLOGY | Facility: HOSPITAL | Age: 58
Discharge: HOME | End: 2023-04-28
Admitting: RADIOLOGY

## 2023-04-28 DIAGNOSIS — R18.8 ABDOMINAL WALL FLUID COLLECTIONS: Primary | ICD-10-CM

## 2023-04-28 RX ADMIN — IOHEXOL 5 ML: 350 INJECTION, SOLUTION INTRAVENOUS at 11:11

## 2023-04-28 NOTE — SEDATION DOCUMENTATION
The tube check was performed  Dr Archana Quintana expressed the importance of flushing the tube  The patient reported the difficulty getting the flushes  Dr Archana Quintana will place an order for the flushes to the [facility] pharmacy  Patient will  on his way out

## 2023-05-01 ENCOUNTER — HOME CARE VISIT (OUTPATIENT)
Dept: HOME HEALTH SERVICES | Facility: HOME HEALTHCARE | Age: 58
End: 2023-05-01

## 2023-05-01 VITALS
OXYGEN SATURATION: 95 % | DIASTOLIC BLOOD PRESSURE: 68 MMHG | HEART RATE: 80 BPM | SYSTOLIC BLOOD PRESSURE: 128 MMHG | TEMPERATURE: 98 F | RESPIRATION RATE: 18 BRPM

## 2023-05-02 VITALS
OXYGEN SATURATION: 96 % | TEMPERATURE: 98.1 F | DIASTOLIC BLOOD PRESSURE: 60 MMHG | RESPIRATION RATE: 18 BRPM | SYSTOLIC BLOOD PRESSURE: 122 MMHG | HEART RATE: 70 BPM

## 2023-05-09 NOTE — PROGRESS NOTES
"Assessment/Plan:   Sowmya De Luna is a 65 y o male who comes in today for postoperative check after [address], ROBOTIC ASSISTED, AND BLADDER [address]  with Dr Juan Manuel Briceno on 3/22/23 with Dr Juan Manuel Briceno  CHITRA drain in place and montejo catheter in place  Patient went to ER for abdominal pain and abscess and needed another CHITRA drain placed  Mesh used: 10 x 15 cm coated ST dual sided mesh       Plan:  1  Patient had RLQ CHITRA drain removed today with no issues  Patient will follow up PRN  Postoperative restrictions reviewed  All questions answered  ______________________________________________________  HPI:  Sowmya De Luna is a 65 y o male who comes in today for postoperative check after recent [address], ROBOTIC ASSISTED, AND BLADDER [address]  with Dr Juan Manuel Briceno on 3/22/23 with Dr Juan Manuel Briceno  Currently doing well with some problems : pain in RLQ where drain site is worsening this morning  Was feeling good after the ER visit with moving around  Now hurts to walk and sit in RLQ since this morning  Normal output in montejo cathetor  No erythema or infection, no fever or chills, no nausea and no vomiting  ER note 3/24/23 POD2:  \"57y M POD 2 Robotic assisted ventral hernia repair complicated by bladder perforation s/p intra-op closure  discharged yesterday w/ montejo/leg bag and CHITRA drain  woke overnight w/ abd distension, cold sweats - noted leg bag was empty  got up to try and have bm and had some urine outpt into the bag  over the next few hours, multiple episodes of chills/cold sweats, nausea and no drainage from montejo when laying flat  reports abd was distended, abd binder and dressing surrounding CHITRA drain \"soaked\"  this am, tried sleeping sitting up which allowed for urine drainage   since overnight/this am believes he's emptied his CHITRA at least 4-5 times and believes the volume was 400-500ml - " "initially karime blood and now serosanguinous  having dark urine drainage in montejo bag  no longer w/ chills  \"    5/15/23:    Patient doing well today with no issues, no nausea, vomiting, diarrhea, eating well  Getting 10-15 cc of fluid out  HPI: 65 y  o  male with PMHx of polysubstance abuse, hepatitis C, COPD, who is s/p ventral hernia repair with intra-operative bladder repair on 3/22, who initially presented yesterday complaining of worsening lower abdominal pain since his montejo catheter was removed on 4/6/23  ED workup revealed an extraperitoneal fluid collection concerning for a urinoma and signs concerning for a bladder leak  He was recommended admission for IV abx, montejo placement, and IR consult for drain placement, however patient left AMA  He returns to the ER today complaining of worsening pain and redness overlying this extraperitoneal fluid collection  He has had persistent fevers are home for the last week, T max 102  No nausea or vomiting  Denies any other complaints  He has been voiding spontaneously without issue  The character of his urine has been normal       Procedures Performed: 4/13 IR drain placement      Summary of Hospital Course: Patient was admitted to the general surgery service and started on IV antibiotics  Interventional radiology was consulted for percutaneous drain placement  Montejo catheter was placed  Patient underwent placement of 10 [place] drain into a lower abdominal fluid collection on 4/13/2023  This revealed purulent slightly blood-tinged fluid  Patient was maintained on IV antibiotics and narrowed as cultures resulted  Cystogram was performed to evaluate for bladder injury, which did not reveal a bladder leak  His diet was advanced which he tolerated  Case management was engaged to aid in VNA for drain care  A portion of his lower midline wound superficially dehisced and this was cleansed with normal saline flushes and packed    On hospital day 2, 4/15/2023, patient " was deemed stable for discharge home  He was tolerating a diet, afebrile, no leukocytosis, and voiding spontaneously after Saleem catheter removal     ROS:  General ROS: negative for - chills, fatigue, fever or night sweats, weight loss  Respiratory ROS: no cough, shortness of breath, or wheezing  Cardiovascular ROS: no chest pain or dyspnea on exertion  Genito-Urinary ROS: no dysuria, trouble voiding, or hematuria  Musculoskeletal ROS: negative for - gait disturbance, joint pain or muscle pain  Neurological ROS: no TIA or stroke symptoms  GI ROS: see HPI  Skin ROS: no new rashes or lesions   Lymphatic ROS: no new adenopathy noted by pt  GYN ROS: see HPI, no new GYN history or bleeding noted  Psy ROS: no new mental or behavioral disturbances         Patient Active Problem List   Diagnosis   • Asthma   • Hepatitis C   • COPD (chronic obstructive pulmonary disease) (HCC)   • Incisional hernia   • Bladder perforation, intraoperative   • Abdominal pain       Allergies:  Bactrim [sulfamethoxazole-trimethoprim], Ciprofloxacin, Dust mite extract, and Sulfa antibiotics      Current Outpatient Medications:   •  naproxen (NAPROSYN) 500 mg tablet, Take 500 mg by mouth, Disp: , Rfl:   •  SODIUM CHLORIDE, EXTERNAL, (Saline Wound Wash) 0 9 % SOLN, 10 mL by Intracatheter route daily for 90 doses Intracatheter flushing daily  May substitute prefilled syringe with normal saline 10 mL vials, 10 mL syringes, and 18 g blunt needles,, Disp: 10 mL, Rfl: 20  •  BD PosiFlush 0 9 % SOLN, , Disp: , Rfl:   •  methocarbamol (ROBAXIN) 750 mg tablet, Take 1 tablet (750 mg total) by mouth every 6 (six) hours for 7 days (Patient not taking: Reported on 3/25/2023), Disp: 28 tablet, Rfl: 0  •  sodium chloride, PF, 0 9 %, 10 mL by Intracatheter route daily for 90 doses Intracatheter flushing daily   May substitute prefilled syringe with normal saline 10 mL vials, 10 mL syringes, and 18 g blunt needles (Patient not taking: Reported on 5/15/2023), Disp: 300 mL, Rfl: 2    Past Medical History:   Diagnosis Date   • Asthma    • Bladder injury    • Bronchitis    • COPD (chronic obstructive pulmonary disease) ([facility] [address] )    • COVID 10/2021   • Hepatitis C    • Infectious viral hepatitis    • Liver disease    • Pneumonia    • Tinnitus        Past Surgical History:   Procedure Laterality Date   • COLONOSCOPY W/ POLYPECTOMY  11/22/2022    6 MONTHS = MUST   • COLOSTOMY N/A 09/26/2022    Procedure: COLOSTOMY END;  Surgeon: Oh Boo MD;  [facility];  Service: General   • COLOSTOMY N/A 09/26/2022    Procedure: COLOSTOMY LOOP;  Surgeon: Oh Boo MD;  [facility];  Service: General   • FL CYSTOGRAM  4/5/2023   • FL CYSTOGRAM  4/14/2023   • INCISIONAL HERNIA REPAIR N/A 3/22/2023    Procedure: REPAIR COMPLEX INCISIONAL HERNIA  LAPAROSCOPIC WITH MESH, ROBOTIC ASSISTED, AND BLADDER [address];  Surgeon: Oh Boo MD;  [facility];  Service: General   • IR DRAINAGE TUBE CHECK/CHANGE/REPOSITION/REINSERTION/UPSIZE  4/28/2023   • IR DRAINAGE TUBE PLACEMENT  4/13/2023   • KNEE ARTHROSCOPY Left    • LAPAROTOMY N/A 09/26/2022    Procedure: LAPAROTOMY EXPLORATORY, LYSIS OF ADHESIONS, LOW ANTERIOR RESECTION WITH PRIMARY ANASTOMOSIS;  Surgeon: Oh Boo MD;  [facility];  Service: General   • MOUTH SURGERY     • VT CLOSURE ENTEROSTOMY LG/SMALL INTESTINE N/A 11/23/2022    Procedure: CLOSURE COLOSTOMY, LYSIS OF ADEHSIONS, RIGID SIGMOIDOSCOPY;  Surgeon: Oh Boo MD;  [facility];  Service: General   • VT COLECTOMY PARTIAL W/ANASTOMOSIS N/A 09/26/2022    Procedure: RESECTION COLON SIGMOID;  Surgeon: Oh Boo MD;  [facility];  Service: General   • VT [address] RGD DX W/WO COLLJ [address]/ [facility] N/A 09/26/2022    Procedure: SIGMOIDOSCOPY RIGID;  Surgeon: Oh Boo MD;  [facility];  Service: General       Family History   Problem Relation Age of Onset   • Diabetes Father • Heart disease Mother         reports that he has been smoking cigarettes  He has been smoking an average of  25 packs per day  He has never used smokeless tobacco  He reports that he does not currently use alcohol  He reports that he does not currently use drugs after having used the following drugs: Marijuana, Heroin, Methamphetamines, and Fentanyl  Vitals:    05/15/23 1021   BP: 100/70   Pulse: 78   Temp: (!) 97 2 °F (36 2 °C)       PHYSICAL EXAM  General: normal, cooperative, no distress  Abdominal: soft, non distended  Wound healing well  CHITRA drain pulled today in office with 5 cc serosangeuinous fluid     Incision: clean, dry, and intact and healing well        Elmer Zayas PA-C    Date: 5/15/2023 Time: 10:36 AM

## 2023-05-15 ENCOUNTER — OFFICE VISIT (OUTPATIENT)
Dept: SURGERY | Facility: CLINIC | Age: 58
End: 2023-05-15

## 2023-05-15 VITALS
HEIGHT: 71 IN | WEIGHT: 180.2 LBS | HEART RATE: 78 BPM | DIASTOLIC BLOOD PRESSURE: 70 MMHG | SYSTOLIC BLOOD PRESSURE: 100 MMHG | BODY MASS INDEX: 25.23 KG/M2 | TEMPERATURE: 97.2 F

## 2023-05-15 DIAGNOSIS — Z09 POSTOP CHECK: Primary | ICD-10-CM

## 2023-05-15 RX ORDER — NAPROXEN 500 MG/1
500 TABLET ORAL
COMMUNITY

## 2024-04-25 ENCOUNTER — HOSPITAL ENCOUNTER (EMERGENCY)
Facility: HOSPITAL | Age: 59
Discharge: HOME | End: 2024-04-25
Attending: EMERGENCY MEDICINE
Payer: COMMERCIAL

## 2024-04-25 ENCOUNTER — APPOINTMENT (EMERGENCY)
Dept: RADIOLOGY | Facility: HOSPITAL | Age: 59
End: 2024-04-25
Payer: COMMERCIAL

## 2024-04-25 VITALS
RESPIRATION RATE: 18 BRPM | DIASTOLIC BLOOD PRESSURE: 72 MMHG | SYSTOLIC BLOOD PRESSURE: 120 MMHG | HEART RATE: 76 BPM | WEIGHT: 173.06 LBS | HEIGHT: 71 IN | OXYGEN SATURATION: 95 % | TEMPERATURE: 98.2 F | BODY MASS INDEX: 24.23 KG/M2

## 2024-04-25 DIAGNOSIS — S51.819A FOREARM LACERATION: Primary | ICD-10-CM

## 2024-04-25 PROCEDURE — 90471 IMMUNIZATION ADMIN: CPT

## 2024-04-25 PROCEDURE — 12032 INTMD RPR S/A/T/EXT 2.6-7.5: CPT | Performed by: EMERGENCY MEDICINE

## 2024-04-25 PROCEDURE — 73090 X-RAY EXAM OF FOREARM: CPT

## 2024-04-25 PROCEDURE — 90715 TDAP VACCINE 7 YRS/> IM: CPT | Performed by: EMERGENCY MEDICINE

## 2024-04-25 PROCEDURE — 99284 EMERGENCY DEPT VISIT MOD MDM: CPT | Performed by: EMERGENCY MEDICINE

## 2024-04-25 PROCEDURE — 99283 EMERGENCY DEPT VISIT LOW MDM: CPT

## 2024-04-25 PROCEDURE — 96372 THER/PROPH/DIAG INJ SC/IM: CPT

## 2024-04-25 RX ORDER — LIDOCAINE HYDROCHLORIDE 10 MG/ML
10 INJECTION, SOLUTION EPIDURAL; INFILTRATION; INTRACAUDAL; PERINEURAL ONCE
Status: COMPLETED | OUTPATIENT
Start: 2024-04-25 | End: 2024-04-25

## 2024-04-25 RX ORDER — BACITRACIN, NEOMYCIN, POLYMYXIN B 400; 3.5; 5 [USP'U]/G; MG/G; [USP'U]/G
1 OINTMENT TOPICAL ONCE
Status: COMPLETED | OUTPATIENT
Start: 2024-04-25 | End: 2024-04-25

## 2024-04-25 RX ORDER — CEPHALEXIN 500 MG/1
500 CAPSULE ORAL EVERY 6 HOURS SCHEDULED
Qty: 20 CAPSULE | Refills: 0 | Status: SHIPPED | OUTPATIENT
Start: 2024-04-25 | End: 2024-04-30

## 2024-04-25 RX ORDER — KETOROLAC TROMETHAMINE 30 MG/ML
15 INJECTION, SOLUTION INTRAMUSCULAR; INTRAVENOUS ONCE
Status: COMPLETED | OUTPATIENT
Start: 2024-04-25 | End: 2024-04-25

## 2024-04-25 RX ORDER — CEPHALEXIN 250 MG/1
500 CAPSULE ORAL ONCE
Status: COMPLETED | OUTPATIENT
Start: 2024-04-25 | End: 2024-04-25

## 2024-04-25 RX ADMIN — LIDOCAINE HYDROCHLORIDE 10 ML: 10 INJECTION, SOLUTION EPIDURAL; INFILTRATION; INTRACAUDAL at 18:14

## 2024-04-25 RX ADMIN — KETOROLAC TROMETHAMINE 15 MG: 30 INJECTION, SOLUTION INTRAMUSCULAR; INTRAVENOUS at 18:11

## 2024-04-25 RX ADMIN — TETANUS TOXOID, REDUCED DIPHTHERIA TOXOID AND ACELLULAR PERTUSSIS VACCINE, ADSORBED 0.5 ML: 5; 2.5; 8; 8; 2.5 SUSPENSION INTRAMUSCULAR at 18:13

## 2024-04-25 RX ADMIN — BACITRACIN ZINC, NEOMYCIN, POLYMYXIN B 1 LARGE APPLICATION: 400; 3.5; 5 OINTMENT TOPICAL at 19:50

## 2024-04-25 RX ADMIN — CEPHALEXIN 500 MG: 250 CAPSULE ORAL at 19:50

## 2024-04-25 NOTE — ED PROVIDER NOTES
History  Chief Complaint   Patient presents with    Wrist Injury     Pt came in via EMS, pt reports working with a  (saw) and it just jump and got his L-wrist, bleeding control during triage. Wound covered per EMS about 2 in long.        History provided by:  Patient  Laceration  Length:  4  Depth:  Through underlying tissue  Quality: jagged    Bleeding: controlled    Time since incident:  1 hour  Injury mechanism: .  Pain details:     Severity:  Mild    Timing:  Constant    Progression:  Unchanged  Foreign body present:  No foreign bodies  Relieved by:  Nothing  Worsened by:  Nothing  Ineffective treatments:  None tried  Tetanus status:  Unknown  Associated symptoms: no fever and no rash        Prior to Admission Medications   Prescriptions Last Dose Informant Patient Reported? Taking?   BD PosiFlush 0.9 % SOLN Not Taking Self Yes No   Patient not taking: Reported on 5/15/2023   SODIUM CHLORIDE, EXTERNAL, (Saline Wound Wash) 0.9 % SOLN Not Taking Self No No   Sig: 10 mL by Intracatheter route daily for 90 doses Intracatheter flushing daily. May substitute prefilled syringe with normal saline 10 mL vials, 10 mL syringes, and 18 g blunt needles,   Patient not taking: Reported on 4/25/2024   methocarbamol (ROBAXIN) 750 mg tablet   No No   Sig: Take 1 tablet (750 mg total) by mouth every 6 (six) hours for 7 days   Patient not taking: Reported on 3/25/2023   naproxen (NAPROSYN) 500 mg tablet Not Taking Self Yes No   Sig: Take 500 mg by mouth   Patient not taking: Reported on 4/25/2024   sodium chloride, PF, 0.9 %  Self No No   Sig: 10 mL by Intracatheter route daily for 90 doses Intracatheter flushing daily. May substitute prefilled syringe with normal saline 10 mL vials, 10 mL syringes, and 18 g blunt needles   Patient not taking: Reported on 5/15/2023      Facility-Administered Medications: None       Past Medical History:   Diagnosis Date    Asthma     Bladder injury     Bronchitis     COPD (chronic  obstructive pulmonary disease) (HCC)     COVID 10/2021    Hepatitis C     Infectious viral hepatitis     Liver disease     Pneumonia     Tinnitus        Past Surgical History:   Procedure Laterality Date    COLONOSCOPY W/ POLYPECTOMY  11/22/2022    6 MONTHS = MUST    COLOSTOMY N/A 09/26/2022    Procedure: COLOSTOMY END;  Surgeon: Adriana Thacker MD;  [facility];  Service: General    COLOSTOMY N/A 09/26/2022    Procedure: COLOSTOMY LOOP;  Surgeon: Adriana Thacker MD;  [facility];  Service: General    FL CYSTOGRAM  4/5/2023    FL CYSTOGRAM  4/14/2023    INCISIONAL HERNIA REPAIR N/A 3/22/2023    Procedure: REPAIR COMPLEX INCISIONAL HERNIA  LAPAROSCOPIC WITH MESH, ROBOTIC ASSISTED, AND BLADDER PERFERATION REPAIR;  Surgeon: Adriana Thacker MD;  [facility];  Service: General    IR DRAINAGE TUBE CHECK/CHANGE/REPOSITION/REINSERTION/UPSIZE  4/28/2023    IR DRAINAGE TUBE PLACEMENT  4/13/2023    KNEE ARTHROSCOPY Left     LAPAROTOMY N/A 09/26/2022    Procedure: LAPAROTOMY EXPLORATORY, LYSIS OF ADHESIONS, LOW ANTERIOR RESECTION WITH PRIMARY ANASTOMOSIS;  Surgeon: Adriana Thacker MD;  [facility];  Service: General    MOUTH SURGERY      IL CLOSURE ENTEROSTOMY LG/SMALL INTESTINE N/A 11/23/2022    Procedure: CLOSURE COLOSTOMY, LYSIS OF ADEHSIONS, RIGID SIGMOIDOSCOPY;  Surgeon: Adriana Thacker MD;  [facility];  Service: General    IL COLECTOMY PARTIAL W/ANASTOMOSIS N/A 09/26/2022    Procedure: RESECTION COLON SIGMOID;  Surgeon: Adriana Thacker MD;  [facility];  Service: General    IL PROCTOSGMDSC RGD DX W/WO COLLJ SPEC BR/WA SPX N/A 09/26/2022    Procedure: SIGMOIDOSCOPY RIGID;  Surgeon: Adriana Thacker MD;  [facility];  Service: General       Family History   Problem Relation Age of Onset    Diabetes Father     Heart disease Mother      I have reviewed and agree with the history as documented.    E-Cigarette/Vaping    E-Cigarette Use Never User       E-Cigarette/Vaping Substances    Nicotine No     THC No     CBD No     Flavoring No     Other No     Unknown No      Social History     Tobacco Use    Smoking status: Every Day     Current packs/day: 0.25     Types: Cigarettes    Smokeless tobacco: Never    Tobacco comments:     Last smoked today 3.2.23   Vaping Use    Vaping status: Never Used   Substance Use Topics    Alcohol use: Not Currently    Drug use: Not Currently     Types: Marijuana, Heroin, Methamphetamines, Fentanyl     Comment: nothing since 12/20/2020       Review of Systems   Constitutional:  Negative for activity change, appetite change, fatigue and fever.   HENT:  Negative for congestion, dental problem, ear pain, rhinorrhea and sore throat.    Eyes:  Negative for pain and redness.   Respiratory:  Negative for chest tightness, shortness of breath and wheezing.    Cardiovascular:  Negative for chest pain and palpitations.   Gastrointestinal:  Negative for abdominal pain, blood in stool, constipation, diarrhea, nausea and vomiting.   Endocrine: Negative for cold intolerance and heat intolerance.   Genitourinary:  Negative for dysuria, frequency and hematuria.   Musculoskeletal:  Negative for arthralgias and myalgias.   Skin:  Positive for wound. Negative for color change, pallor and rash.   Neurological:  Negative for weakness and numbness.   Hematological:  Does not bruise/bleed easily.   Psychiatric/Behavioral:  Negative for agitation, hallucinations and suicidal ideas.        Physical Exam  Physical Exam  Vitals and nursing note reviewed.   Constitutional:       Appearance: He is well-developed.   HENT:      Mouth/Throat:      Pharynx: No oropharyngeal exudate.   Eyes:      Conjunctiva/sclera: Conjunctivae normal.   Neck:      Comments: No meningeal signs  Cardiovascular:      Rate and Rhythm: Normal rate and regular rhythm.      Heart sounds: Normal heart sounds.   Pulmonary:      Effort: Pulmonary effort is normal. No respiratory distress.       Breath sounds: Normal breath sounds. No wheezing or rales.   Chest:      Chest wall: No tenderness.   Abdominal:      General: Bowel sounds are normal. There is no distension.      Palpations: Abdomen is soft. There is no mass.      Tenderness: There is no abdominal tenderness.      Hernia: No hernia is present.      Comments: No cvat   Musculoskeletal:         General: Normal range of motion.      Cervical back: Normal range of motion and neck supple.   Lymphadenopathy:      Cervical: No cervical adenopathy.   Skin:         Neurological:      Mental Status: He is alert and oriented to person, place, and time.      Cranial Nerves: No cranial nerve deficit.   Psychiatric:         Behavior: Behavior normal.         Vital Signs  ED Triage Vitals [04/25/24 1738]   Temperature Pulse Respirations Blood Pressure SpO2   98.2 °F (36.8 °C) 68 18 136/88 95 %      Temp Source Heart Rate Source Patient Position - Orthostatic VS BP Location FiO2 (%)   Oral Monitor Sitting Right arm --      Pain Score       8           Vitals:    04/25/24 1738 04/25/24 1951   BP: 136/88 120/72   Pulse: 68 76   Patient Position - Orthostatic VS: Sitting Lying         Visual Acuity      ED Medications  Medications   tetanus-diphtheria-acellular pertussis (BOOSTRIX) IM injection 0.5 mL (0.5 mL Intramuscular Given 4/25/24 1813)   ketorolac (TORADOL) injection 15 mg (15 mg Intramuscular Given 4/25/24 1811)   lidocaine (PF) (XYLOCAINE-MPF) 1 % injection 10 mL (10 mL Infiltration Given by Other 4/25/24 1814)   cephalexin (KEFLEX) capsule 500 mg (500 mg Oral Given 4/25/24 1950)   neomycin-bacitracin-polymyxin b (NEOSPORIN) ointment 1 large application (1 large application Topical Given 4/25/24 1950)       Diagnostic Studies  Results Reviewed       None                   XR forearm 2 views LEFT   ED Interpretation by Ramsey Keene MD (04/25 1832)   Primary reviewed:No radiopaque foreign body                   Procedures  Universal  Protocol:  Procedure performed by:  Consent: Verbal consent obtained.  Risks and benefits: risks, benefits and alternatives were discussed  Consent given by: patient  Timeout called at: 4/25/2024 7:37 PM.  Patient understanding: patient states understanding of the procedure being performed  Patient consent: the patient's understanding of the procedure matches consent given  Procedure consent: procedure consent matches procedure scheduled  Required items: required blood products, implants, devices, and special equipment available  Patient identity confirmed: verbally with patient  Laceration repair    Date/Time: 4/25/2024 7:37 PM    Performed by: Ramsey Keene MD  Authorized by: Ramsey Keene MD  Location: L forearm.  Laceration length: 4 cm  Foreign bodies: unknown  Tendon involvement: none  Nerve involvement: none  Anesthesia: local infiltration    Anesthesia:  Local Anesthetic: lidocaine 1% without epinephrine  Anesthetic total: 6 mL    Sedation:  Patient sedated: no      Wound Dehiscence:    Secondary closure or dehiscence: complex    Procedure Details:  Irrigation solution: saline  Irrigation method: jet lavage and syringe  Amount of cleaning: extensive  Skin closure: 3-0 nylon  Subcutaneous closure: 3-0 Chromic gut  Number of sutures: 3 deep, 7 superficial.  Technique: buried suture, simple and vertical mattress  Approximation: close  Approximation difficulty: complex  Dressing: antibiotic ointment and 4x4 sterile gauze               ED Course                               SBIRT 22yo+      Flowsheet Row Most Recent Value   Initial Alcohol Screen: US AUDIT-C     1. How often do you have a drink containing alcohol? 0 Filed at: 04/25/2024 1741   2. How many drinks containing alcohol do you have on a typical day you are drinking?  0 Filed at: 04/25/2024 1741   3a. Male UNDER 65: How often do you have five or more drinks on one occasion? 0 Filed at: 04/25/2024 1741   Audit-C Score 0 Filed at: 04/25/2024 1741    BREANNA: How many times in the past year have you...    Used an illegal drug or used a prescription medication for non-medical reasons? Never Filed at: 04/25/2024 1741                      Medical Decision Making  L forearm laceration no evidence of tendon injury-will do xray to r/o foreign body laceration repair    Amount and/or Complexity of Data Reviewed  Radiology: ordered and independent interpretation performed.    Risk  OTC drugs.  Prescription drug management.             Disposition  Final diagnoses:   Forearm laceration     Time reflects when diagnosis was documented in both MDM as applicable and the Disposition within this note       Time User Action Codes Description Comment    4/25/2024  7:40 PM Ramsey Keene Add [S51.819A] Forearm laceration           ED Disposition       ED Disposition   Discharge    Condition   Stable    Date/Time   Thu Apr 25, 2024 1939    Comment   Abdi Owen discharge to home/self care.                   Follow-up Information       Follow up With Specialties Details Why Contact Info    GRAY Jenkins MD Internal Medicine Go in 2 days For wound re-check [address]  [address]  [place] [address]  [phone]      GRAY Jenkins MD Internal Medicine Go in 1 week For suture removal [address]  [address]  [place] [address]  [phone]              Discharge Medication List as of 4/25/2024  7:40 PM        START taking these medications    Details   cephalexin (KEFLEX) 500 mg capsule Take 1 capsule (500 mg total) by mouth every 6 (six) hours for 5 days, Starting Thu 4/25/2024, Until Tue 4/30/2024, Normal           CONTINUE these medications which have NOT CHANGED    Details   BD PosiFlush 0.9 % SOLN Starting Mon 4/24/2023, Historical Med      methocarbamol (ROBAXIN) 750 mg tablet Take 1 tablet (750 mg total) by mouth every 6 (six) hours for 7 days, Starting Thu 3/23/2023, Until Thu 3/30/2023, Normal      naproxen (NAPROSYN) 500 mg tablet  Take 500 mg by mouth, Historical Med      SODIUM CHLORIDE, EXTERNAL, (Saline Wound Wash) 0.9 % SOLN 10 mL by Intracatheter route daily for 90 doses Intracatheter flushing daily. May substitute prefilled syringe with normal saline 10 mL vials, 10 mL syringes, and 18 g blunt needles,, Normal      sodium chloride, PF, 0.9 % 10 mL by Intracatheter route daily for 90 doses Intracatheter flushing daily. May substitute prefilled syringe with normal saline 10 mL vials, 10 mL syringes, and 18 g blunt needles, Starting Thu 4/20/2023, Until Wed 7/19/2023, Normal             No discharge procedures on file.    PDMP Review         Value Time User    PDMP Reviewed  Yes 9/29/2022  8:45 AM Liv Marinelli PA-C            ED Provider  Electronically Signed by             Ramsey Keene MD  04/25/24 2008

## 2024-05-06 ENCOUNTER — APPOINTMENT (EMERGENCY)
Dept: CT IMAGING | Facility: HOSPITAL | Age: 59
End: 2024-05-06
Payer: COMMERCIAL

## 2024-05-06 ENCOUNTER — HOSPITAL ENCOUNTER (EMERGENCY)
Facility: HOSPITAL | Age: 59
Discharge: HOME | End: 2024-05-07
Attending: EMERGENCY MEDICINE
Payer: COMMERCIAL

## 2024-05-06 DIAGNOSIS — Z51.89 VISIT FOR WOUND CHECK: ICD-10-CM

## 2024-05-06 DIAGNOSIS — I80.8 SUPERFICIAL THROMBOPHLEBITIS OF LEFT UPPER EXTREMITY: Primary | ICD-10-CM

## 2024-05-06 LAB
ALBUMIN SERPL BCP-MCNC: 4.3 G/DL (ref 3.5–5)
ALP SERPL-CCNC: 51 U/L (ref 34–104)
ALT SERPL W P-5'-P-CCNC: 19 U/L (ref 7–52)
ANION GAP SERPL CALCULATED.3IONS-SCNC: 6 MMOL/L (ref 4–13)
AST SERPL W P-5'-P-CCNC: 24 U/L (ref 13–39)
BILIRUB SERPL-MCNC: 0.4 MG/DL (ref 0.2–1)
BUN SERPL-MCNC: 13 MG/DL (ref 5–25)
CALCIUM SERPL-MCNC: 10 MG/DL (ref 8.4–10.2)
CHLORIDE SERPL-SCNC: 103 MMOL/L (ref 96–108)
CO2 SERPL-SCNC: 29 MMOL/L (ref 21–32)
CREAT SERPL-MCNC: 1.02 MG/DL (ref 0.6–1.3)
CRP SERPL QL: 8.6 MG/L
ERYTHROCYTE [DISTWIDTH] IN BLOOD BY AUTOMATED COUNT: 12.6 % (ref 11.6–15.1)
ERYTHROCYTE [SEDIMENTATION RATE] IN BLOOD: 5 MM/HOUR (ref 0–19)
GFR SERPL CREATININE-BSD FRML MDRD: 81 ML/MIN/1.73SQ M
GLUCOSE SERPL-MCNC: 107 MG/DL (ref 65–140)
HCT VFR BLD AUTO: 42.8 % (ref 36.5–49.3)
HGB BLD-MCNC: 15.2 G/DL (ref 12–17)
MCH RBC QN AUTO: 30.6 PG (ref 26.8–34.3)
MCHC RBC AUTO-ENTMCNC: 35.5 G/DL (ref 31.4–37.4)
MCV RBC AUTO: 86 FL (ref 82–98)
PLATELET # BLD AUTO: 196 THOUSANDS/UL (ref 149–390)
PMV BLD AUTO: 10.2 FL (ref 8.9–12.7)
POTASSIUM SERPL-SCNC: 4 MMOL/L (ref 3.5–5.3)
PROT SERPL-MCNC: 7.2 G/DL (ref 6.4–8.4)
RBC # BLD AUTO: 4.97 MILLION/UL (ref 3.88–5.62)
SODIUM SERPL-SCNC: 138 MMOL/L (ref 135–147)
WBC # BLD AUTO: 7.88 THOUSAND/UL (ref 4.31–10.16)

## 2024-05-06 PROCEDURE — 85027 COMPLETE CBC AUTOMATED: CPT

## 2024-05-06 PROCEDURE — 73201 CT UPPER EXTREMITY W/DYE: CPT

## 2024-05-06 PROCEDURE — 99285 EMERGENCY DEPT VISIT HI MDM: CPT

## 2024-05-06 PROCEDURE — 85652 RBC SED RATE AUTOMATED: CPT

## 2024-05-06 PROCEDURE — 99284 EMERGENCY DEPT VISIT MOD MDM: CPT

## 2024-05-06 PROCEDURE — 96365 THER/PROPH/DIAG IV INF INIT: CPT

## 2024-05-06 PROCEDURE — 36415 COLL VENOUS BLD VENIPUNCTURE: CPT

## 2024-05-06 PROCEDURE — 86140 C-REACTIVE PROTEIN: CPT

## 2024-05-06 PROCEDURE — 80053 COMPREHEN METABOLIC PANEL: CPT

## 2024-05-06 RX ORDER — CEFAZOLIN SODIUM 1 G/50ML
1000 SOLUTION INTRAVENOUS ONCE
Status: COMPLETED | OUTPATIENT
Start: 2024-05-06 | End: 2024-05-06

## 2024-05-06 RX ADMIN — CEFAZOLIN SODIUM 1000 MG: 1 SOLUTION INTRAVENOUS at 23:03

## 2024-05-06 RX ADMIN — IOHEXOL 100 ML: 350 INJECTION, SOLUTION INTRAVENOUS at 23:40

## 2024-05-07 VITALS
WEIGHT: 172.4 LBS | RESPIRATION RATE: 18 BRPM | SYSTOLIC BLOOD PRESSURE: 106 MMHG | BODY MASS INDEX: 24.04 KG/M2 | DIASTOLIC BLOOD PRESSURE: 67 MMHG | HEART RATE: 75 BPM | OXYGEN SATURATION: 93 % | TEMPERATURE: 97.8 F

## 2024-05-07 RX ORDER — IBUPROFEN 600 MG/1
600 TABLET ORAL ONCE
Status: COMPLETED | OUTPATIENT
Start: 2024-05-07 | End: 2024-05-07

## 2024-05-07 RX ADMIN — IBUPROFEN 600 MG: 600 TABLET, FILM COATED ORAL at 00:38

## 2024-05-07 NOTE — DISCHARGE INSTRUCTIONS
- Apply warm compress, 15 minutes at a time. Elevate   - Wear ace wrap  - Compress with ace wrap  - Take ibuprofen 2-3x/day for pain and swelling. Do not exceed 2400mg. Stay well hydrated.   - Return to ER as needed for above symptoms

## 2024-05-07 NOTE — ED PROVIDER NOTES
History  Chief Complaint   Patient presents with    Wound Check     Pt reports stiches to L arm 11 days ago. Removed stitches himself on Friday. C/o redness and pain to site.      57 yo male who presents for a wound check. Patient removed his sutures on 5/3 with new nail clipper. States he removed 7 sutures. He states he needs antibiotics because its red and swollen, this started today. No fever. Minimal pain, tender to palpation though. Per chart review, patient had 3 deep sutures placed and 7 superficial sutures placed by Dr. Keene on 4/25 for L forearm lac after a saw malfunctioned and cut his wrist. Patient was given tdap and antibacterial ointment at time of this visit.      Wound Check   There has been no drainage from the wound. He has no difficulty moving the affected extremity or digit.       Prior to Admission Medications   Prescriptions Last Dose Informant Patient Reported? Taking?   BD PosiFlush 0.9 % SOLN  Self Yes No   Patient not taking: Reported on 5/15/2023   SODIUM CHLORIDE, EXTERNAL, (Saline Wound Wash) 0.9 % SOLN  Self No No   Sig: 10 mL by Intracatheter route daily for 90 doses Intracatheter flushing daily. May substitute prefilled syringe with normal saline 10 mL vials, 10 mL syringes, and 18 g blunt needles,   Patient not taking: Reported on 4/25/2024   methocarbamol (ROBAXIN) 750 mg tablet   No No   Sig: Take 1 tablet (750 mg total) by mouth every 6 (six) hours for 7 days   Patient not taking: Reported on 3/25/2023   naproxen (NAPROSYN) 500 mg tablet  Self Yes No   Sig: Take 500 mg by mouth   Patient not taking: Reported on 4/25/2024   sodium chloride, PF, 0.9 %  Self No No   Sig: 10 mL by Intracatheter route daily for 90 doses Intracatheter flushing daily. May substitute prefilled syringe with normal saline 10 mL vials, 10 mL syringes, and 18 g blunt needles   Patient not taking: Reported on 5/15/2023      Facility-Administered Medications: None       Past Medical History:   Diagnosis Date     Asthma     Bladder injury     Bronchitis     COPD (chronic obstructive pulmonary disease) (HCC)     COVID 10/2021    Hepatitis C     Infectious viral hepatitis     Liver disease     Pneumonia     Tinnitus        Past Surgical History:   Procedure Laterality Date    COLONOSCOPY W/ POLYPECTOMY  11/22/2022    6 MONTHS = MUST    COLOSTOMY N/A 09/26/2022    Procedure: COLOSTOMY END;  Surgeon: Adriana Thacker MD;  Location[facility];  Service: General    COLOSTOMY N/A 09/26/2022    Procedure: COLOSTOMY LOOP;  Surgeon: Adriana Thacker MD;  [facility];  Service: General    FL CYSTOGRAM  4/5/2023    FL CYSTOGRAM  4/14/2023    INCISIONAL HERNIA REPAIR N/A 3/22/2023    Procedure: REPAIR COMPLEX INCISIONAL HERNIA  LAPAROSCOPIC WITH MESH, ROBOTIC ASSISTED, AND BLADDER PERFERATION REPAIR;  Surgeon: Adriana Thacker MD;  [facility];  Service: General    IR DRAINAGE TUBE CHECK/CHANGE/REPOSITION/REINSERTION/UPSIZE  4/28/2023    IR DRAINAGE TUBE PLACEMENT  4/13/2023    KNEE ARTHROSCOPY Left     LAPAROTOMY N/A 09/26/2022    Procedure: LAPAROTOMY EXPLORATORY, LYSIS OF ADHESIONS, LOW ANTERIOR RESECTION WITH PRIMARY ANASTOMOSIS;  Surgeon: Adriana Thacker MD;  [facility];  Service: General    MOUTH SURGERY      CT CLOSURE ENTEROSTOMY LG/SMALL INTESTINE N/A 11/23/2022    Procedure: CLOSURE COLOSTOMY, LYSIS OF ADEHSIONS, RIGID SIGMOIDOSCOPY;  Surgeon: Adriana Thacker MD;  [facility];  Service: General    CT COLECTOMY PARTIAL W/ANASTOMOSIS N/A 09/26/2022    Procedure: RESECTION COLON SIGMOID;  Surgeon: Adriana Thacker MD;  [facility];  Service: General    CT PROCTOSGMDSC RGD DX W/WO COLLJ SPEC BR/WA SPX N/A 09/26/2022    Procedure: SIGMOIDOSCOPY RIGID;  Surgeon: Adriana Thacker MD;  [facility];  Service: General       Family History   Problem Relation Age of Onset    Diabetes Father     Heart disease Mother      I have reviewed and agree with the history as  documented.    E-Cigarette/Vaping    E-Cigarette Use Never User      E-Cigarette/Vaping Substances    Nicotine No     THC No     CBD No     Flavoring No     Other No     Unknown No      Social History     Tobacco Use    Smoking status: Every Day     Current packs/day: 0.25     Types: Cigarettes    Smokeless tobacco: Never    Tobacco comments:     Last smoked today 3.2.23   Vaping Use    Vaping status: Never Used   Substance Use Topics    Alcohol use: Not Currently    Drug use: Not Currently     Types: Marijuana, Heroin, Methamphetamines, Fentanyl     Comment: nothing since 12/20/2020       Review of Systems   Constitutional:  Negative for chills and fever.   HENT:  Negative for ear pain and sore throat.    Eyes:  Negative for pain and visual disturbance.   Respiratory:  Negative for cough and shortness of breath.    Cardiovascular:  Negative for chest pain and palpitations.   Gastrointestinal:  Negative for abdominal pain and vomiting.   Genitourinary:  Negative for dysuria and hematuria.   Musculoskeletal:  Negative for arthralgias and back pain.   Skin:  Positive for color change and wound. Negative for rash.   Neurological:  Negative for seizures and syncope.   All other systems reviewed and are negative.      Physical Exam  Physical Exam  Vitals and nursing note reviewed.   Constitutional:       General: He is not in acute distress.     Appearance: He is well-developed.   HENT:      Head: Normocephalic and atraumatic.   Eyes:      Conjunctiva/sclera: Conjunctivae normal.   Cardiovascular:      Rate and Rhythm: Normal rate and regular rhythm.      Heart sounds: No murmur heard.  Pulmonary:      Effort: Pulmonary effort is normal. No respiratory distress.      Breath sounds: Normal breath sounds.   Abdominal:      Palpations: Abdomen is soft.      Tenderness: There is no abdominal tenderness.   Musculoskeletal:         General: No swelling.      Cervical back: Neck supple.      Comments: 5/5  strength and FROM  to flexion, extension,   Interossei muscle inn tact   Skin:     General: Skin is warm and dry.      Capillary Refill: Capillary refill takes less than 2 seconds.      Comments: Small 2cm forearm collection to anteromedial aspect   Able to appreciate clear ends of induration/tracking along vessel  No fluctuance, odor, erythema, drainage      Neurological:      Mental Status: He is alert.      Comments: Decreased sensation to L anterior forearm, states this is chronic since injury    In tact sensation to R anterior forearm    Psychiatric:         Mood and Affect: Mood normal.         Vital Signs  ED Triage Vitals   Temperature Pulse Respirations Blood Pressure SpO2   05/06/24 2237 05/06/24 2237 05/06/24 2237 05/06/24 2237 05/06/24 2237   97.8 °F (36.6 °C) 93 18 126/81 94 %      Temp Source Heart Rate Source Patient Position - Orthostatic VS BP Location FiO2 (%)   05/06/24 2237 05/06/24 2237 05/06/24 2237 05/06/24 2237 --   Oral Monitor Sitting Right arm       Pain Score       05/07/24 0038       5           Vitals:    05/06/24 2237 05/07/24 0000 05/07/24 0015   BP: 126/81 109/68 106/67   Pulse: 93 74 75   Patient Position - Orthostatic VS: Sitting Lying          Visual Acuity      ED Medications  Medications   ceFAZolin (ANCEF) IVPB (premix in dextrose) 1,000 mg 50 mL (0 mg Intravenous Stopped 5/6/24 2333)   iohexol (OMNIPAQUE) 350 MG/ML injection (MULTI-DOSE) 100 mL (100 mL Intravenous Given 5/6/24 2340)   ibuprofen (MOTRIN) tablet 600 mg (600 mg Oral Given 5/7/24 0038)       Diagnostic Studies  Results Reviewed       Procedure Component Value Units Date/Time    C-reactive protein [[id number]]  (Abnormal) Collected: 05/06/24 2258    Lab Status: Final result Specimen: Blood from Arm, Right Updated: 05/06/24 2318     CRP 8.6 mg/L     Comprehensive metabolic panel [[id number]] Collected: 05/06/24 2258    Lab Status: Final result Specimen: Blood from Arm, Right Updated: 05/06/24 2318     Sodium 138 mmol/L      Potassium  4.0 mmol/L      Chloride 103 mmol/L      CO2 29 mmol/L      ANION GAP 6 mmol/L      BUN 13 mg/dL      Creatinine 1.02 mg/dL      Glucose 107 mg/dL      Calcium 10.0 mg/dL      AST 24 U/L      ALT 19 U/L      Alkaline Phosphatase 51 U/L      Total Protein 7.2 g/dL      Albumin 4.3 g/dL      Total Bilirubin 0.40 mg/dL      eGFR 81 ml/min/1.73sq m     Narrative:      National Kid[employer] Foundation guidelines for Chronic Kidney Disease (CKD):     Stage 1 with normal or high GFR (GFR > 90 mL/min/1.73 square meters)    Stage 2 Mild CKD (GFR = 60-89 mL/min/1.73 square meters)    Stage 3A Moderate CKD (GFR = 45-59 mL/min/1.73 square meters)    Stage 3B Moderate CKD (GFR = 30-44 mL/min/1.73 square meters)    Stage 4 Severe CKD (GFR = 15-29 mL/min/1.73 square meters)    Stage 5 End Stage CKD (GFR <15 mL/min/1.73 square meters)  Note: GFR calculation is accurate only with a steady state creatinine    Sedimentation rate, automated [[id number]]  (Normal) Collected: 05/06/24 2258    Lab Status: Final result Specimen: Blood from Arm, Right Updated: 05/06/24 2305     Sed Rate 5 mm/hour     CBC [[id number]]  (Normal) Collected: 05/06/24 2258    Lab Status: Final result Specimen: Blood from Arm, Right Updated: 05/06/24 2303     WBC 7.88 Thousand/uL      RBC 4.97 Million/uL      Hemoglobin 15.2 g/dL      Hematocrit 42.8 %      MCV 86 fL      MCH 30.6 pg      MCHC 35.5 g/dL      RDW 12.6 %      Platelets 196 Thousands/uL      MPV 10.2 fL                    CT upper extremity w contrast left   Final Result by Rossy Westfall MD (05/07 0011)      No evidence of abscess. Superficial laceration with subcutaneous inflammatory change about the radial aspect of volar forearm.            Workstation performed: [id number]                    Procedures  Procedures         ED Course  ED Course as of 05/10/24 1144   Mon May 06, 2024   2319 CBC  WNL   2319 Comprehensive metabolic panel  Mildly elevated Cr from baseline of 0.7. Stable for CT. All  WNL.    2319 C-reactive protein(!)   2319 Sedimentation rate, automated   Tue May 07, 2024   0014 CT upper extremity w contrast left  Negative on my read and radiology read.                               SBIRT 20yo+      Flowsheet Row Most Recent Value   Initial Alcohol Screen: US AUDIT-C     1. How often do you have a drink containing alcohol? 0 Filed at: 05/06/2024 2239   2. How many drinks containing alcohol do you have on a typical day you are drinking?  0 Filed at: 05/06/2024 2239   3a. Male UNDER 65: How often do you have five or more drinks on one occasion? 0 Filed at: 05/06/2024 2239   Audit-C Score 0 Filed at: 05/06/2024 2239   BREANNA: How many times in the past year have you...    Used an illegal drug or used a prescription medication for non-medical reasons? Never Filed at: 05/06/2024 2239                      Medical Decision Making  Ddx included but wasn't limited to local wound infection, abscess, nec fasc, superficial thrombophlebitis.     CT performed due to palpable         Amount and/or Complexity of Data Reviewed  Labs: ordered. Decision-making details documented in ED Course.  Radiology: ordered. Decision-making details documented in ED Course.    Risk  Prescription drug management.             Disposition  Final diagnoses:   Visit for wound check   Superficial thrombophlebitis of left upper extremity     Time reflects when diagnosis was documented in both MDM as applicable and the Disposition within this note       Time User Action Codes Description Comment    5/7/2024 12:15 AM Jeanne Coyle Add [S41.102A] Arm wound, left, initial encounter     5/7/2024 12:19 AM Jeanne Coyle Add [Z51.89] Visit for wound check     5/7/2024 12:19 AM Jeanne Coyle Modify [Z51.89] Visit for wound check     5/7/2024 12:19 AM Jeanne Coyle Remove [S41.102A] Arm wound, left, initial encounter     5/7/2024 12:23 AM Jeanne Coyle Add [I80.8] Superficial thrombophlebitis of left upper extremity      5/7/2024 12:23 AM Jeanne Coyle Modify [Z51.89] Visit for wound check     5/7/2024 12:23 AM Jeanne Coyle Modify [I80.8] Superficial thrombophlebitis of left upper extremity           ED Disposition       ED Disposition   Discharge    Condition   Stable    Date/Time   Tue May 7, 2024 0015    Comment   Abdi VARELA Brayden discharge to home/self care.                   Follow-up Information       Follow up With Specialties Details Why Contact Info Additional Information    GRAY Jenkins MD Internal Medicine Schedule an appointment as soon as possible for a visit   [address]  [address]  [facility] [address]  [phone]       [facility] Emergency Medicine  If symptoms worsen- increased redness, swelling, new drainage, worsened pain, inability to move fingers or decreased sensation [address] [id number]  [phone] [facility] Emergency Department, [address], [place], [address]            Discharge Medication List as of 5/7/2024 12:26 AM        CONTINUE these medications which have NOT CHANGED    Details   BD PosiFlush 0.9 % SOLN Starting Mon 4/24/2023, Historical Med      methocarbamol (ROBAXIN) 750 mg tablet Take 1 tablet (750 mg total) by mouth every 6 (six) hours for 7 days, Starting Thu 3/23/2023, Until Thu 3/30/2023, Normal      naproxen (NAPROSYN) 500 mg tablet Take 500 mg by mouth, Historical Med      SODIUM CHLORIDE, EXTERNAL, (Saline Wound Wash) 0.9 % SOLN 10 mL by Intracatheter route daily for 90 doses Intracatheter flushing daily. May substitute prefilled syringe with normal saline 10 mL vials, 10 mL syringes, and 18 g blunt needles,, Normal      sodium chloride, PF, 0.9 % 10 mL by Intracatheter route daily for 90 doses Intracatheter flushing daily. May substitute prefilled syringe with normal saline 10 mL vials, 10 mL syringes, and 18 g blunt needles, Starting Thu 4/20/2023, Until  Wed 7/19/2023, Normal             No discharge procedures on file.    PDMP Review         Value Time User    PDMP Reviewed  Yes 9/29/2022  8:45 AM Liv Marinelli PA-C            ED Provider  Electronically Signed by             Jeanne Coyle PA-C  05/10/24 1140

## 2024-10-02 ENCOUNTER — APPOINTMENT (EMERGENCY)
Dept: RADIOLOGY | Facility: HOSPITAL | Age: 59
End: 2024-10-02
Payer: COMMERCIAL

## 2024-10-02 ENCOUNTER — HOSPITAL ENCOUNTER (EMERGENCY)
Facility: HOSPITAL | Age: 59
Discharge: HOME | End: 2024-10-02
Attending: EMERGENCY MEDICINE
Payer: COMMERCIAL

## 2024-10-02 VITALS
BODY MASS INDEX: 23.52 KG/M2 | SYSTOLIC BLOOD PRESSURE: 156 MMHG | OXYGEN SATURATION: 94 % | HEART RATE: 120 BPM | HEIGHT: 71 IN | DIASTOLIC BLOOD PRESSURE: 88 MMHG | TEMPERATURE: 99.2 F | RESPIRATION RATE: 20 BRPM | WEIGHT: 167.99 LBS

## 2024-10-02 DIAGNOSIS — J44.9 COPD (CHRONIC OBSTRUCTIVE PULMONARY DISEASE) (HCC): ICD-10-CM

## 2024-10-02 DIAGNOSIS — R05.9 COUGH: Primary | ICD-10-CM

## 2024-10-02 LAB
FLUAV AG UPPER RESP QL IA.RAPID: NEGATIVE
FLUBV AG UPPER RESP QL IA.RAPID: NEGATIVE
SARS-COV+SARS-COV-2 AG RESP QL IA.RAPID: NEGATIVE

## 2024-10-02 PROCEDURE — 87804 INFLUENZA ASSAY W/OPTIC: CPT | Performed by: EMERGENCY MEDICINE

## 2024-10-02 PROCEDURE — 71046 X-RAY EXAM CHEST 2 VIEWS: CPT

## 2024-10-02 PROCEDURE — 87811 SARS-COV-2 COVID19 W/OPTIC: CPT | Performed by: EMERGENCY MEDICINE

## 2024-10-02 PROCEDURE — 94640 AIRWAY INHALATION TREATMENT: CPT

## 2024-10-02 PROCEDURE — 99284 EMERGENCY DEPT VISIT MOD MDM: CPT | Performed by: EMERGENCY MEDICINE

## 2024-10-02 PROCEDURE — 99284 EMERGENCY DEPT VISIT MOD MDM: CPT

## 2024-10-02 RX ORDER — ALBUTEROL SULFATE 90 UG/1
2 INHALANT RESPIRATORY (INHALATION) ONCE
Status: COMPLETED | OUTPATIENT
Start: 2024-10-02 | End: 2024-10-02

## 2024-10-02 RX ORDER — ALBUTEROL SULFATE 0.83 MG/ML
5 SOLUTION RESPIRATORY (INHALATION) ONCE
Status: COMPLETED | OUTPATIENT
Start: 2024-10-02 | End: 2024-10-02

## 2024-10-02 RX ORDER — AZITHROMYCIN 250 MG/1
500 TABLET, FILM COATED ORAL ONCE
Status: COMPLETED | OUTPATIENT
Start: 2024-10-02 | End: 2024-10-02

## 2024-10-02 RX ORDER — PREDNISONE 20 MG/1
60 TABLET ORAL DAILY
Qty: 15 TABLET | Refills: 0 | Status: SHIPPED | OUTPATIENT
Start: 2024-10-02

## 2024-10-02 RX ORDER — AZITHROMYCIN 250 MG/1
250 TABLET, FILM COATED ORAL EVERY 24 HOURS
Qty: 4 TABLET | Refills: 0 | Status: SHIPPED | OUTPATIENT
Start: 2024-10-02 | End: 2024-10-06

## 2024-10-02 RX ORDER — PREDNISONE 20 MG/1
60 TABLET ORAL ONCE
Status: COMPLETED | OUTPATIENT
Start: 2024-10-02 | End: 2024-10-02

## 2024-10-02 RX ADMIN — AZITHROMYCIN DIHYDRATE 500 MG: 250 TABLET ORAL at 08:42

## 2024-10-02 RX ADMIN — ALBUTEROL SULFATE 2 PUFF: 90 AEROSOL, METERED RESPIRATORY (INHALATION) at 08:42

## 2024-10-02 RX ADMIN — ALBUTEROL SULFATE 5 MG: 2.5 SOLUTION RESPIRATORY (INHALATION) at 07:21

## 2024-10-02 RX ADMIN — IPRATROPIUM BROMIDE 0.5 MG: 0.5 SOLUTION RESPIRATORY (INHALATION) at 07:21

## 2024-10-02 RX ADMIN — PREDNISONE 60 MG: 20 TABLET ORAL at 07:21

## 2024-10-02 NOTE — DISCHARGE INSTRUCTIONS
Take the azithromycin daily for the next 4 days, make sure to finish off the entire course.    Take the Prednisone, 3 pills daily for the next 5 days.    Use the albuterol as needed for shortness of breath and wheezing.    Return to the ER if your breathing worsens despite taking the medications.

## 2024-10-02 NOTE — ED PROVIDER NOTES
Final diagnoses:   Cough   COPD (chronic obstructive pulmonary disease) ([facility])     ED Disposition       ED Disposition   Discharge    Condition   Stable    Date/Time   Wed Oct 2, 2024  8:21 AM    Comment   Abdi Owen discharge to home/self care.                   Assessment & Plan       Medical Decision Making  1. Cough - Patient states this is similar to previous URI's. He has some chest discomfort which he states is typical, he declines cardiac workup when offered as he feels this is no different from his previous upper respiratory infection. Will check CXR to assess possible pneumonia, order COVID/Flu, will give breathing treatment and steroids.    Problems Addressed:  COPD (chronic obstructive pulmonary disease) ([facility]): chronic illness or injury  Cough: acute illness or injury    Amount and/or Complexity of Data Reviewed  Labs: ordered.  Radiology: ordered and independent interpretation performed.    Risk  Prescription drug management.             Medications   albuterol inhalation solution 5 mg (5 mg Nebulization Given 10/2/24 0721)   ipratropium (ATROVENT) 0.02 % inhalation solution 0.5 mg (0.5 mg Nebulization Given 10/2/24 0721)   predniSONE tablet 60 mg (60 mg Oral Given 10/2/24 0721)   azithromycin (ZITHROMAX) tablet 500 mg (500 mg Oral Given 10/2/24 0842)   albuterol (PROVENTIL HFA,VENTOLIN HFA) inhaler 2 puff (2 puffs Inhalation Given 10/2/24 0842)       ED Risk Strat Scores                                               History of Present Illness       Chief Complaint   Patient presents with    Flu Symptoms     Pt reports chest congestion for the past 2-3 days. Today some green sputum.        Past Medical History:   Diagnosis Date    Asthma     Bladder injury     Bronchitis     COPD (chronic obstructive pulmonary disease) ([facility])     COVID 10/2021    Hepatitis C     Infectious viral hepatitis     Liver disease     Pneumonia     Tinnitus       Past Surgical History:   Procedure Laterality Date     COLONOSCOPY W/ POLYPECTOMY  11/22/2022    6 MONTHS = MUST    COLOSTOMY N/A 09/26/2022    Procedure: COLOSTOMY END;  Surgeon: Adriana Thacker MD;  Location: [facility];  Service: General    COLOSTOMY N/A 09/26/2022    Procedure: COLOSTOMY LOOP;  Surgeon: Adriana Thacker MD;  [facility];  Service: General    FL CYSTOGRAM  4/5/2023    FL CYSTOGRAM  4/14/2023    INCISIONAL HERNIA REPAIR N/A 3/22/2023    Procedure: REPAIR COMPLEX INCISIONAL HERNIA  LAPAROSCOPIC WITH MESH, ROBOTIC ASSISTED, AND BLADDER PERFERATION REPAIR;  Surgeon: Adriana Thacker MD;  [facility];  Service: General    IR DRAINAGE TUBE CHECK/CHANGE/REPOSITION/REINSERTION/UPSIZE  4/28/2023    IR DRAINAGE TUBE PLACEMENT  4/13/2023    KNEE ARTHROSCOPY Left     LAPAROTOMY N/A 09/26/2022    Procedure: LAPAROTOMY EXPLORATORY, LYSIS OF ADHESIONS, LOW ANTERIOR RESECTION WITH PRIMARY ANASTOMOSIS;  Surgeon: Adriana Thacker MD;  [facility];  Service: General    MOUTH SURGERY      MT CLOSURE ENTEROSTOMY LG/SMALL INTESTINE N/A 11/23/2022    Procedure: CLOSURE COLOSTOMY, LYSIS OF ADEHSIONS, RIGID SIGMOIDOSCOPY;  Surgeon: Adriana Thacker MD;  [facility];  Service: General    MT COLECTOMY PARTIAL W/ANASTOMOSIS N/A 09/26/2022    Procedure: RESECTION COLON SIGMOID;  Surgeon: Adriana Thacker MD;  [facility];  Service: General    MT PROCTOSGMDSC RGD DX W/WO COLLJ SPEC BR/WA SPX N/A 09/26/2022    Procedure: SIGMOIDOSCOPY RIGID;  Surgeon: Adriana Thacker MD;  [facility];  Service: General      Family History   Problem Relation Age of Onset    Diabetes Father     Heart disease Mother       Social History     Tobacco Use    Smoking status: Every Day     Current packs/day: 0.25     Types: Cigarettes    Smokeless tobacco: Never    Tobacco comments:     Last smoked today 3.2.23   Vaping Use    Vaping status: Never Used   Substance Use Topics    Alcohol use: Not Currently    Drug use: Not Currently     Types:  Marijuana, Heroin, Methamphetamines, Fentanyl     Comment: nothing since 12/20/2020      E-Cigarette/Vaping    E-Cigarette Use Never User       E-Cigarette/Vaping Substances    Nicotine No     THC No     CBD No     Flavoring No     Other No     Unknown No       I have reviewed and agree with the history as documented.     58 YO male presents with coughing and congestion. States this has been present for the last 3 days, mostly constant, states he will have coughing fits, bringing up greenish sputum. He has had a mildly elevated temperature. Patient has a history of COPD, states he has used all his albuterol at home. He has had similar in the past, states typical symptoms when he has a URI. He notes some chest discomfort but states this is typical when he gets sick, which he states is often. Pt denies F/C/N/V/D/C, no dysuria, burning on urination or blood in urine.       History provided by:  Patient   used: No    Flu Symptoms  Presenting symptoms: cough and shortness of breath    Presenting symptoms: no fever, no nausea and no vomiting    Associated symptoms: no neck stiffness        Review of Systems   Constitutional:  Negative for fever.   HENT:  Negative for dental problem.    Eyes:  Negative for visual disturbance.   Respiratory:  Positive for cough, chest tightness and shortness of breath.    Cardiovascular:  Negative for chest pain.   Gastrointestinal:  Negative for abdominal pain, nausea and vomiting.   Genitourinary:  Negative for dysuria and frequency.   Musculoskeletal:  Negative for neck pain and neck stiffness.   Skin:  Negative for rash.   Neurological:  Negative for dizziness, weakness and light-headedness.   Psychiatric/Behavioral:  Negative for agitation, behavioral problems and confusion.    All other systems reviewed and are negative.          Objective       ED Triage Vitals [10/02/24 0705]   Temperature Pulse Blood Pressure Respirations SpO2 Patient Position - Orthostatic VS    99.2 °F (37.3 °C) (!) 120 156/88 20 94 % Sitting      Temp Source Heart Rate Source BP Location FiO2 (%) Pain Score    Oral Monitor Right arm -- 2      Vitals      Date and Time Temp Pulse SpO2 Resp BP Pain Score FACES Pain Rating User   10/02/24 0705 99.2 °F (37.3 °C) 120 94 % 20 156/88 2 -- CO            Physical Exam  Vitals and nursing note reviewed.   Constitutional:       Appearance: Normal appearance. He is well-developed.   HENT:      Head: Normocephalic and atraumatic.   Eyes:      Extraocular Movements: Extraocular movements intact.   Cardiovascular:      Rate and Rhythm: Normal rate and regular rhythm.   Pulmonary:      Effort: Pulmonary effort is normal. No respiratory distress.      Breath sounds: Wheezing (Mild wheezing diffusely) present.   Abdominal:      General: There is no distension.   Musculoskeletal:         General: Normal range of motion.      Cervical back: Normal range of motion.   Skin:     Findings: No rash.   Neurological:      Mental Status: He is alert and oriented to person, place, and time.   Psychiatric:         Behavior: Behavior normal.         Results Reviewed       Procedure Component Value Units Date/Time    FLU/COVID Rapid Antigen (30 min. TAT) - Preferred screening test in ED [[id number]]  (Normal) Collected: 10/02/24 0723    Lab Status: Final result Specimen: Nares from Nose Updated: 10/02/24 0819     SARS COV Rapid Antigen Negative     Influenza A Rapid Antigen Negative     Influenza B Rapid Antigen Negative    Narrative:      This test has been performed using the Quidel Lisa 2 FLU+SARS Antigen test under the Emergency Use Authorization (EUA). This test has been validated by the  and verified by the performing laboratory. The Lisa uses lateral flow immunofluorescent sandwich assay to detect SARS-COV, Influenza A and Influenza B Antigen.     The Quidel Lisa 2 SARS Antigen test does not differentiate between SARS-CoV and SARS-CoV-2.     Negative results are  presumptive and may be confirmed with a molecular assay, if necessary, for patient management. Negative results do not rule out SARS-CoV-2 or influenza infection and should not be used as the sole basis for treatment or patient management decisions. A negative test result may occur if the level of antigen in a sample is below the limit of detection of this test.     Positive results are indicative of the presence of viral antigens, but do not rule out bacterial infection or co-infection with other viruses.     All test results should be used as an adjunct to clinical observations and other information available to the provider.    FOR PEDIATRIC PATIENTS - copy/paste COVID Guidelines URL to browser: [email or web address]            XR chest 2 views   Final Interpretation by Jeff Valencia MD (10/02 0813)      No acute cardiopulmonary disease.            Workstation performed: [id number]             Procedures    ED Medication and Procedure Management   Prior to Admission Medications   Prescriptions Last Dose Informant Patient Reported? Taking?   BD PosiFlush 0.9 % SOLN  Self Yes No   Patient not taking: Reported on 5/15/2023   SODIUM CHLORIDE, EXTERNAL, (Saline Wound Wash) 0.9 % SOLN  Self No No   Sig: 10 mL by Intracatheter route daily for 90 doses Intracatheter flushing daily. May substitute prefilled syringe with normal saline 10 mL vials, 10 mL syringes, and 18 g blunt needles,   Patient not taking: Reported on 4/25/2024   methocarbamol (ROBAXIN) 750 mg tablet   No No   Sig: Take 1 tablet (750 mg total) by mouth every 6 (six) hours for 7 days   Patient not taking: Reported on 3/25/2023   naproxen (NAPROSYN) 500 mg tablet  Self Yes No   Sig: Take 500 mg by mouth   Patient not taking: Reported on 4/25/2024   sodium chloride, PF, 0.9 %  Self No No   Sig: 10 mL by Intracatheter route daily for 90 doses Intracatheter flushing daily. May substitute prefilled  syringe with normal saline 10 mL vials, 10 mL syringes, and 18 g blunt needles   Patient not taking: Reported on 5/15/2023      Facility-Administered Medications: None     Discharge Medication List as of 10/2/2024  8:22 AM        START taking these medications    Details   azithromycin (ZITHROMAX) 250 mg tablet Take 1 tablet (250 mg total) by mouth every 24 hours for 4 days, Starting Wed 10/2/2024, Until Sun 10/6/2024, Normal      predniSONE 20 mg tablet Take 3 tablets (60 mg total) by mouth daily, Starting Wed 10/2/2024, Normal           CONTINUE these medications which have NOT CHANGED    Details   BD PosiFlush 0.9 % SOLN Starting Mon 4/24/2023, Historical Med      methocarbamol (ROBAXIN) 750 mg tablet Take 1 tablet (750 mg total) by mouth every 6 (six) hours for 7 days, Starting Thu 3/23/2023, Until Thu 3/30/2023, Normal      naproxen (NAPROSYN) 500 mg tablet Take 500 mg by mouth, Historical Med      SODIUM CHLORIDE, EXTERNAL, (Saline Wound Wash) 0.9 % SOLN 10 mL by Intracatheter route daily for 90 doses Intracatheter flushing daily. May substitute prefilled syringe with normal saline 10 mL vials, 10 mL syringes, and 18 g blunt needles,, Normal      sodium chloride, PF, 0.9 % 10 mL by Intracatheter route daily for 90 doses Intracatheter flushing daily. May substitute prefilled syringe with normal saline 10 mL vials, 10 mL syringes, and 18 g blunt needles, Starting Thu 4/20/2023, Until Wed 7/19/2023, Normal           No discharge procedures on file.  ED SEPSIS DOCUMENTATION   Time reflects when diagnosis was documented in both MDM as applicable and the Disposition within this note       Time User Action Codes Description Comment    10/2/2024  8:21 AM Salvador Plaza [R05.9] Cough     10/2/2024  8:21 AM Salvador Plaza [J44.9] COPD (chronic obstructive pulmonary disease) ([facility])                  Salvador Plaza MD  10/02/24 0859

## 2025-07-07 NOTE — ANESTHESIA PREPROCEDURE EVALUATION
Procedure:  COLONOSCOPY    Relevant Problems   ANESTHESIA (within normal limits)      GI/HEPATIC   (+) Hepatitis C      NEURO/PSYCH  polysubstance abuse      PULMONARY   (+) Asthma   (+) COPD (chronic obstructive pulmonary disease) ([facility])      Other   (+) Colostomy present ([facility])        Physical Exam    Airway    Mallampati score: II  TM Distance: >3 FB  Neck ROM: full     Dental   No notable dental hx     Cardiovascular  Rhythm: regular, Rate: normal, Cardiovascular exam normal    Pulmonary  Pulmonary exam normal Breath sounds clear to auscultation,     Other Findings        Anesthesia Plan  ASA Score- 3     Anesthesia Type- IV sedation with anesthesia with ASA Monitors  Additional Monitors:   Airway Plan:     Comment: GA prn  Plan Factors-    Chart reviewed  EKG reviewed  Existing labs reviewed  Patient summary reviewed  Patient is a current smoker  Patient instructed to abstain from smoking on day of procedure  Patient smoked on day of surgery  Obstructive sleep apnea risk education given perioperatively  Induction- intravenous  Postoperative Plan-     Informed Consent- Anesthetic plan and risks discussed with patient  yes

## (undated) DEVICE — SCD SEQUENTIAL COMPRESSION COMFORT SLEEVE MEDIUM KNEE LENGTH: Brand: KENDALL SCD

## (undated) DEVICE — DRAPE LAPAROTOMY W/POUCHES

## (undated) DEVICE — 3000CC GUARDIAN II: Brand: GUARDIAN

## (undated) DEVICE — COLUMN DRAPE

## (undated) DEVICE — SUT VICRYL 0 CT-1 36 IN J946H

## (undated) DEVICE — ENSEAL 20 CM SHAFT, LARGE JAW: Brand: ENSEAL X1

## (undated) DEVICE — SUT SILK 3-0 SH CR/8 18 IN C013D

## (undated) DEVICE — MAYO STAND COVER: Brand: CONVERTORS

## (undated) DEVICE — INTENDED FOR TISSUE SEPARATION, AND OTHER PROCEDURES THAT REQUIRE A SHARP SURGICAL BLADE TO PUNCTURE OR CUT.: Brand: BARD-PARKER SAFETY BLADES SIZE 15, STERILE

## (undated) DEVICE — SUT VICRYL 1 CT-1 27 IN J261H

## (undated) DEVICE — SUT ETHILON 3-0 PS-1 18 IN 1663G

## (undated) DEVICE — MONOPOLAR CURVED SCISSORS: Brand: ENDOWRIST

## (undated) DEVICE — TROCAR: Brand: KII FIOS FIRST ENTRY

## (undated) DEVICE — WET SKIN PREP TRAY: Brand: MEDLINE INDUSTRIES, INC.

## (undated) DEVICE — ALLENTOWN LAP CHOLE APP PACK: Brand: CARDINAL HEALTH

## (undated) DEVICE — SUT STRATAFIX SPIRAL PDS PLUS 0 CT-2 30CM SXPP1B405

## (undated) DEVICE — 2963 MEDIPORE SOFT CLOTH TAPE 3 IN X 10 YD 12 RLS/CS: Brand: 3M™ MEDIPORE™

## (undated) DEVICE — GLOVE INDICATOR PI UNDERGLOVE SZ 6.5 BLUE

## (undated) DEVICE — SIGMOIDOSCOPIC SUCTION INSTRUMENT 18 FR W/WINGED CAP CONTROL AND 6 FOOT (1.8M) TUBING: Brand: SIGMOIDOSCOPIC

## (undated) DEVICE — SEAL

## (undated) DEVICE — PROXIMATE RELOADABLE LINEAR CUTTER WITH SAFETY LOCK-OUT, 75MM: Brand: PROXIMATE

## (undated) DEVICE — SUT PROLENE 2-0 SH 36 IN 8523H

## (undated) DEVICE — ADHESIVE SKIN HIGH VISCOSITY EXOFIN 1ML

## (undated) DEVICE — STRL COTTON TIP APPLCTR 6IN PK: Brand: CARDINAL HEALTH

## (undated) DEVICE — SPONGE LAP 18 X 18 IN STRL RFD

## (undated) DEVICE — POV-IOD SOLUTION 4OZ BT

## (undated) DEVICE — OSTO POUCH W/POST OP KIT 22 X 33MM FLANGE 57MM

## (undated) DEVICE — BETHLEHEM UNIVERSAL LAPAROTOMY: Brand: CARDINAL HEALTH

## (undated) DEVICE — REDUCER: Brand: ENDOWRIST

## (undated) DEVICE — BINDER ABDOMINAL 30-45 IN

## (undated) DEVICE — SUT SILK 3-0 SH 30 IN K832H

## (undated) DEVICE — SUT PDS II 1 CT-1 27 IN Z347H

## (undated) DEVICE — TUBING SMOKE EVAC W/FILTRATION DEVICE PLUMEPORT ACTIV

## (undated) DEVICE — LAPAROSCOPIC SMOKE EVAC TUBING

## (undated) DEVICE — INVIEW CLEAR LEGGINGS: Brand: CONVERTORS

## (undated) DEVICE — JACKSON-PRATT 100CC BULB RESERVOIR: Brand: CARDINAL HEALTH

## (undated) DEVICE — GLOVE SRG BIOGEL 6.5

## (undated) DEVICE — ECHELON CIRCULAR POWERED STAPLER: Brand: ECHELON CIRCULAR

## (undated) DEVICE — ASTOUND STANDARD SURGICAL GOWN, XL: Brand: CONVERTORS

## (undated) DEVICE — UNDER BUTTOCKS DRAPE: Brand: CONVERTORS

## (undated) DEVICE — SUT VICRYL 2-0 CT-1 36 IN J945H

## (undated) DEVICE — PROGRASP FORCEPS: Brand: ENDOWRIST

## (undated) DEVICE — SUT PDS II 3-0 SH 27 IN Z316H

## (undated) DEVICE — SUT ETHILON 2-0 FS 18 IN 664H

## (undated) DEVICE — VIOLET BRAIDED (POLYGLACTIN 910), SYNTHETIC ABSORBABLE SUTURE: Brand: COATED VICRYL

## (undated) DEVICE — TRAY FOLEY 16FR URIMETER SURESTEP

## (undated) DEVICE — BLUE HEAT SCOPE WARMER

## (undated) DEVICE — NEEDLE HYPO 22G X 1-1/2 IN

## (undated) DEVICE — SUT MONOCRYL 4-0 PS-2 27 IN Y426H

## (undated) DEVICE — CANNULA SEAL

## (undated) DEVICE — ABSORBABLE WOUND CLOSURE DEVICE: Brand: V-LOC 90

## (undated) DEVICE — 3M™ STERI-STRIP™ REINFORCED ADHESIVE SKIN CLOSURES, R1547, 1/2 IN X 4 IN (12 MM X 100 MM), 6 STRIPS/ENVELOPE: Brand: 3M™ STERI-STRIP™

## (undated) DEVICE — DRAPE EQUIPMENT RF WAND

## (undated) DEVICE — SUT STRATAFIX SMTR PDS PLUS 1 CT-1 30CM SXPP1A435

## (undated) DEVICE — POOLE SUCTION HANDLE: Brand: CARDINAL HEALTH

## (undated) DEVICE — 10FR FRAZIER SUCTION HANDLE: Brand: CARDINAL HEALTH

## (undated) DEVICE — DRAPE FLUID WARMER (BIRD BATH)

## (undated) DEVICE — PLUMEPEN PRO 10FT

## (undated) DEVICE — TIP COVER ACCESSORY

## (undated) DEVICE — SUT VICRYL 3-0 SH 27 IN J416H

## (undated) DEVICE — TELFA ADHESIVE ISLAND DRESSING: Brand: TELFA

## (undated) DEVICE — TELFA NON-ADHERENT ABSORBENT DRESSING: Brand: TELFA

## (undated) DEVICE — SUT SILK 2-0 30 IN A305H

## (undated) DEVICE — CHLORAPREP HI-LITE 26ML ORANGE

## (undated) DEVICE — INTENDED FOR TISSUE SEPARATION, AND OTHER PROCEDURES THAT REQUIRE A SHARP SURGICAL BLADE TO PUNCTURE OR CUT.: Brand: BARD-PARKER SAFETY BLADES SIZE 10, STERILE

## (undated) DEVICE — JP PERF DRN SIL FLT 10MM FULL: Brand: CARDINAL HEALTH

## (undated) DEVICE — JP CHAN DRN SIL HUBLESS 15FR W/TRO: Brand: CARDINAL HEALTH

## (undated) DEVICE — ARM DRAPE

## (undated) DEVICE — SIGMOIDOSCOPE DISPOSABLE PIECE

## (undated) DEVICE — TUBING SUCTION 5MM X 12 FT

## (undated) DEVICE — PROXIMATE RELOADABLE LINEAR STAPLER: Brand: PROXIMATE

## (undated) DEVICE — Device: Brand: DISPOSABLE BULB & BLADDER

## (undated) DEVICE — SUT PDS II 0 CT-1 36 IN Z346H

## (undated) DEVICE — SMOKE EVACUATION TUBING WITH 7/8 IN TO 1/4 IN REDUCER: Brand: BUFFALO FILTER

## (undated) DEVICE — BLADELESS OBTURATOR: Brand: WECK VISTA

## (undated) DEVICE — SUT PROLENE 2-0 V7 36 IN 8977H

## (undated) DEVICE — DISPOSABLE OR TOWEL: Brand: CARDINAL HEALTH

## (undated) DEVICE — NEEDLE COUNTER LG W/RULER